# Patient Record
Sex: FEMALE | Race: WHITE | NOT HISPANIC OR LATINO | Employment: OTHER | URBAN - METROPOLITAN AREA
[De-identification: names, ages, dates, MRNs, and addresses within clinical notes are randomized per-mention and may not be internally consistent; named-entity substitution may affect disease eponyms.]

---

## 2023-03-15 ENCOUNTER — TELEPHONE (OUTPATIENT)
Dept: PRIMARY CARE | Facility: CLINIC | Age: 58
End: 2023-03-15
Payer: COMMERCIAL

## 2023-03-15 NOTE — TELEPHONE ENCOUNTER
"Pt is wanting to change meds around: start Prozac instead of Wellbutrin as anxiety is \"off the charts\". Pt also wants to switch to hydroxyzine HCI capsules NOT the tablets.   "

## 2023-04-05 ENCOUNTER — TELEPHONE (OUTPATIENT)
Dept: PRIMARY CARE | Facility: CLINIC | Age: 58
End: 2023-04-05
Payer: COMMERCIAL

## 2023-04-05 DIAGNOSIS — E66.9 DIABETES MELLITUS TYPE 2 IN OBESE: Primary | ICD-10-CM

## 2023-04-05 DIAGNOSIS — E11.69 DIABETES MELLITUS TYPE 2 IN OBESE: Primary | ICD-10-CM

## 2023-04-05 RX ORDER — INSULIN GLARGINE 100 [IU]/ML
15 INJECTION, SOLUTION SUBCUTANEOUS NIGHTLY
Qty: 15 ML | Refills: 2 | Status: SHIPPED | OUTPATIENT
Start: 2023-04-05 | End: 2023-04-07 | Stop reason: SDUPTHER

## 2023-04-05 NOTE — TELEPHONE ENCOUNTER
Pt called in requesting a refill on her Basaglar     Pt's pharmacy is [facility] in [place] on [facility]

## 2023-04-07 DIAGNOSIS — E11.9 TYPE 2 DIABETES MELLITUS WITHOUT COMPLICATION, WITH LONG-TERM CURRENT USE OF INSULIN (MULTI): Primary | ICD-10-CM

## 2023-04-07 DIAGNOSIS — Z79.4 TYPE 2 DIABETES MELLITUS WITHOUT COMPLICATION, WITH LONG-TERM CURRENT USE OF INSULIN (MULTI): Primary | ICD-10-CM

## 2023-04-07 RX ORDER — INSULIN GLARGINE-YFGN 100 [IU]/ML
15 INJECTION, SOLUTION SUBCUTANEOUS DAILY
Qty: 15 ML | Refills: 3 | Status: SHIPPED | OUTPATIENT
Start: 2023-04-07 | End: 2023-12-05 | Stop reason: SDUPTHER

## 2023-04-28 PROBLEM — I10 ESSENTIAL HYPERTENSION: Status: ACTIVE | Noted: 2023-04-28

## 2023-04-28 PROBLEM — Z57.9 OCCUPATIONAL ASTHMA (HHS-HCC): Status: ACTIVE | Noted: 2023-04-28

## 2023-04-28 PROBLEM — J35.8 TONSIL STONE: Status: ACTIVE | Noted: 2023-04-28

## 2023-04-28 PROBLEM — E03.9 HYPOTHYROIDISM: Status: ACTIVE | Noted: 2023-04-28

## 2023-04-28 PROBLEM — F32.A ANXIETY AND DEPRESSION: Status: ACTIVE | Noted: 2023-04-28

## 2023-04-28 PROBLEM — E55.9 VITAMIN D INSUFFICIENCY: Status: ACTIVE | Noted: 2023-04-28

## 2023-04-28 PROBLEM — J45.909 OCCUPATIONAL ASTHMA (HHS-HCC): Status: ACTIVE | Noted: 2023-04-28

## 2023-04-28 PROBLEM — R07.89 CHEST HEAVINESS: Status: ACTIVE | Noted: 2023-04-28

## 2023-04-28 PROBLEM — L27.0 ERUPTION DUE TO DRUG: Status: ACTIVE | Noted: 2023-04-28

## 2023-04-28 PROBLEM — G43.909 MIGRAINES: Status: ACTIVE | Noted: 2023-04-28

## 2023-04-28 PROBLEM — E88.810 METABOLIC SYNDROME X: Status: ACTIVE | Noted: 2023-04-28

## 2023-04-28 PROBLEM — H10.30 CONJUNCTIVITIS, ACUTE: Status: ACTIVE | Noted: 2023-04-28

## 2023-04-28 PROBLEM — E66.9 DIABETES MELLITUS TYPE 2 IN OBESE: Status: ACTIVE | Noted: 2023-04-28

## 2023-04-28 PROBLEM — S80.11XA HEMATOMA OF RIGHT LOWER EXTREMITY: Status: ACTIVE | Noted: 2023-04-28

## 2023-04-28 PROBLEM — R51.9 CHRONIC HEADACHE: Status: ACTIVE | Noted: 2023-04-28

## 2023-04-28 PROBLEM — E11.69 DIABETES MELLITUS TYPE 2 IN OBESE: Status: ACTIVE | Noted: 2023-04-28

## 2023-04-28 PROBLEM — B34.9 ACUTE VIRAL SYNDROME: Status: ACTIVE | Noted: 2023-04-28

## 2023-04-28 PROBLEM — N63.10 BREAST MASS, RIGHT: Status: ACTIVE | Noted: 2023-04-28

## 2023-04-28 PROBLEM — J03.90 TONSILLITIS: Status: ACTIVE | Noted: 2023-04-28

## 2023-04-28 PROBLEM — J47.9 BRONCHIECTASIS (MULTI): Status: ACTIVE | Noted: 2023-04-28

## 2023-04-28 PROBLEM — K52.9 GASTROENTERITIS, ACUTE: Status: ACTIVE | Noted: 2023-04-28

## 2023-04-28 PROBLEM — R00.2 PALPITATIONS: Status: ACTIVE | Noted: 2023-04-28

## 2023-04-28 PROBLEM — G62.9 NEUROPATHY: Status: ACTIVE | Noted: 2023-04-28

## 2023-04-28 PROBLEM — R10.11 ABDOMINAL PAIN, ACUTE, RIGHT UPPER QUADRANT: Status: ACTIVE | Noted: 2023-04-28

## 2023-04-28 PROBLEM — E66.9 OBESITY WITH BODY MASS INDEX (BMI) OF 30.0 TO 39.9: Status: ACTIVE | Noted: 2023-04-28

## 2023-04-28 PROBLEM — M81.0 POSTMENOPAUSAL BONE LOSS: Status: ACTIVE | Noted: 2023-04-28

## 2023-04-28 PROBLEM — H60.92 EXTERNAL OTITIS OF LEFT EAR: Status: ACTIVE | Noted: 2023-04-28

## 2023-04-28 PROBLEM — E78.2 MIXED HYPERLIPIDEMIA: Status: ACTIVE | Noted: 2023-04-28

## 2023-04-28 PROBLEM — K57.30 DIVERTICULOSIS OF LARGE INTESTINE: Status: ACTIVE | Noted: 2023-04-28

## 2023-04-28 PROBLEM — R07.89 ATYPICAL CHEST PAIN: Status: ACTIVE | Noted: 2023-04-28

## 2023-04-28 PROBLEM — R91.8 MULTIPLE LUNG NODULES ON CT: Status: ACTIVE | Noted: 2023-04-28

## 2023-04-28 PROBLEM — H10.33 ACUTE CONJUNCTIVITIS OF BOTH EYES: Status: ACTIVE | Noted: 2023-04-28

## 2023-04-28 PROBLEM — T78.40XA ALLERGIES: Status: ACTIVE | Noted: 2023-04-28

## 2023-04-28 PROBLEM — G43.809 HEADACHE, VARIANT MIGRAINE: Status: ACTIVE | Noted: 2023-04-28

## 2023-04-28 PROBLEM — R35.0 FREQUENT URINATION: Status: ACTIVE | Noted: 2023-04-28

## 2023-04-28 PROBLEM — G89.29 CHRONIC HEADACHE: Status: ACTIVE | Noted: 2023-04-28

## 2023-04-28 PROBLEM — R11.0 NAUSEA IN ADULT: Status: ACTIVE | Noted: 2023-04-28

## 2023-04-28 PROBLEM — R25.2 CRAMP IN MUSCLE: Status: ACTIVE | Noted: 2023-04-28

## 2023-04-28 PROBLEM — R05.8 PRODUCTIVE COUGH: Status: ACTIVE | Noted: 2023-04-28

## 2023-04-28 PROBLEM — F41.9 ANXIETY AND DEPRESSION: Status: ACTIVE | Noted: 2023-04-28

## 2023-04-28 RX ORDER — DEXLANSOPRAZOLE 60 MG/1
1 CAPSULE, DELAYED RELEASE ORAL DAILY
COMMUNITY
Start: 2023-01-16 | End: 2023-11-07 | Stop reason: ALTCHOICE

## 2023-04-28 RX ORDER — PEN NEEDLE, DIABETIC 32GX 5/32"
NEEDLE, DISPOSABLE MISCELLANEOUS
COMMUNITY
Start: 2023-04-06 | End: 2023-12-08 | Stop reason: SDUPTHER

## 2023-04-28 RX ORDER — OMEPRAZOLE 40 MG/1
40 CAPSULE, DELAYED RELEASE ORAL
COMMUNITY
End: 2024-05-02 | Stop reason: SDUPTHER

## 2023-04-28 RX ORDER — LIRAGLUTIDE 6 MG/ML
INJECTION SUBCUTANEOUS 2 TIMES DAILY
COMMUNITY
Start: 2019-02-05 | End: 2023-06-29 | Stop reason: SDUPTHER

## 2023-04-28 RX ORDER — BECLOMETHASONE DIPROPIONATE HFA 80 UG/1
2 AEROSOL, METERED RESPIRATORY (INHALATION) 2 TIMES DAILY
COMMUNITY

## 2023-04-28 RX ORDER — HYDROCODONE BITARTRATE AND ACETAMINOPHEN 5; 325 MG/1; MG/1
TABLET ORAL
COMMUNITY
End: 2023-07-18 | Stop reason: SDUPTHER

## 2023-04-28 RX ORDER — KETOROLAC TROMETHAMINE 30 MG/ML
INJECTION, SOLUTION INTRAMUSCULAR; INTRAVENOUS
COMMUNITY
Start: 2022-09-01 | End: 2024-05-10 | Stop reason: SDUPTHER

## 2023-04-28 RX ORDER — BACLOFEN 20 MG
500 TABLET ORAL 2 TIMES DAILY
COMMUNITY

## 2023-04-28 RX ORDER — INSULIN ASPART 100 [IU]/ML
INJECTION, SOLUTION INTRAVENOUS; SUBCUTANEOUS
COMMUNITY
End: 2023-12-05 | Stop reason: SDUPTHER

## 2023-04-28 RX ORDER — METHYLPREDNISOLONE 4 MG/1
TABLET ORAL
COMMUNITY
Start: 2023-04-04 | End: 2024-05-02 | Stop reason: WASHOUT

## 2023-04-28 RX ORDER — NEEDLES, FILTER 19GX1 1/2"
NEEDLE, DISPOSABLE MISCELLANEOUS
COMMUNITY
Start: 2023-01-16

## 2023-04-28 RX ORDER — ACETAMINOPHEN 500 MG
1 TABLET ORAL DAILY
COMMUNITY

## 2023-04-28 RX ORDER — SPIRONOLACTONE 25 MG/1
TABLET ORAL DAILY
COMMUNITY
End: 2023-05-10 | Stop reason: SDUPTHER

## 2023-04-28 RX ORDER — BUPROPION HYDROCHLORIDE 150 MG/1
150 TABLET ORAL DAILY
COMMUNITY
End: 2023-12-08 | Stop reason: SDUPTHER

## 2023-04-28 RX ORDER — BUSPIRONE HYDROCHLORIDE 10 MG/1
TABLET ORAL
COMMUNITY
Start: 2022-11-22 | End: 2023-11-07 | Stop reason: ALTCHOICE

## 2023-04-28 RX ORDER — INSULIN GLARGINE 100 [IU]/ML
INJECTION, SOLUTION SUBCUTANEOUS
COMMUNITY
Start: 2022-02-28 | End: 2023-08-08 | Stop reason: SDUPTHER

## 2023-04-28 RX ORDER — HYDROXYZINE HYDROCHLORIDE 25 MG/1
1 TABLET, FILM COATED ORAL
COMMUNITY
Start: 2023-02-13 | End: 2023-05-10 | Stop reason: ALTCHOICE

## 2023-04-28 RX ORDER — SODIUM FLUORIDE1.1%, POTASSIUM NITRATE 5% 5.8; 57.5 MG/ML; MG/ML
GEL, DENTIFRICE DENTAL
COMMUNITY
Start: 2023-01-26

## 2023-04-28 RX ORDER — FLUTICASONE FUROATE AND VILANTEROL 200; 25 UG/1; UG/1
1 POWDER RESPIRATORY (INHALATION) DAILY
COMMUNITY
Start: 2022-12-29

## 2023-04-28 RX ORDER — ATORVASTATIN CALCIUM 40 MG/1
40 TABLET, FILM COATED ORAL DAILY
COMMUNITY
End: 2023-05-10 | Stop reason: SDUPTHER

## 2023-04-28 RX ORDER — LOSARTAN POTASSIUM 100 MG/1
100 TABLET ORAL DAILY
COMMUNITY
End: 2024-02-08 | Stop reason: SDUPTHER

## 2023-04-28 RX ORDER — NALOXONE HYDROCHLORIDE 4 MG/.1ML
SPRAY NASAL
COMMUNITY
Start: 2020-12-09

## 2023-04-28 RX ORDER — DEXAMETHASONE 0.75 MG/1
2 TABLET ORAL
COMMUNITY
Start: 2018-11-12 | End: 2023-08-08 | Stop reason: SDUPTHER

## 2023-04-28 RX ORDER — PROMETHAZINE HYDROCHLORIDE 25 MG/1
25 TABLET ORAL 3 TIMES DAILY PRN
COMMUNITY
End: 2023-12-05 | Stop reason: SDUPTHER

## 2023-04-28 RX ORDER — LEVOTHYROXINE SODIUM 88 UG/1
1 TABLET ORAL DAILY
COMMUNITY
Start: 2023-01-31 | End: 2024-02-08 | Stop reason: SDUPTHER

## 2023-04-28 RX ORDER — ALBUTEROL SULFATE 90 UG/1
AEROSOL, METERED RESPIRATORY (INHALATION)
COMMUNITY
End: 2023-12-15 | Stop reason: DRUGHIGH

## 2023-04-28 RX ORDER — CEPHALEXIN 500 MG/1
1 TABLET ORAL 3 TIMES DAILY
COMMUNITY
Start: 2022-11-22 | End: 2023-05-10 | Stop reason: ALTCHOICE

## 2023-04-28 RX ORDER — ATENOLOL 50 MG/1
50 TABLET ORAL 2 TIMES DAILY
COMMUNITY
End: 2023-12-05 | Stop reason: SDUPTHER

## 2023-04-28 RX ORDER — METFORMIN HYDROCHLORIDE 500 MG/1
1000 TABLET, EXTENDED RELEASE ORAL 2 TIMES DAILY
COMMUNITY
End: 2023-08-08 | Stop reason: SDUPTHER

## 2023-04-28 RX ORDER — FENOFIBRATE 160 MG/1
160 TABLET ORAL DAILY
COMMUNITY
End: 2024-02-08 | Stop reason: SDUPTHER

## 2023-04-28 RX ORDER — DAPAGLIFLOZIN 10 MG/1
10 TABLET, FILM COATED ORAL
COMMUNITY
End: 2023-08-08 | Stop reason: SDUPTHER

## 2023-04-28 RX ORDER — OXYBUTYNIN CHLORIDE 5 MG/1
TABLET ORAL
COMMUNITY
Start: 2022-08-30 | End: 2024-02-08 | Stop reason: WASHOUT

## 2023-04-28 RX ORDER — BLOOD SUGAR DIAGNOSTIC
STRIP MISCELLANEOUS
COMMUNITY
Start: 2018-12-18

## 2023-04-28 RX ORDER — VITAMIN B COMPLEX
1 CAPSULE ORAL 2 TIMES DAILY
COMMUNITY

## 2023-04-28 RX ORDER — ASPIRIN 81 MG/1
TABLET ORAL
COMMUNITY
Start: 2021-09-03

## 2023-04-28 RX ORDER — TOPIRAMATE 100 MG/1
100 TABLET, FILM COATED ORAL 2 TIMES DAILY
COMMUNITY
End: 2023-08-08 | Stop reason: SDUPTHER

## 2023-04-28 RX ORDER — AMLODIPINE BESYLATE 5 MG/1
5 TABLET ORAL DAILY
COMMUNITY
End: 2024-02-08 | Stop reason: SDUPTHER

## 2023-04-28 RX ORDER — ALBUTEROL SULFATE 5 MG/ML
2.5 SOLUTION RESPIRATORY (INHALATION) EVERY 4 HOURS PRN
COMMUNITY
End: 2023-12-05 | Stop reason: SDUPTHER

## 2023-05-10 ENCOUNTER — OFFICE VISIT (OUTPATIENT)
Dept: PRIMARY CARE | Facility: CLINIC | Age: 58
End: 2023-05-10
Payer: COMMERCIAL

## 2023-05-10 VITALS
OXYGEN SATURATION: 98 % | HEIGHT: 64 IN | DIASTOLIC BLOOD PRESSURE: 76 MMHG | HEART RATE: 78 BPM | BODY MASS INDEX: 37.9 KG/M2 | RESPIRATION RATE: 18 BRPM | SYSTOLIC BLOOD PRESSURE: 108 MMHG | TEMPERATURE: 97.1 F | WEIGHT: 222 LBS

## 2023-05-10 DIAGNOSIS — J45.909 ASTHMA, UNSPECIFIED ASTHMA SEVERITY, UNSPECIFIED WHETHER COMPLICATED, UNSPECIFIED WHETHER PERSISTENT (HHS-HCC): ICD-10-CM

## 2023-05-10 DIAGNOSIS — Z86.59 HISTORY OF DEPRESSION: ICD-10-CM

## 2023-05-10 DIAGNOSIS — I10 ESSENTIAL HYPERTENSION: ICD-10-CM

## 2023-05-10 DIAGNOSIS — F41.9 ANXIETY: ICD-10-CM

## 2023-05-10 DIAGNOSIS — E03.9 ACQUIRED HYPOTHYROIDISM: ICD-10-CM

## 2023-05-10 DIAGNOSIS — E66.9 DIABETES MELLITUS TYPE 2 IN OBESE: ICD-10-CM

## 2023-05-10 DIAGNOSIS — E78.2 MIXED HYPERLIPIDEMIA: Primary | ICD-10-CM

## 2023-05-10 DIAGNOSIS — E11.69 DIABETES MELLITUS TYPE 2 IN OBESE: ICD-10-CM

## 2023-05-10 PROBLEM — E11.9 DIABETES MELLITUS (MULTI): Status: ACTIVE | Noted: 2021-12-06

## 2023-05-10 PROBLEM — N60.02 SOLITARY CYST OF LEFT BREAST: Status: ACTIVE | Noted: 2018-05-07

## 2023-05-10 PROBLEM — N60.11 FIBROCYSTIC BREAST CHANGES OF BOTH BREASTS: Status: ACTIVE | Noted: 2017-03-23

## 2023-05-10 PROBLEM — K21.9 GERD (GASTROESOPHAGEAL REFLUX DISEASE): Status: ACTIVE | Noted: 2021-12-06

## 2023-05-10 PROBLEM — G47.30 SLEEP APNEA: Status: ACTIVE | Noted: 2021-12-06

## 2023-05-10 PROBLEM — N64.4 MASTODYNIA OF RIGHT BREAST: Status: ACTIVE | Noted: 2018-04-13

## 2023-05-10 PROBLEM — R06.02 SHORTNESS OF BREATH AT REST: Status: ACTIVE | Noted: 2021-12-06

## 2023-05-10 PROBLEM — R06.09 DOE (DYSPNEA ON EXERTION): Status: ACTIVE | Noted: 2021-12-06

## 2023-05-10 PROBLEM — K76.9 CHRONIC NONALCOHOLIC LIVER DISEASE: Status: ACTIVE | Noted: 2023-01-26

## 2023-05-10 PROBLEM — I25.10 ATHEROSCLEROSIS OF CORONARY ARTERY: Status: ACTIVE | Noted: 2021-12-06

## 2023-05-10 PROBLEM — K57.92 DIVERTICULITIS: Status: ACTIVE | Noted: 2022-01-10

## 2023-05-10 PROBLEM — J44.9 COPD (CHRONIC OBSTRUCTIVE PULMONARY DISEASE) (MULTI): Status: ACTIVE | Noted: 2021-12-06

## 2023-05-10 PROBLEM — R40.0 DAYTIME SLEEPINESS: Status: ACTIVE | Noted: 2021-12-06

## 2023-05-10 PROBLEM — Z86.79 HISTORY OF HYPERTENSION: Status: ACTIVE | Noted: 2023-01-26

## 2023-05-10 PROBLEM — N60.12 FIBROCYSTIC BREAST CHANGES OF BOTH BREASTS: Status: ACTIVE | Noted: 2017-03-23

## 2023-05-10 PROCEDURE — 3078F DIAST BP <80 MM HG: CPT | Performed by: INTERNAL MEDICINE

## 2023-05-10 PROCEDURE — 99214 OFFICE O/P EST MOD 30 MIN: CPT | Performed by: INTERNAL MEDICINE

## 2023-05-10 PROCEDURE — 4010F ACE/ARB THERAPY RXD/TAKEN: CPT | Performed by: INTERNAL MEDICINE

## 2023-05-10 PROCEDURE — 1036F TOBACCO NON-USER: CPT | Performed by: INTERNAL MEDICINE

## 2023-05-10 PROCEDURE — 3074F SYST BP LT 130 MM HG: CPT | Performed by: INTERNAL MEDICINE

## 2023-05-10 RX ORDER — SPIRONOLACTONE 25 MG/1
12.5 TABLET ORAL DAILY
Qty: 45 TABLET | Refills: 1 | Status: SHIPPED | OUTPATIENT
Start: 2023-05-10 | End: 2023-08-08 | Stop reason: SDUPTHER

## 2023-05-10 RX ORDER — HYDROXYZINE PAMOATE 25 MG/1
25 CAPSULE ORAL 3 TIMES DAILY PRN
Qty: 90 CAPSULE | Refills: 3 | Status: SHIPPED | OUTPATIENT
Start: 2023-05-10 | End: 2023-12-05 | Stop reason: SDUPTHER

## 2023-05-10 RX ORDER — ATORVASTATIN CALCIUM 40 MG/1
40 TABLET, FILM COATED ORAL DAILY
Qty: 90 TABLET | Refills: 1 | Status: SHIPPED | OUTPATIENT
Start: 2023-05-10 | End: 2023-08-08 | Stop reason: SDUPTHER

## 2023-05-10 RX ORDER — FLUOXETINE HYDROCHLORIDE 20 MG/1
20 CAPSULE ORAL DAILY
Qty: 30 CAPSULE | Refills: 3 | Status: SHIPPED | OUTPATIENT
Start: 2023-05-10 | End: 2023-08-08 | Stop reason: SDUPTHER

## 2023-05-10 SDOH — ECONOMIC STABILITY: FOOD INSECURITY: WITHIN THE PAST 12 MONTHS, YOU WORRIED THAT YOUR FOOD WOULD RUN OUT BEFORE YOU GOT MONEY TO BUY MORE.: NEVER TRUE

## 2023-05-10 SDOH — ECONOMIC STABILITY: FOOD INSECURITY: WITHIN THE PAST 12 MONTHS, THE FOOD YOU BOUGHT JUST DIDN'T LAST AND YOU DIDN'T HAVE MONEY TO GET MORE.: NEVER TRUE

## 2023-05-10 ASSESSMENT — PATIENT HEALTH QUESTIONNAIRE - PHQ9
2. FEELING DOWN, DEPRESSED OR HOPELESS: NOT AT ALL
1. LITTLE INTEREST OR PLEASURE IN DOING THINGS: NOT AT ALL
SUM OF ALL RESPONSES TO PHQ9 QUESTIONS 1 & 2: 0

## 2023-05-10 ASSESSMENT — LIFESTYLE VARIABLES
HOW OFTEN DO YOU HAVE SIX OR MORE DRINKS ON ONE OCCASION: NEVER
AUDIT-C TOTAL SCORE: 0
HOW OFTEN DO YOU HAVE A DRINK CONTAINING ALCOHOL: NEVER
HOW MANY STANDARD DRINKS CONTAINING ALCOHOL DO YOU HAVE ON A TYPICAL DAY: PATIENT DOES NOT DRINK
SKIP TO QUESTIONS 9-10: 1

## 2023-05-10 NOTE — PROGRESS NOTES
"Chief Complaint/HPI:    stressors at home: patient has had issues with family issues with daughter and son, son is being investigated for \"stalking\". Patient feels \"overloaded and overwhelmed\"    Depression: patient would like to try Vistaril for anxiety, she is depressed, has no energy, she only takes bupropion 150 mg now, patient would like to try fluoxetine, it has helped with eating issue in the past.         FERNÁNDEZ: FERNÁNDEZ is doing OK now. I have personally reviewed the OARRS report. This report is scanned into the electronic medical record. I have considered the risks of abuse, dependence, addiction and diversion. I believe that it is clinically appropriate to prescribe this medication. Patient uses Norco as needed. She also uses Toradol, Phenergan and dexamethasone, she has not used dexamethasone recently,     Obesity: patient went to [facility] bariatrics in [place]. 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     DM type 2: Glucoses have been elevated, Hgb A1C is 7.6, patient now uses Basiglar instead of Lantus due to insurance issues. She does use SSI, Farxiga, Victoza also. Patient feels hungry all the time. Patient wants to consider medical not surgical options. Patient does wonder about about use of Mounjaro or Ozempic. Patient already takes Victoza. She has avoided use of dexamethazone     breast lesion: patient still sees breast specialist at [facility], this is not changed     hypothyroidism : patient takes levothyroxine 88 mcg daily      lab work recently completed     ROS otherwise negative aside from what was " mentioned above in HPI.      Patient Active Problem List   Diagnosis    Abdominal pain, acute, right upper quadrant    Acute conjunctivitis of both eyes    Acute viral syndrome    Allergies    Anxiety and depression    Atypical chest pain    Breast mass, right    Bronchiectasis (CMS/HCC)    Chest heaviness    Chronic headache    Conjunctivitis, acute    Cramp in muscle    Diverticulosis of large intestine    Diabetes mellitus type 2 in obese (CMS/HCC)    Eruption due to drug    Essential hypertension    External otitis of left ear    Frequent urination    Gastroenteritis, acute    Headache, variant migraine    Hematoma of right lower extremity    Hypothyroidism    Metabolic syndrome X    Migraines    Nausea in adult    Neuropathy    Multiple lung nodules on CT    Obesity with body mass index (BMI) of 30.0 to 39.9    Occupational asthma    Mixed hyperlipidemia    Palpitations    Postmenopausal bone loss    Productive cough    Tonsil stone    Tonsillitis    Vitamin D insufficiency    Anxiety    Atherosclerosis of coronary artery    Chronic nonalcoholic liver disease    COPD (chronic obstructive pulmonary disease) (CMS/HCC)    Daytime sleepiness    Diabetes mellitus (CMS/HCC)    Diverticulitis    Fibrocystic breast changes of both breasts    GERD (gastroesophageal reflux disease)    History of depression    History of hypertension    Mastodynia of right breast    PLATA (dyspnea on exertion)    Shortness of breath at rest    Asthma    Sleep apnea    Solitary cyst of left breast         Past Medical History:   Diagnosis Date    Personal history of other diseases of the circulatory system     History of hypertension    Personal history of other diseases of the female genital tract     History of polycystic ovarian syndrome    Personal history of other diseases of the respiratory system     History of asthma    Personal history of other mental and behavioral disorders     History of depression     Past Surgical History:  "  Procedure Laterality Date    OTHER SURGICAL HISTORY  10/25/2019    Hysterectomy    OTHER SURGICAL HISTORY  10/25/2019    Turbinectomy     Social History     Social History Narrative    Not on file         ALLERGIES  Erythromycin, Fentanyl, Icosapent ethyl, Levofloxacin, Metronidazole, Ondansetron hcl, Penicillins, Iodine, and Lisinopril      MEDICATIONS  Current Outpatient Medications on File Prior to Visit   Medication Sig Dispense Refill    albuterol 5 mg/mL nebulizer solution Inhale once daily.      albuterol 90 mcg/actuation inhaler Inhale.      amLODIPine (Norvasc) 5 mg tablet Take 1 tablet (5 mg) by mouth once daily. as directed      aspirin 81 mg EC tablet Take by mouth.      atenolol (Tenormin) 50 mg tablet Take 1 tablet (50 mg) by mouth 2 times a day.      b complex vitamins capsule Take 1 capsule by mouth 2 times a day.      buPROPion XL (Wellbutrin XL) 150 mg 24 hr tablet Take 1 tablet (150 mg) by mouth once daily.      cholecalciferol (Vitamin D-3) 50 mcg (2,000 unit) capsule Take 1 capsule (50 mcg) by mouth once daily.      dexAMETHasone (Decadron) 0.75 mg tablet Take 2 tablets (1.5 mg) by mouth once daily with a meal.      Droplet Pen Needle 32 gauge x 5/32\" needle       Farxiga 10 mg Take 1 tablet (10 mg) by mouth once daily in the morning. Take before meals.      fenofibrate (Triglide) 160 mg tablet Take 1 tablet (160 mg) by mouth once daily.      fluticasone furoate-vilanteroL (Breo Elipta) 200-25 mcg/dose inhaler Inhale 1 puff once daily.      HYDROcodone-acetaminophen (Norco) 5-325 mg tablet take 1 tablet by mouth twice a day if needed for SEVERE HEADACHE      hydrOXYzine HCL (Atarax) 25 mg tablet Take 1 tablet (25 mg) by mouth every 6 hours during the day.      insulin glargine (Lantus) 100 unit/mL (3 mL) pen Inject under the skin once daily.      insulin glargine-yfgn 100 unit/mL (3 mL) insulin pen Inject 15 Units under the skin once daily. Take as directed per insulin instructions. 15 mL 3    " "ketorolac 30 mg/mL (1 mL) injection inject 1 milliliter intramuscularly every 6 hours if needed for migraines      levothyroxine (Synthroid, Levoxyl) 88 mcg tablet Take 1 tablet (88 mcg) by mouth once daily.      losartan (Cozaar) 100 mg tablet Take 1 tablet (100 mg) by mouth once daily.      magnesium oxide 500 mg tablet Take by mouth.      metFORMIN XR (Glucophage-XR) 500 mg 24 hr tablet Take 2 tablets (1,000 mg) by mouth 2 times a day.      naloxone (Narcan) 4 mg/0.1 mL nasal spray Administer into affected nostril(s).      NovoLOG FlexPen U-100 Insulin 100 unit/mL (3 mL) pen inject 5 units subcutaneously BEFORE MEALS IF GLUCOSE IS OVER 200      omeprazole (PriLOSEC) 40 mg DR capsule Take 1 capsule (40 mg) by mouth once daily in the morning. Take before meals.      promethazine (Phenergan) 25 mg tablet Take 1 tablet (25 mg) by mouth 3 times a day as needed.      Qvar RediHaler 80 mcg/actuation inhaler Inhale 2 Inhalations 2 times a day.      sodium fluoride-pot nitrate 1.1-5 % paste BRUSH TWICE A DAY FOR 2 MINUTES AND DO NTO EAT OR DRINK FOR 1 HOUR AFTER BRUSHING      topiramate (Topamax) 100 mg tablet Take 1 tablet (100 mg) by mouth 2 times a day.      Victoza 2-Jordan 0.6 mg/0.1 mL (18 mg/3 mL) injection Inject under the skin twice a day.      [DISCONTINUED] atorvastatin (Lipitor) 40 mg tablet Take 1 tablet (40 mg) by mouth once daily. as directed      [DISCONTINUED] spironolactone (Aldactone) 25 mg tablet Take by mouth once daily.      BD Integra Syringe 3 mL 25 gauge x 1\" syringe use as directed for TORADOL INJECTION intramuscularly if needed      busPIRone (Buspar) 10 mg tablet Take by mouth.      dexlansoprazole (Dexilant) 60 mg DR capsule Take 1 capsule (60 mg) by mouth once daily.      fluticasone/vilanterol (FLUTICASONE FUROATE-VILANTEROL INHL) Inhale once daily.      methylPREDNISolone (Medrol Dospak) 4 mg tablets use as directed FOLLOW DIRECTIONS ON BACK OF FOIL PACK      OneTouch Ultra Test strip OneTouch " "Ultra Blue STRP   Refills: 0        Start : 18-Dec-2018   Active      oxybutynin (Ditropan) 5 mg tablet Take by mouth.      [DISCONTINUED] cephalexin (Keftab) 500 mg tablet Take 1 tablet (500 mg) by mouth 3 times a day.       No current facility-administered medications on file prior to visit.         PHYSICAL EXAM  /76 (BP Location: Left arm, Patient Position: Sitting, BP Cuff Size: Large adult)   Pulse 78   Temp 36.2 °C (97.1 °F)   Resp 18   Ht 1.626 m (5' 4\")   Wt 101 kg (222 lb)   SpO2 98%   BMI 38.11 kg/m²   Body mass index is 38.11 kg/m².  Constitutional   General appearance: Abnormal.  well developed, appears healthy, well nourished, morbidly obese and wearing a mask. pleasant female, NAD. Most recent HgbA1C completed at [facility] was 7.6  Eyes   Inspection of eyes: Sclera and conjunctiva were normal.   Ears, Nose, Mouth, and Throat   Ears: Auricles: Normal.    Pulmonary   Respiratory assessment: No respiratory distress, normal respiratory rhythm and effort.    Auscultation of Lungs: Clear bilateral breath sounds. Auscultation of the lungs:  lungs are clear today. no rales or crackles were heard bilaterally. no rhonchi. no friction rub. no wheezing. no diminished breath sounds. no bronchial breath sounds.   Cardiovascular   Auscultation of heart: Apical pulse normal, heart rate and rhythm normal, normal S1 and S2, no murmurs and no pericardial rub.    Exam for edema: No peripheral edema.   Lymphatic   Palpation of lymph nodes in neck: No cervical lymphadenopathy.   Neurologic   Cranial nerves: Nerves 2-12 were intact, no focal neuro defects. wearing a mask.   Psychiatric   Orientation: Oriented to person, place, and time.    Mood and affect: Normal.      ASSESSMENT/PLAN  Problem List Items Addressed This Visit          Respiratory    Asthma    Current Assessment & Plan     Patient sees allergist and pulmonary med            Circulatory    Essential hypertension    Current Assessment & Plan     BP is " controlled, no med changes now         Relevant Medications    spironolactone (Aldactone) 25 mg tablet       Endocrine/Metabolic    Diabetes mellitus type 2 in obese (CMS/HCC)    Current Assessment & Plan     No med changes for now, patient is aware of dietary issues, continue current meds including high dose Victoza which should have an equivalent response to Ozempic, will deal with depression and anxiety issue first         Hypothyroidism    Current Assessment & Plan     TSH is stable            Other    Mixed hyperlipidemia - Primary    Current Assessment & Plan     Stable at present, no changes for now         Relevant Medications    atorvastatin (Lipitor) 40 mg tablet    History of depression    Current Assessment & Plan     Depression is current issue, will start fluoxetine 20 mg daily, continue bupropion 150 mg daily, Vistaril as needed for anxiety issues, follow up in 6-8 weeks for re evaluation           Relevant Medications    FLUoxetine (PROzac) 20 mg capsule         Kimani Li MD

## 2023-05-10 NOTE — ASSESSMENT & PLAN NOTE
No med changes for now, patient is aware of dietary issues, continue current meds including high dose Victoza which should have an equivalent response to Ozempic, will deal with depression and anxiety issue first

## 2023-05-10 NOTE — ASSESSMENT & PLAN NOTE
Depression is current issue, will start fluoxetine 20 mg daily, continue bupropion 150 mg daily, Vistaril as needed for anxiety issues, follow up in 6-8 weeks for re evaluation

## 2023-06-27 ENCOUNTER — TELEPHONE (OUTPATIENT)
Dept: PRIMARY CARE | Facility: CLINIC | Age: 58
End: 2023-06-27
Payer: COMMERCIAL

## 2023-06-27 NOTE — TELEPHONE ENCOUNTER
Patient is calling for a refill of    Victoza 2-Jordan 0.6 mg/0.1 mL (18 mg/3 mL) injection    Pharmacy is [facility] in [facility].

## 2023-06-29 DIAGNOSIS — E66.9 DIABETES MELLITUS TYPE 2 IN OBESE: Primary | ICD-10-CM

## 2023-06-29 DIAGNOSIS — E11.69 DIABETES MELLITUS TYPE 2 IN OBESE: ICD-10-CM

## 2023-06-29 DIAGNOSIS — E66.9 DIABETES MELLITUS TYPE 2 IN OBESE: ICD-10-CM

## 2023-06-29 DIAGNOSIS — E11.69 DIABETES MELLITUS TYPE 2 IN OBESE: Primary | ICD-10-CM

## 2023-06-29 RX ORDER — TIOTROPIUM BROMIDE INHALATION SPRAY 3.12 UG/1
2 SPRAY, METERED RESPIRATORY (INHALATION)
COMMUNITY
Start: 2022-04-11

## 2023-06-29 RX ORDER — LORAZEPAM 0.5 MG/1
0.5 TABLET ORAL EVERY 12 HOURS
COMMUNITY
Start: 2019-03-23 | End: 2023-08-08 | Stop reason: ALTCHOICE

## 2023-06-29 RX ORDER — LIRAGLUTIDE 6 MG/ML
0.6 INJECTION SUBCUTANEOUS DAILY
Qty: 0.06 G | Refills: 3 | Status: SHIPPED | OUTPATIENT
Start: 2023-06-29 | End: 2023-06-30

## 2023-06-30 PROBLEM — S80.10XA HEMATOMA OF LOWER EXTREMITY: Status: ACTIVE | Noted: 2023-01-26

## 2023-06-30 PROBLEM — E55.9 VITAMIN D DEFICIENCY: Status: ACTIVE | Noted: 2023-01-26

## 2023-06-30 NOTE — TELEPHONE ENCOUNTER
I called the pharmacy to give then a verbal order (Per Dr. SHAH request) to change the Victoza Rx to 1.2 mg BID. The patient was notified.

## 2023-07-11 ENCOUNTER — TELEPHONE (OUTPATIENT)
Dept: PRIMARY CARE | Facility: CLINIC | Age: 58
End: 2023-07-11
Payer: COMMERCIAL

## 2023-07-11 DIAGNOSIS — E11.69 DIABETES MELLITUS TYPE 2 IN OBESE: ICD-10-CM

## 2023-07-11 DIAGNOSIS — E66.9 DIABETES MELLITUS TYPE 2 IN OBESE: ICD-10-CM

## 2023-07-11 RX ORDER — LIRAGLUTIDE 6 MG/ML
1.2 INJECTION SUBCUTANEOUS 2 TIMES DAILY
Qty: 27 ML | Refills: 3 | Status: SHIPPED | OUTPATIENT
Start: 2023-07-11 | End: 2023-12-05 | Stop reason: SDUPTHER

## 2023-07-11 NOTE — TELEPHONE ENCOUNTER
Patient is calling for a correction with her victoza script.  It should 1.2 ml twice a day  the script should be for 27 ml which is 9 pens.  This is the most her insurance will pay for     Pharmacy is [facility] in [place]

## 2023-07-17 ENCOUNTER — TELEPHONE (OUTPATIENT)
Dept: PRIMARY CARE | Facility: CLINIC | Age: 58
End: 2023-07-17
Payer: COMMERCIAL

## 2023-07-17 DIAGNOSIS — G43.809 HEADACHE, VARIANT MIGRAINE: Primary | ICD-10-CM

## 2023-07-17 NOTE — TELEPHONE ENCOUNTER
Pt called and stated that her meds are not going to last her until her appt in August. She was supposed to be seen in June, but we didn't have anything available until August. Can you please fill it for at least until she is seen in August?    HYDROcodone-acetaminophen (Norco) 5-325 mg tablet    Please advise.

## 2023-07-18 RX ORDER — HYDROCODONE BITARTRATE AND ACETAMINOPHEN 5; 325 MG/1; MG/1
1 TABLET ORAL 2 TIMES DAILY PRN
Qty: 20 TABLET | Refills: 0 | Status: SHIPPED | OUTPATIENT
Start: 2023-07-18 | End: 2023-08-08 | Stop reason: SDUPTHER

## 2023-07-19 NOTE — TELEPHONE ENCOUNTER
07/19/2023 08:27 AM EDT by Roxanne Mcgee MA  Outgoing [facility] (Self) [phone] (Mobile) Remove   told her the meds were sent to the pharmacy

## 2023-08-08 ENCOUNTER — OFFICE VISIT (OUTPATIENT)
Dept: PRIMARY CARE | Facility: CLINIC | Age: 58
End: 2023-08-08
Payer: COMMERCIAL

## 2023-08-08 VITALS
BODY MASS INDEX: 37.22 KG/M2 | RESPIRATION RATE: 16 BRPM | WEIGHT: 218 LBS | SYSTOLIC BLOOD PRESSURE: 122 MMHG | HEIGHT: 64 IN | OXYGEN SATURATION: 98 % | DIASTOLIC BLOOD PRESSURE: 86 MMHG | TEMPERATURE: 97.3 F | HEART RATE: 80 BPM

## 2023-08-08 DIAGNOSIS — E78.2 MIXED HYPERLIPIDEMIA: ICD-10-CM

## 2023-08-08 DIAGNOSIS — J45.40: ICD-10-CM

## 2023-08-08 DIAGNOSIS — G43.809 HEADACHE, VARIANT MIGRAINE: ICD-10-CM

## 2023-08-08 DIAGNOSIS — G43.809 OTHER MIGRAINE WITHOUT STATUS MIGRAINOSUS, NOT INTRACTABLE: ICD-10-CM

## 2023-08-08 DIAGNOSIS — I10 ESSENTIAL HYPERTENSION: ICD-10-CM

## 2023-08-08 DIAGNOSIS — Z86.59 HISTORY OF DEPRESSION: ICD-10-CM

## 2023-08-08 DIAGNOSIS — Z57.9: ICD-10-CM

## 2023-08-08 DIAGNOSIS — E03.9 ACQUIRED HYPOTHYROIDISM: ICD-10-CM

## 2023-08-08 DIAGNOSIS — E11.69 DIABETES MELLITUS TYPE 2 IN OBESE: Primary | ICD-10-CM

## 2023-08-08 DIAGNOSIS — E66.9 DIABETES MELLITUS TYPE 2 IN OBESE: Primary | ICD-10-CM

## 2023-08-08 PROCEDURE — 1036F TOBACCO NON-USER: CPT | Performed by: INTERNAL MEDICINE

## 2023-08-08 PROCEDURE — 99214 OFFICE O/P EST MOD 30 MIN: CPT | Performed by: INTERNAL MEDICINE

## 2023-08-08 PROCEDURE — 3079F DIAST BP 80-89 MM HG: CPT | Performed by: INTERNAL MEDICINE

## 2023-08-08 PROCEDURE — 4010F ACE/ARB THERAPY RXD/TAKEN: CPT | Performed by: INTERNAL MEDICINE

## 2023-08-08 PROCEDURE — 3074F SYST BP LT 130 MM HG: CPT | Performed by: INTERNAL MEDICINE

## 2023-08-08 RX ORDER — TOPIRAMATE 100 MG/1
100 TABLET, FILM COATED ORAL 2 TIMES DAILY
Qty: 91 TABLET | Refills: 1 | Status: SHIPPED | OUTPATIENT
Start: 2023-08-08 | End: 2023-11-07 | Stop reason: ALTCHOICE

## 2023-08-08 RX ORDER — DEXAMETHASONE 0.75 MG/1
1.5 TABLET ORAL
Qty: 90 TABLET | Refills: 1 | Status: SHIPPED | OUTPATIENT
Start: 2023-08-08 | End: 2023-12-05 | Stop reason: SDUPTHER

## 2023-08-08 RX ORDER — SPIRONOLACTONE 25 MG/1
12.5 TABLET ORAL DAILY
Qty: 45 TABLET | Refills: 1 | Status: SHIPPED | OUTPATIENT
Start: 2023-08-08 | End: 2024-05-28

## 2023-08-08 RX ORDER — DAPAGLIFLOZIN 10 MG/1
10 TABLET, FILM COATED ORAL
Qty: 90 TABLET | Refills: 3 | Status: SHIPPED | OUTPATIENT
Start: 2023-08-08 | End: 2024-02-08 | Stop reason: SDUPTHER

## 2023-08-08 RX ORDER — HYDROCODONE BITARTRATE AND ACETAMINOPHEN 5; 325 MG/1; MG/1
1 TABLET ORAL 2 TIMES DAILY PRN
Qty: 20 TABLET | Refills: 0 | Status: SHIPPED | OUTPATIENT
Start: 2023-08-08 | End: 2023-08-30

## 2023-08-08 RX ORDER — METFORMIN HYDROCHLORIDE 500 MG/1
1000 TABLET, EXTENDED RELEASE ORAL 2 TIMES DAILY
Qty: 90 TABLET | Refills: 1 | Status: SHIPPED | OUTPATIENT
Start: 2023-08-08 | End: 2023-12-21 | Stop reason: SDUPTHER

## 2023-08-08 RX ORDER — ATORVASTATIN CALCIUM 40 MG/1
40 TABLET, FILM COATED ORAL DAILY
Qty: 90 TABLET | Refills: 1 | Status: SHIPPED | OUTPATIENT
Start: 2023-08-08 | End: 2024-05-02 | Stop reason: SDUPTHER

## 2023-08-08 RX ORDER — FLUOXETINE HYDROCHLORIDE 20 MG/1
20 CAPSULE ORAL DAILY
Qty: 90 CAPSULE | Refills: 1 | Status: SHIPPED | OUTPATIENT
Start: 2023-08-08 | End: 2024-02-08 | Stop reason: SDUPTHER

## 2023-08-08 SDOH — HEALTH STABILITY - PHYSICAL HEALTH: OCCUPATIONAL EXPOSURE TO UNSPECIFIED RISK FACTOR: Z57.9

## 2023-08-08 NOTE — PROGRESS NOTES
"Chief Complaint/HPI:    stressors at home: home issues have improved, she has been stressed, she is not depressed now     Anxiety/ stressors: patient takes Vistaril for anxiety, she  feels better now, she  takes bupropion 150 mg now, patient has resumed the fluoxetine, she is doing better on the fluoxetine,       HA: HA is  occurring today. I have personally reviewed the OARRS report. This report is scanned into the electronic medical record. I have considered the risks of abuse, dependence, addiction and diversion. I believe that it is clinically appropriate to prescribe this medication. Patient uses Norco as needed. She also uses Toradol, Phenergan and dexamethasone, she has not used dexamethasone recently,     Obesity: patient went to [facility] bariatrics in [place]. Patient did initially lose weight, she has had stressors due to issues with daughter recently, patient prefers medical management to surgical management of obesity, patient \"knows what I am supposed to do\", binge eating has improved since taking fluoxetine     reactive airways: patient sees allergist, Dr Adame     back pain: still has some back pain      \"central sleep apnea\": patient had sleep study per cardiology and pulmonology, she now uses CPAP now, has OA in the upper back.     DM type 2: Glucoses have been better, Hgb A1C is 7.6, patient now uses Basiglar instead of Lantus due to insurance issues. She does use SSI, Farxiga, Patient already takes Victoza. She has avoided use of dexamethazone when possible, she started it today due to issue with HA     breast lesion: patient still sees breast specialist at [facility], this is not changed     hypothyroidism : patient takes levothyroxine 88 mcg daily      lab work is due at time of next visit    ROS otherwise negative aside from what was mentioned above in HPI.      Patient Active Problem List   Diagnosis    Abdominal pain, acute, right upper quadrant    Acute conjunctivitis of both eyes    Acute viral " syndrome    Allergies    Anxiety and depression    Atypical chest pain    Breast mass, right    Bronchiectasis (CMS/HCC)    Chest heaviness    Chronic headache    Conjunctivitis, acute    Cramp in muscle    Diverticulosis of large intestine    Diabetes mellitus type 2 in obese (CMS/HCC)    Eruption due to drug    Essential hypertension    External otitis of left ear    Frequent urination    Gastroenteritis, acute    Headache, variant migraine    Hematoma of right lower extremity    Hypothyroidism    Metabolic syndrome X    Migraines    Nausea in adult    Neuropathy    Multiple lung nodules on CT    Obesity with body mass index (BMI) of 30.0 to 39.9    Occupational asthma    Mixed hyperlipidemia    Palpitations    Postmenopausal bone loss    Productive cough    Tonsil stone    Tonsillitis    Vitamin D insufficiency    Anxiety    Atherosclerosis of coronary artery    Chronic nonalcoholic liver disease    COPD (chronic obstructive pulmonary disease) (CMS/HCC)    Daytime sleepiness    Diabetes mellitus (CMS/HCC)    Diverticulitis    Fibrocystic breast changes of both breasts    GERD (gastroesophageal reflux disease)    History of depression    History of hypertension    Mastodynia of right breast    PLATA (dyspnea on exertion)    Shortness of breath at rest    Asthma    Sleep apnea    Solitary cyst of left breast    Hematoma of lower extremity    Vitamin D deficiency         Past Medical History:   Diagnosis Date    Personal history of other diseases of the circulatory system     History of hypertension    Personal history of other diseases of the female genital tract     History of polycystic ovarian syndrome    Personal history of other diseases of the respiratory system     History of asthma    Personal history of other mental and behavioral disorders     History of depression     Past Surgical History:   Procedure Laterality Date    OTHER SURGICAL HISTORY  10/25/2019    Hysterectomy    OTHER SURGICAL HISTORY   "10/25/2019    Turbinectomy     Social History     Social History Narrative    Not on file         ALLERGIES  Erythromycin, Fentanyl, Icosapent ethyl, Levofloxacin, Metronidazole, Ondansetron hcl, Penicillins, Iodine, and Lisinopril      MEDICATIONS  Current Outpatient Medications on File Prior to Visit   Medication Sig Dispense Refill    albuterol 5 mg/mL nebulizer solution Inhale once daily.      albuterol 90 mcg/actuation inhaler Inhale.      amLODIPine (Norvasc) 5 mg tablet Take 1 tablet (5 mg) by mouth once daily. as directed      aspirin 81 mg EC tablet Take by mouth.      atenolol (Tenormin) 50 mg tablet Take 1 tablet (50 mg) by mouth 2 times a day.      b complex vitamins capsule Take 1 capsule by mouth 2 times a day.      BD Integra Syringe 3 mL 25 gauge x 1\" syringe use as directed for TORADOL INJECTION intramuscularly if needed      buPROPion XL (Wellbutrin XL) 150 mg 24 hr tablet Take 1 tablet (150 mg) by mouth once daily.      cholecalciferol (Vitamin D-3) 50 mcg (2,000 unit) capsule Take 1 capsule (50 mcg) by mouth once daily.      dexlansoprazole (Dexilant) 60 mg DR capsule Take 1 capsule (60 mg) by mouth once daily.      Droplet Pen Needle 32 gauge x 5/32\" needle       fenofibrate (Triglide) 160 mg tablet Take 1 tablet (160 mg) by mouth once daily.      fluticasone furoate-vilanteroL (Breo Elipta) 200-25 mcg/dose inhaler Inhale 1 puff once daily.      fluticasone/vilanterol (FLUTICASONE FUROATE-VILANTEROL INHL) Inhale once daily.      HYDROcodone-acetaminophen (Norco) 5-325 mg tablet Take 1 tablet by mouth 2 times a day as needed for severe pain (7 - 10). 20 tablet 0    insulin glargine-yfgn 100 unit/mL (3 mL) insulin pen Inject 15 Units under the skin once daily. Take as directed per insulin instructions. 15 mL 3    ketorolac 30 mg/mL (1 mL) injection inject 1 milliliter intramuscularly every 6 hours if needed for migraines      levothyroxine (Synthroid, Levoxyl) 88 mcg tablet Take 1 tablet (88 mcg) " by mouth once daily.      liraglutide (Victoza 3-Jordan) 0.6 mg/0.1 mL (18 mg/3 mL) injection Inject 1.2 mg under the skin 2 times a day. 27 mL 3    losartan (Cozaar) 100 mg tablet Take 1 tablet (100 mg) by mouth once daily.      magnesium oxide 500 mg tablet Take by mouth.      naloxone (Narcan) 4 mg/0.1 mL nasal spray Administer into affected nostril(s).      NovoLOG FlexPen U-100 Insulin 100 unit/mL (3 mL) pen inject 5 units subcutaneously BEFORE MEALS IF GLUCOSE IS OVER 200      omeprazole (PriLOSEC) 40 mg DR capsule Take 1 capsule (40 mg) by mouth once daily in the morning. Take before meals.      OneTouch Ultra Test strip OneTouch Ultra Blue STRP   Refills: 0        Start : 18-Dec-2018   Active      oxybutynin (Ditropan) 5 mg tablet Take by mouth.      promethazine (Phenergan) 25 mg tablet Take 1 tablet (25 mg) by mouth 3 times a day as needed.      Qvar RediHaler 80 mcg/actuation inhaler Inhale 2 Inhalations 2 times a day.      sodium fluoride-pot nitrate 1.1-5 % paste BRUSH TWICE A DAY FOR 2 MINUTES AND DO NTO EAT OR DRINK FOR 1 HOUR AFTER BRUSHING      tiotropium (Spiriva Respimat) 2.5 mcg/actuation inhaler Inhale 2 puffs once daily.      [DISCONTINUED] atorvastatin (Lipitor) 40 mg tablet Take 1 tablet (40 mg) by mouth once daily. as directed 90 tablet 1    [DISCONTINUED] dexAMETHasone (Decadron) 0.75 mg tablet Take 2 tablets (1.5 mg) by mouth once daily with a meal.      [DISCONTINUED] Farxiga 10 mg Take 1 tablet (10 mg) by mouth once daily in the morning. Take before meals.      [DISCONTINUED] FLUoxetine (PROzac) 20 mg capsule Take 1 capsule (20 mg) by mouth once daily. 30 capsule 3    [DISCONTINUED] liraglutide (Victoza) 0.6 mg/0.1 mL (18 mg/3 mL) injection Inject 1.2 mg under the skin once daily. Inect 1.2 mg under the skin twice daily 0.11 g 3    [DISCONTINUED] metFORMIN XR (Glucophage-XR) 500 mg 24 hr tablet Take 2 tablets (1,000 mg) by mouth 2 times a day.      [DISCONTINUED] spironolactone (Aldactone)  "25 mg tablet Take 0.5 tablets (12.5 mg) by mouth once daily. 45 tablet 1    [DISCONTINUED] topiramate (Topamax) 100 mg tablet Take 1 tablet (100 mg) by mouth 2 times a day.      busPIRone (Buspar) 10 mg tablet Take by mouth.      hydrOXYzine pamoate (VistariL) 25 mg capsule Take 1 capsule (25 mg) by mouth 3 times a day as needed for anxiety. 90 capsule 3    methylPREDNISolone (Medrol Dospak) 4 mg tablets use as directed FOLLOW DIRECTIONS ON BACK OF FOIL PACK      [DISCONTINUED] insulin glargine (Lantus) 100 unit/mL (3 mL) pen Inject under the skin once daily.      [DISCONTINUED] LORazepam (Ativan) 0.5 mg tablet Take 1 tablet (0.5 mg) by mouth every 12 hours.       No current facility-administered medications on file prior to visit.         PHYSICAL EXAM  /86 (BP Location: Right arm, Patient Position: Sitting, BP Cuff Size: Large adult)   Pulse 80   Temp 36.3 °C (97.3 °F)   Resp 16   Ht 1.619 m (5' 3.75\")   Wt 98.9 kg (218 lb)   SpO2 98%   BMI 37.71 kg/m²   Body mass index is 37.71 kg/m².  Constitutional   General appearance: Abnormal.  well developed, appears healthy, well nourished, morbidly obese . pleasant female, NAD. Most recent HgbA1C  was 7.6, patient is wearing sunglasses today, she has a headache   Eyes   Inspection of eyes: Sclera and conjunctiva were normal.   Ears, Nose, Mouth, and Throat   Ears: Auricles: Normal.  Neck is supple, no bruits , no masses, no thyromegaly is appreciated  Pulmonary   Respiratory assessment: No respiratory distress, normal respiratory rhythm and effort.    Auscultation of Lungs:  a few diffuse end inspiratory wheezes are noted, no rales or crackles were heard bilaterally. no rhonchi. no friction rub. no diminished breath sounds  Cardiovascular   Auscultation of heart: Apical pulse normal, heart rate and rhythm normal, normal S1 and S2, no murmurs and no pericardial rub.    Exam for edema: No peripheral edema.   Lymphatic   Palpation of lymph nodes in neck: No " cervical lymphadenopathy.   Neurologic   Cranial nerves: Nerves 2-12 were intact, no focal neuro defects. wearing a mask.   Psychiatric   Orientation: Oriented to person, place, and time.    Mood and affect: Normal.     ASSESSMENT/PLAN  Problem List Items Addressed This Visit       Diabetes mellitus type 2 in obese (CMS/HCC) - Primary    Current Assessment & Plan     Continue insulin therapies, Victoza, Farxiga, metformin, recheck labs prior to next visit         Relevant Medications    metFORMIN XR (Glucophage-XR) 500 mg 24 hr tablet    Farxiga 10 mg    Other Relevant Orders    Hemoglobin A1C    Vitamin D 1,25 Dihydroxy    Albumin , Urine Random    Comprehensive Metabolic Panel    CBC and Auto Differential    Essential hypertension    Current Assessment & Plan     Stable today, no med changes         Relevant Medications    spironolactone (Aldactone) 25 mg tablet    Other Relevant Orders    Vitamin D 1,25 Dihydroxy    Comprehensive Metabolic Panel    CBC and Auto Differential    Hypothyroidism    Current Assessment & Plan     Check TSH prior to next visit in 3 months         Relevant Orders    Vitamin D 1,25 Dihydroxy    Comprehensive Metabolic Panel    CBC and Auto Differential    Migraines    Current Assessment & Plan     Ongoing issue, uses Topamax now, alternatives mentioned, including Aimovig and Nurtec. May try options if current symptoms do not improve         Relevant Medications    dexAMETHasone (Decadron) 0.75 mg tablet    topiramate (Topamax) 100 mg tablet    Other Relevant Orders    Vitamin D 1,25 Dihydroxy    Comprehensive Metabolic Panel    CBC and Auto Differential    Occupational asthma    Current Assessment & Plan     No med changes for now, continue follow up with Dr Adame         Relevant Orders    Vitamin D 1,25 Dihydroxy    Comprehensive Metabolic Panel    CBC and Auto Differential    Mixed hyperlipidemia    Current Assessment & Plan     Check labs prior to next visit, no med changes          Relevant Medications    atorvastatin (Lipitor) 40 mg tablet    Other Relevant Orders    Vitamin D 1,25 Dihydroxy    Comprehensive Metabolic Panel    Lipid Panel    CBC and Auto Differential    History of depression    Current Assessment & Plan     Continue fluoxetine and bupropion, follow up in 3 months          Relevant Medications    FLUoxetine (PROzac) 20 mg capsule    Other Relevant Orders    Vitamin D 1,25 Dihydroxy    Comprehensive Metabolic Panel    CBC and Auto Differential         Kimani Li MD

## 2023-08-08 NOTE — ASSESSMENT & PLAN NOTE
Ongoing issue, uses Topamax now, alternatives mentioned, including Aimovig and Nurtec. May try options if current symptoms do not improve

## 2023-08-30 DIAGNOSIS — G43.809 HEADACHE, VARIANT MIGRAINE: ICD-10-CM

## 2023-08-30 PROBLEM — E78.00 HYPERCHOLESTEREMIA: Status: ACTIVE | Noted: 2021-12-06

## 2023-08-30 PROBLEM — J44.9 MILD CHRONIC OBSTRUCTIVE PULMONARY DISEASE (MULTI): Status: ACTIVE | Noted: 2022-01-10

## 2023-08-30 RX ORDER — HYDROCODONE BITARTRATE AND ACETAMINOPHEN 5; 325 MG/1; MG/1
1 TABLET ORAL 2 TIMES DAILY PRN
Qty: 20 TABLET | Refills: 0 | Status: SHIPPED | OUTPATIENT
Start: 2023-08-30 | End: 2023-09-25

## 2023-09-24 DIAGNOSIS — G43.809 HEADACHE, VARIANT MIGRAINE: ICD-10-CM

## 2023-09-25 RX ORDER — INSULIN GLARGINE 100 [IU]/ML
15 INJECTION, SOLUTION SUBCUTANEOUS NIGHTLY
COMMUNITY
Start: 2023-09-07

## 2023-09-25 RX ORDER — HYDROCODONE BITARTRATE AND ACETAMINOPHEN 5; 325 MG/1; MG/1
TABLET ORAL
Qty: 20 TABLET | Refills: 0 | Status: SHIPPED | OUTPATIENT
Start: 2023-09-25 | End: 2023-11-07 | Stop reason: SDUPTHER

## 2023-09-25 NOTE — TELEPHONE ENCOUNTER
Patient states her pharmacy told her the Victoza was discontinued.  She needs a new script sent to [facility] in [facility].  Patient states the script was written incorrect at [facility]  and wants to make sure it is as listed below.    Victoza 1.2 mg twice a day (9 pens at 3 ml totaling 27 ml)    Patient needs a refill on [place]    Patient states she discussed with Dr ALYSSA romero for migraines and she would like to try nurtec

## 2023-11-07 ENCOUNTER — OFFICE VISIT (OUTPATIENT)
Dept: PRIMARY CARE | Facility: CLINIC | Age: 58
End: 2023-11-07
Payer: COMMERCIAL

## 2023-11-07 VITALS
DIASTOLIC BLOOD PRESSURE: 82 MMHG | HEART RATE: 88 BPM | HEIGHT: 64 IN | WEIGHT: 226 LBS | BODY MASS INDEX: 38.58 KG/M2 | RESPIRATION RATE: 16 BRPM | TEMPERATURE: 97.4 F | OXYGEN SATURATION: 93 % | SYSTOLIC BLOOD PRESSURE: 133 MMHG

## 2023-11-07 DIAGNOSIS — E78.2 MIXED HYPERLIPIDEMIA: ICD-10-CM

## 2023-11-07 DIAGNOSIS — G43.719 INTRACTABLE CHRONIC MIGRAINE WITHOUT AURA AND WITHOUT STATUS MIGRAINOSUS: ICD-10-CM

## 2023-11-07 DIAGNOSIS — J44.9 MILD CHRONIC OBSTRUCTIVE PULMONARY DISEASE (MULTI): ICD-10-CM

## 2023-11-07 DIAGNOSIS — E11.69 DIABETES MELLITUS TYPE 2 IN OBESE: ICD-10-CM

## 2023-11-07 DIAGNOSIS — G43.809 HEADACHE, VARIANT MIGRAINE: ICD-10-CM

## 2023-11-07 DIAGNOSIS — G43.711 INTRACTABLE CHRONIC MIGRAINE WITHOUT AURA AND WITH STATUS MIGRAINOSUS: ICD-10-CM

## 2023-11-07 DIAGNOSIS — E66.9 DIABETES MELLITUS TYPE 2 IN OBESE: ICD-10-CM

## 2023-11-07 DIAGNOSIS — E03.9 ACQUIRED HYPOTHYROIDISM: ICD-10-CM

## 2023-11-07 DIAGNOSIS — I10 ESSENTIAL HYPERTENSION: Primary | ICD-10-CM

## 2023-11-07 PROBLEM — H10.33 ACUTE CONJUNCTIVITIS OF BOTH EYES: Status: RESOLVED | Noted: 2023-04-28 | Resolved: 2023-11-07

## 2023-11-07 PROBLEM — H60.92 EXTERNAL OTITIS OF LEFT EAR: Status: RESOLVED | Noted: 2023-04-28 | Resolved: 2023-11-07

## 2023-11-07 PROBLEM — H10.30 CONJUNCTIVITIS, ACUTE: Status: RESOLVED | Noted: 2023-04-28 | Resolved: 2023-11-07

## 2023-11-07 PROCEDURE — 4010F ACE/ARB THERAPY RXD/TAKEN: CPT | Performed by: INTERNAL MEDICINE

## 2023-11-07 PROCEDURE — 99214 OFFICE O/P EST MOD 30 MIN: CPT | Performed by: INTERNAL MEDICINE

## 2023-11-07 PROCEDURE — 1036F TOBACCO NON-USER: CPT | Performed by: INTERNAL MEDICINE

## 2023-11-07 PROCEDURE — 3079F DIAST BP 80-89 MM HG: CPT | Performed by: INTERNAL MEDICINE

## 2023-11-07 PROCEDURE — 3075F SYST BP GE 130 - 139MM HG: CPT | Performed by: INTERNAL MEDICINE

## 2023-11-07 RX ORDER — HYDROCODONE BITARTRATE AND ACETAMINOPHEN 5; 325 MG/1; MG/1
1 TABLET ORAL EVERY 6 HOURS PRN
Qty: 20 TABLET | Refills: 0 | Status: SHIPPED | OUTPATIENT
Start: 2023-11-07 | End: 2023-11-12

## 2023-11-07 NOTE — ASSESSMENT & PLAN NOTE
Patient has TAKEN MULTIPLE MEDS , trial of Nurtec every other day as ordered to see if it is help[ful

## 2023-11-07 NOTE — PROGRESS NOTES
"Chief Complaint/HPI:    stressors at home: home issues have improved, she has been stressed, she is not depressed now     Anxiety/ stressors: patient takes Vistaril for anxiety, she  feels better now, she  takes bupropion 150 mg now, patient has resumed the fluoxetine, she is doing better on the fluoxetine       HA: HA is  occurring a few times per week I have personally reviewed the [facility]AR[facility] report. This report is scanned into the electronic medical record. I have considered the risks of abuse, dependence, addiction and diversion. I believe that it is clinically appropriate to prescribe this medication. Patient uses Norco as needed. She also uses Toradol IM, Phenergan and dexamethasone, HA s have improved recently, she has not used Nurtec however. She gets at least 2 headaches per week. Patient stopped the Topamax, symptoms did not worsen when stopping the Topamax     Obesity: patient went to [facility] bariatrics in [place]. 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     DM type 2: Glucoses have been better, Hgb A1C is 7.3, patient now uses Basiglar instead of Lantus due to insurance issues. She does use SSI, Farxiga, Patient already takes Victoza. She has avoided use of dexamethazone when possible, she started it today due to issue with HA     breast lesion: patient still sees breast specialist at [facility], this is not changed     hypothyroidism : patient takes levothyroxine 88 mcg daily          ROS otherwise negative aside from what was mentioned above in HPI.      Patient Active Problem List "   Diagnosis    Abdominal pain, acute, right upper quadrant    Acute viral syndrome    Allergies    Anxiety and depression    Atypical chest pain    Breast mass, right    Bronchiectasis (CMS/HCC)    Chest heaviness    Chronic headache    Cramp in muscle    Diverticulosis of large intestine    Diabetes mellitus type 2 in obese (CMS/HCC)    Eruption due to drug    Essential hypertension    Frequent urination    Gastroenteritis, acute    Headache, variant migraine    Hematoma of right lower extremity    Hypothyroidism    Metabolic syndrome X    Migraines    Nausea in adult    Neuropathy    Multiple lung nodules on CT    Obesity with body mass index (BMI) of 30.0 to 39.9    Occupational asthma    Mixed hyperlipidemia    Palpitations    Postmenopausal bone loss    Productive cough    Tonsil stone    Tonsillitis    Vitamin D insufficiency    Anxiety    Atherosclerosis of coronary artery    Chronic nonalcoholic liver disease    COPD (chronic obstructive pulmonary disease) (CMS/HCC)    Daytime sleepiness    Diabetes mellitus (CMS/HCC)    Diverticulitis    Fibrocystic breast changes of both breasts    GERD (gastroesophageal reflux disease)    History of depression    History of hypertension    Mastodynia of right breast    PLATA (dyspnea on exertion)    Shortness of breath at rest    Asthma    Sleep apnea    Solitary cyst of left breast    Hematoma of lower extremity    Vitamin D deficiency    Hypercholesteremia    Mild chronic obstructive pulmonary disease (CMS/HCC)         Past Medical History:   Diagnosis Date    Personal history of other diseases of the circulatory system     History of hypertension    Personal history of other diseases of the female genital tract     History of polycystic ovarian syndrome    Personal history of other diseases of the respiratory system     History of asthma    Personal history of other mental and behavioral disorders     History of depression     Past Surgical History:   Procedure  "Laterality Date    OTHER SURGICAL HISTORY  10/25/2019    Hysterectomy    OTHER SURGICAL HISTORY  10/25/2019    Turbinectomy     Social History     Social History Narrative    Not on file         ALLERGIES  Azithromycin, Erythromycin, Fentanyl, Icosapent ethyl, Levofloxacin, Metronidazole, Ondansetron hcl, Penicillins, Erythromycin base, Iodine, and Lisinopril      MEDICATIONS  Current Outpatient Medications on File Prior to Visit   Medication Sig Dispense Refill    albuterol 5 mg/mL nebulizer solution Inhale once daily.      albuterol 90 mcg/actuation inhaler Inhale.      amLODIPine (Norvasc) 5 mg tablet Take 1 tablet (5 mg) by mouth once daily. as directed      aspirin 81 mg EC tablet Take by mouth.      atenolol (Tenormin) 50 mg tablet Take 1 tablet (50 mg) by mouth 2 times a day.      atorvastatin (Lipitor) 40 mg tablet Take 1 tablet (40 mg) by mouth once daily. as directed 90 tablet 1    b complex vitamins capsule Take 1 capsule by mouth 2 times a day.      Basaglar KwikPen U-100 Insulin 100 unit/mL (3 mL) pen inject 15 UNITS subcutaneously once daily at bedtime      BD Integra Syringe 3 mL 25 gauge x 1\" syringe use as directed for TORADOL INJECTION intramuscularly if needed      buPROPion XL (Wellbutrin XL) 150 mg 24 hr tablet Take 1 tablet (150 mg) by mouth once daily.      cholecalciferol (Vitamin D-3) 50 mcg (2,000 unit) capsule Take 1 capsule (2,000 Units) by mouth once daily.      dexAMETHasone (Decadron) 0.75 mg tablet Take 2 tablets (1.5 mg) by mouth once daily with a meal. 90 tablet 1    Droplet Pen Needle 32 gauge x 5/32\" needle       Farxiga 10 mg Take 1 tablet (10 mg) by mouth once daily in the morning. Take before meals. 90 tablet 3    fenofibrate (Triglide) 160 mg tablet Take 1 tablet (160 mg) by mouth once daily.      FLUoxetine (PROzac) 20 mg capsule Take 1 capsule (20 mg) by mouth once daily. 90 capsule 1    fluticasone furoate-vilanteroL (Breo Elipta) 200-25 mcg/dose inhaler Inhale 1 puff once " daily.      fluticasone/vilanterol (FLUTICASONE FUROATE-VILANTEROL INHL) Inhale once daily.      insulin glargine-yfgn 100 unit/mL (3 mL) insulin pen Inject 15 Units under the skin once daily. Take as directed per insulin instructions. 15 mL 3    ketorolac 30 mg/mL (1 mL) injection inject 1 milliliter intramuscularly every 6 hours if needed for migraines      levothyroxine (Synthroid, Levoxyl) 88 mcg tablet Take 1 tablet (88 mcg) by mouth once daily.      liraglutide (Victoza 3-Jordan) 0.6 mg/0.1 mL (18 mg/3 mL) injection Inject 1.2 mg under the skin 2 times a day. 27 mL 3    losartan (Cozaar) 100 mg tablet Take 1 tablet (100 mg) by mouth once daily.      magnesium oxide 500 mg tablet Take by mouth.      metFORMIN XR (Glucophage-XR) 500 mg 24 hr tablet Take 2 tablets (1,000 mg) by mouth 2 times a day. 90 tablet 1    methylPREDNISolone (Medrol Dospak) 4 mg tablets use as directed FOLLOW DIRECTIONS ON BACK OF FOIL PACK      naloxone (Narcan) 4 mg/0.1 mL nasal spray Administer into affected nostril(s).      NovoLOG FlexPen U-100 Insulin 100 unit/mL (3 mL) pen inject 5 units subcutaneously BEFORE MEALS IF GLUCOSE IS OVER 200      omeprazole (PriLOSEC) 40 mg DR capsule Take 1 capsule (40 mg) by mouth once daily in the morning. Take before meals.      OneTouch Ultra Test strip OneTouch Ultra Blue STRP   Refills: 0        Start : 18-Dec-2018   Active      oxybutynin (Ditropan) 5 mg tablet Take by mouth.      promethazine (Phenergan) 25 mg tablet Take 1 tablet (25 mg) by mouth 3 times a day as needed.      Qvar RediHaler 80 mcg/actuation inhaler Inhale 2 Inhalations 2 times a day.      sodium fluoride-pot nitrate 1.1-5 % paste BRUSH TWICE A DAY FOR 2 MINUTES AND DO NTO EAT OR DRINK FOR 1 HOUR AFTER BRUSHING      spironolactone (Aldactone) 25 mg tablet Take 0.5 tablets (12.5 mg) by mouth once daily. 45 tablet 1    tiotropium (Spiriva Respimat) 2.5 mcg/actuation inhaler Inhale 2 puffs once daily.      [DISCONTINUED]  "HYDROcodone-acetaminophen (Norco) 5-325 mg tablet take 1 tablet by mouth twice a day if needed severe pain 20 tablet 0    [DISCONTINUED] topiramate (Topamax) 100 mg tablet Take 1 tablet (100 mg) by mouth 2 times a day. 91 tablet 1    hydrOXYzine pamoate (VistariL) 25 mg capsule Take 1 capsule (25 mg) by mouth 3 times a day as needed for anxiety. 90 capsule 3    [DISCONTINUED] busPIRone (Buspar) 10 mg tablet Take by mouth.      [DISCONTINUED] dexlansoprazole (Dexilant) 60 mg DR capsule Take 1 capsule (60 mg) by mouth once daily.       No current facility-administered medications on file prior to visit.         PHYSICAL EXAM  /82 (BP Location: Right arm, Patient Position: Sitting, BP Cuff Size: Large adult)   Pulse 88   Temp 36.3 °C (97.4 °F)   Resp 16   Ht 1.619 m (5' 3.75\")   Wt 103 kg (226 lb)   SpO2 93%   BMI 39.10 kg/m²   Body mass index is 39.1 kg/m².  Constitutional   General appearance: Abnormal.  well developed, appears healthy, well nourished, morbidly obese . pleasant female, NAD.     Eyes   Inspection of eyes: Sclera and conjunctiva were normal.  Wears glasses  Ears, Nose, Mouth, and Throat   Ears: Auricles: Normal.  Neck is supple, no bruits , no masses, no thyromegaly is appreciated  Pulmonary   Respiratory assessment: No respiratory distress, normal respiratory rhythm and effort.    Auscultation of Lungs:  a few diffuse end expiratory rhonchi are noted, no rales or crackles were heard bilaterally. no rhonchi. no friction rub. no diminished breath sounds  Cardiovascular   Auscultation of heart: Apical pulse normal, heart rate and rhythm normal, normal S1 and S2, no murmurs and no pericardial rub.    Exam for edema: No peripheral edema.   Lymphatic   Palpation of lymph nodes in neck: No cervical lymphadenopathy.   Neurologic   Cranial nerves: Nerves 2-12 were intact, no focal neuro defects. wearing a mask.   Psychiatric   Orientation: Oriented to person, place, and time.    Mood and affect: " Normal.        ASSESSMENT/PLAN  Problem List Items Addressed This Visit       Diabetes mellitus type 2 in obese (CMS/[facility])    Current Assessment & Plan     HgbA1C has improved, she does have an endocrinology appointment scheduled         Relevant Orders    Albumin , Urine Random    Comprehensive Metabolic Panel    Hemoglobin A1C    CBC and Auto Differential    Essential hypertension - Primary    Current Assessment & Plan     BP is stable, no med changes for now         Relevant Orders    Comprehensive Metabolic Panel    CBC and Auto Differential    Headache, variant migraine    Current Assessment & Plan     Patient has TAKEN MULTIPLE MEDS , trial of Nurtec every other day as ordered to see if it is help[ful         Relevant Medications    HYDROcodone-acetaminophen (Norco) 5-325 mg tablet    Other Relevant Orders    Comprehensive Metabolic Panel    CBC and Auto Differential    Hypothyroidism    Current Assessment & Plan     Continue current dose of levothyroxine, recheck labs as scheduled         Relevant Orders    TSH with reflex to Free T4 if abnormal    Comprehensive Metabolic Panel    CBC and Auto Differential    Migraines    Current Assessment & Plan     Trial of Nurtec, multiple meds have been tried, patient stopped Topamax, no worsening of symptoms noted. Nurtec is ordered to see if it is helpful         Relevant Medications    rimegepant (Nurtec ODT) 75 mg tablet,disintegrating    Other Relevant Orders    Comprehensive Metabolic Panel    CBC and Auto Differential    Comprehensive Metabolic Panel    CBC and Auto Differential    Mixed hyperlipidemia    Current Assessment & Plan     Continue current med therapies, recheck labs as ordered         Relevant Orders    Comprehensive Metabolic Panel    CBC and Auto Differential    Mild chronic obstructive pulmonary disease (CMS/[facility])    Current Assessment & Plan     No changes to current regimen, patient does see Dr Adame         Relevant Orders    Comprehensive  Metabolic Panel    CBC and Auto Differential     Follow up in 3 months, check labs in 6 months    Kimani Li MD

## 2023-11-29 ENCOUNTER — TELEPHONE (OUTPATIENT)
Dept: PRIMARY CARE | Facility: CLINIC | Age: 58
End: 2023-11-29
Payer: COMMERCIAL

## 2023-11-29 DIAGNOSIS — G43.711 INTRACTABLE CHRONIC MIGRAINE WITHOUT AURA AND WITH STATUS MIGRAINOSUS: ICD-10-CM

## 2023-11-29 DIAGNOSIS — E11.69 DIABETES MELLITUS TYPE 2 IN OBESE: Primary | ICD-10-CM

## 2023-11-29 DIAGNOSIS — J45.909 ASTHMA, UNSPECIFIED ASTHMA SEVERITY, UNSPECIFIED WHETHER COMPLICATED, UNSPECIFIED WHETHER PERSISTENT (HHS-HCC): ICD-10-CM

## 2023-11-29 DIAGNOSIS — E66.9 DIABETES MELLITUS TYPE 2 IN OBESE: Primary | ICD-10-CM

## 2023-11-29 DIAGNOSIS — R11.0 NAUSEA IN ADULT: ICD-10-CM

## 2023-11-29 DIAGNOSIS — E11.9 TYPE 2 DIABETES MELLITUS WITHOUT COMPLICATION, WITH LONG-TERM CURRENT USE OF INSULIN (MULTI): ICD-10-CM

## 2023-11-29 DIAGNOSIS — I10 ESSENTIAL HYPERTENSION: ICD-10-CM

## 2023-11-29 DIAGNOSIS — F41.9 ANXIETY: ICD-10-CM

## 2023-11-29 DIAGNOSIS — Z79.4 TYPE 2 DIABETES MELLITUS WITHOUT COMPLICATION, WITH LONG-TERM CURRENT USE OF INSULIN (MULTI): ICD-10-CM

## 2023-11-29 DIAGNOSIS — G43.809 OTHER MIGRAINE WITHOUT STATUS MIGRAINOSUS, NOT INTRACTABLE: ICD-10-CM

## 2023-11-29 NOTE — TELEPHONE ENCOUNTER
Pt called and would like someone to call her about her medication.  Since we have switched over to [facility] her medication dosages have been messed up and her pharmacy isn't able to do anything since it is a standing order.  Thanks:)

## 2023-12-05 RX ORDER — LIRAGLUTIDE 6 MG/ML
1.2 INJECTION SUBCUTANEOUS 2 TIMES DAILY
Qty: 3 EACH | Refills: 3 | Status: SHIPPED | OUTPATIENT
Start: 2023-12-05 | End: 2024-02-08 | Stop reason: ALTCHOICE

## 2023-12-05 RX ORDER — PROMETHAZINE HYDROCHLORIDE 25 MG/1
25 TABLET ORAL 3 TIMES DAILY PRN
Qty: 30 TABLET | Refills: 3 | Status: SHIPPED | OUTPATIENT
Start: 2023-12-05 | End: 2024-05-09 | Stop reason: SDUPTHER

## 2023-12-05 RX ORDER — ATENOLOL 50 MG/1
50 TABLET ORAL 2 TIMES DAILY
Qty: 360 TABLET | Refills: 3 | Status: SHIPPED | OUTPATIENT
Start: 2023-12-05 | End: 2024-02-08 | Stop reason: SDUPTHER

## 2023-12-05 RX ORDER — ALBUTEROL SULFATE 5 MG/ML
2.5 SOLUTION RESPIRATORY (INHALATION) EVERY 4 HOURS PRN
Qty: 20 ML | Refills: 3 | Status: SHIPPED | OUTPATIENT
Start: 2023-12-05 | End: 2023-12-06

## 2023-12-05 RX ORDER — DEXAMETHASONE 0.75 MG/1
1.5 TABLET ORAL
Qty: 360 TABLET | Refills: 3 | Status: SHIPPED | OUTPATIENT
Start: 2023-12-05

## 2023-12-05 RX ORDER — HYDROCODONE BITARTRATE AND ACETAMINOPHEN 5; 325 MG/1; MG/1
1 TABLET ORAL 2 TIMES DAILY PRN
COMMUNITY
End: 2023-12-08 | Stop reason: SDUPTHER

## 2023-12-05 RX ORDER — INSULIN ASPART 100 [IU]/ML
5 INJECTION, SOLUTION INTRAVENOUS; SUBCUTANEOUS SEE ADMIN INSTRUCTIONS
Qty: 15 ML | Refills: 3 | Status: SHIPPED | OUTPATIENT
Start: 2023-12-05

## 2023-12-05 RX ORDER — HYDROXYZINE PAMOATE 25 MG/1
25 CAPSULE ORAL 3 TIMES DAILY PRN
Qty: 90 CAPSULE | Refills: 3 | Status: SHIPPED | OUTPATIENT
Start: 2023-12-05

## 2023-12-05 RX ORDER — INSULIN GLARGINE-YFGN 100 [IU]/ML
15 INJECTION, SOLUTION SUBCUTANEOUS DAILY
Qty: 15 ML | Refills: 3 | Status: SHIPPED | OUTPATIENT
Start: 2023-12-05 | End: 2024-12-04

## 2023-12-05 NOTE — TELEPHONE ENCOUNTER
Called patient and updated med list. Medications that were needed were pended and routed to Dr. DON

## 2023-12-06 DIAGNOSIS — F32.A ANXIETY AND DEPRESSION: ICD-10-CM

## 2023-12-06 DIAGNOSIS — E11.69 DIABETES MELLITUS TYPE 2 IN OBESE: Primary | ICD-10-CM

## 2023-12-06 DIAGNOSIS — F41.9 ANXIETY AND DEPRESSION: ICD-10-CM

## 2023-12-06 DIAGNOSIS — G43.719 INTRACTABLE CHRONIC MIGRAINE WITHOUT AURA AND WITHOUT STATUS MIGRAINOSUS: ICD-10-CM

## 2023-12-06 DIAGNOSIS — J45.909 ASTHMA, UNSPECIFIED ASTHMA SEVERITY, UNSPECIFIED WHETHER COMPLICATED, UNSPECIFIED WHETHER PERSISTENT (HHS-HCC): ICD-10-CM

## 2023-12-06 DIAGNOSIS — E66.9 DIABETES MELLITUS TYPE 2 IN OBESE: Primary | ICD-10-CM

## 2023-12-06 RX ORDER — ALBUTEROL SULFATE 5 MG/ML
SOLUTION RESPIRATORY (INHALATION)
Qty: 20 ML | Refills: 3 | Status: SHIPPED | OUTPATIENT
Start: 2023-12-06 | End: 2023-12-15 | Stop reason: WASHOUT

## 2023-12-08 RX ORDER — BUPROPION HYDROCHLORIDE 150 MG/1
150 TABLET ORAL DAILY
Qty: 90 TABLET | Refills: 0 | Status: SHIPPED | OUTPATIENT
Start: 2023-12-08 | End: 2024-02-08 | Stop reason: SDUPTHER

## 2023-12-08 RX ORDER — PEN NEEDLE, DIABETIC 30 GX3/16"
1 NEEDLE, DISPOSABLE MISCELLANEOUS AS NEEDED
Qty: 200 EACH | Refills: 5 | Status: SHIPPED | OUTPATIENT
Start: 2023-12-08

## 2023-12-08 RX ORDER — HYDROCODONE BITARTRATE AND ACETAMINOPHEN 5; 325 MG/1; MG/1
1 TABLET ORAL 2 TIMES DAILY PRN
Qty: 20 TABLET | Refills: 0 | Status: SHIPPED | OUTPATIENT
Start: 2023-12-08 | End: 2024-01-05 | Stop reason: SDUPTHER

## 2023-12-08 NOTE — TELEPHONE ENCOUNTER
Patient is requesting a an Rx for her Norco 5-325 mg BID PRN to be sent to [facility]. It looks like this was discontinued at last visit, please advise.

## 2023-12-15 ENCOUNTER — TELEPHONE (OUTPATIENT)
Dept: PRIMARY CARE | Facility: CLINIC | Age: 58
End: 2023-12-15
Payer: COMMERCIAL

## 2023-12-15 DIAGNOSIS — J45.40: Primary | ICD-10-CM

## 2023-12-15 DIAGNOSIS — Z57.9: Primary | ICD-10-CM

## 2023-12-15 RX ORDER — ALBUTEROL SULFATE 0.83 MG/ML
2.5 SOLUTION RESPIRATORY (INHALATION) 4 TIMES DAILY PRN
Qty: 360 ML | Refills: 11 | Status: SHIPPED | OUTPATIENT
Start: 2023-12-15 | End: 2024-12-14

## 2023-12-15 SDOH — HEALTH STABILITY - PHYSICAL HEALTH: OCCUPATIONAL EXPOSURE TO UNSPECIFIED RISK FACTOR: Z57.9

## 2023-12-19 ENCOUNTER — TELEPHONE (OUTPATIENT)
Dept: PRIMARY CARE | Facility: CLINIC | Age: 58
End: 2023-12-19
Payer: COMMERCIAL

## 2023-12-19 DIAGNOSIS — E11.69 DIABETES MELLITUS TYPE 2 IN OBESE: ICD-10-CM

## 2023-12-19 DIAGNOSIS — E66.9 DIABETES MELLITUS TYPE 2 IN OBESE: ICD-10-CM

## 2023-12-19 NOTE — TELEPHONE ENCOUNTER
"Patient called to say that she spoke with Jeremy last week about filling her metformin and told EJ at the [facility] on 11/7/2023 that this needed to be filled. This has not been rx'd since 8/2022. I don't see any note in the chart that shows that patient called and spoke with Jeremy last week. Pt expressed frustration over this not being filled. Pt stated that this \"shouldn't be that hard\". Please advise.    [facility] [address].   "

## 2023-12-21 DIAGNOSIS — E11.69 DIABETES MELLITUS TYPE 2 IN OBESE: ICD-10-CM

## 2023-12-21 DIAGNOSIS — E66.9 DIABETES MELLITUS TYPE 2 IN OBESE: ICD-10-CM

## 2023-12-21 RX ORDER — METFORMIN HYDROCHLORIDE 500 MG/1
1000 TABLET, EXTENDED RELEASE ORAL 2 TIMES DAILY
Qty: 180 TABLET | Refills: 0 | Status: SHIPPED | OUTPATIENT
Start: 2023-12-21 | End: 2023-12-21 | Stop reason: SDUPTHER

## 2023-12-21 RX ORDER — METFORMIN HYDROCHLORIDE 500 MG/1
1000 TABLET, EXTENDED RELEASE ORAL 2 TIMES DAILY
Qty: 180 TABLET | Refills: 3 | Status: SHIPPED | OUTPATIENT
Start: 2023-12-21 | End: 2024-03-06 | Stop reason: SDUPTHER

## 2024-01-04 ENCOUNTER — TELEPHONE (OUTPATIENT)
Dept: PRIMARY CARE | Facility: CLINIC | Age: 59
End: 2024-01-04
Payer: COMMERCIAL

## 2024-01-04 NOTE — TELEPHONE ENCOUNTER
PATIENT CALLED IN STATING THAT SHE NEEDS A REFILL ON HER HYDROCODONE ACETAMINOPHEN NORCO 5-325 MG TABLET. PLEASE ADVISE

## 2024-01-05 DIAGNOSIS — G43.719 INTRACTABLE CHRONIC MIGRAINE WITHOUT AURA AND WITHOUT STATUS MIGRAINOSUS: ICD-10-CM

## 2024-01-05 RX ORDER — HYDROCODONE BITARTRATE AND ACETAMINOPHEN 5; 325 MG/1; MG/1
1 TABLET ORAL 2 TIMES DAILY PRN
Qty: 20 TABLET | Refills: 0 | Status: SHIPPED | OUTPATIENT
Start: 2024-01-05 | End: 2024-02-08 | Stop reason: SDUPTHER

## 2024-02-08 ENCOUNTER — OFFICE VISIT (OUTPATIENT)
Dept: PRIMARY CARE | Facility: CLINIC | Age: 59
End: 2024-02-08
Payer: COMMERCIAL

## 2024-02-08 VITALS
BODY MASS INDEX: 39.44 KG/M2 | TEMPERATURE: 97.3 F | HEIGHT: 64 IN | HEART RATE: 92 BPM | WEIGHT: 231 LBS | DIASTOLIC BLOOD PRESSURE: 78 MMHG | OXYGEN SATURATION: 97 % | RESPIRATION RATE: 16 BRPM | SYSTOLIC BLOOD PRESSURE: 117 MMHG

## 2024-02-08 DIAGNOSIS — Z86.59 HISTORY OF DEPRESSION: ICD-10-CM

## 2024-02-08 DIAGNOSIS — E03.9 ACQUIRED HYPOTHYROIDISM: ICD-10-CM

## 2024-02-08 DIAGNOSIS — E11.69 DIABETES MELLITUS TYPE 2 IN OBESE: ICD-10-CM

## 2024-02-08 DIAGNOSIS — E78.00 HYPERCHOLESTEREMIA: ICD-10-CM

## 2024-02-08 DIAGNOSIS — E55.9 VITAMIN D DEFICIENCY: ICD-10-CM

## 2024-02-08 DIAGNOSIS — I10 ESSENTIAL HYPERTENSION: Primary | ICD-10-CM

## 2024-02-08 DIAGNOSIS — J45.40: ICD-10-CM

## 2024-02-08 DIAGNOSIS — E66.9 DIABETES MELLITUS TYPE 2 IN OBESE: ICD-10-CM

## 2024-02-08 DIAGNOSIS — J44.9 MILD CHRONIC OBSTRUCTIVE PULMONARY DISEASE (MULTI): ICD-10-CM

## 2024-02-08 DIAGNOSIS — G43.719 INTRACTABLE CHRONIC MIGRAINE WITHOUT AURA AND WITHOUT STATUS MIGRAINOSUS: ICD-10-CM

## 2024-02-08 DIAGNOSIS — F32.A ANXIETY AND DEPRESSION: ICD-10-CM

## 2024-02-08 DIAGNOSIS — G43.711 INTRACTABLE CHRONIC MIGRAINE WITHOUT AURA AND WITH STATUS MIGRAINOSUS: ICD-10-CM

## 2024-02-08 DIAGNOSIS — F41.9 ANXIETY AND DEPRESSION: ICD-10-CM

## 2024-02-08 DIAGNOSIS — Z57.9: ICD-10-CM

## 2024-02-08 DIAGNOSIS — E66.9 OBESITY WITH BODY MASS INDEX (BMI) OF 30.0 TO 39.9: ICD-10-CM

## 2024-02-08 PROBLEM — R53.81 MALAISE AND FATIGUE: Status: ACTIVE | Noted: 2024-01-09

## 2024-02-08 PROBLEM — R53.83 MALAISE AND FATIGUE: Status: ACTIVE | Noted: 2024-01-09

## 2024-02-08 PROCEDURE — 1036F TOBACCO NON-USER: CPT | Performed by: INTERNAL MEDICINE

## 2024-02-08 PROCEDURE — 4010F ACE/ARB THERAPY RXD/TAKEN: CPT | Performed by: INTERNAL MEDICINE

## 2024-02-08 PROCEDURE — 99214 OFFICE O/P EST MOD 30 MIN: CPT | Performed by: INTERNAL MEDICINE

## 2024-02-08 PROCEDURE — 3074F SYST BP LT 130 MM HG: CPT | Performed by: INTERNAL MEDICINE

## 2024-02-08 PROCEDURE — 3078F DIAST BP <80 MM HG: CPT | Performed by: INTERNAL MEDICINE

## 2024-02-08 RX ORDER — AMLODIPINE BESYLATE 5 MG/1
5 TABLET ORAL DAILY
Qty: 90 TABLET | Refills: 1 | Status: SHIPPED | OUTPATIENT
Start: 2024-02-08 | End: 2024-05-02 | Stop reason: SDUPTHER

## 2024-02-08 RX ORDER — FENOFIBRATE 160 MG/1
160 TABLET ORAL DAILY
Qty: 90 TABLET | Refills: 1 | Status: SHIPPED | OUTPATIENT
Start: 2024-02-08

## 2024-02-08 RX ORDER — TIRZEPATIDE 2.5 MG/.5ML
2.5 INJECTION, SOLUTION SUBCUTANEOUS
Qty: 2 ML | Refills: 0 | Status: SHIPPED | OUTPATIENT
Start: 2024-02-08 | End: 2024-03-15

## 2024-02-08 RX ORDER — DAPAGLIFLOZIN 10 MG/1
10 TABLET, FILM COATED ORAL
Qty: 90 TABLET | Refills: 1 | Status: SHIPPED | OUTPATIENT
Start: 2024-02-08 | End: 2024-08-06

## 2024-02-08 RX ORDER — ATENOLOL 50 MG/1
50 TABLET ORAL 2 TIMES DAILY
Qty: 90 TABLET | Refills: 1 | Status: SHIPPED | OUTPATIENT
Start: 2024-02-08 | End: 2024-03-06 | Stop reason: SDUPTHER

## 2024-02-08 RX ORDER — HYDROCODONE BITARTRATE AND ACETAMINOPHEN 5; 325 MG/1; MG/1
1 TABLET ORAL 2 TIMES DAILY PRN
Qty: 20 TABLET | Refills: 0 | Status: SHIPPED | OUTPATIENT
Start: 2024-02-08 | End: 2024-03-04 | Stop reason: SDUPTHER

## 2024-02-08 RX ORDER — LOSARTAN POTASSIUM 100 MG/1
100 TABLET ORAL DAILY
Qty: 90 TABLET | Refills: 1 | Status: SHIPPED | OUTPATIENT
Start: 2024-02-08 | End: 2024-05-02 | Stop reason: SDUPTHER

## 2024-02-08 RX ORDER — LEVOTHYROXINE SODIUM 88 UG/1
88 TABLET ORAL DAILY
Qty: 90 TABLET | Refills: 1 | Status: SHIPPED | OUTPATIENT
Start: 2024-02-08

## 2024-02-08 RX ORDER — TIRZEPATIDE 5 MG/.5ML
5 INJECTION, SOLUTION SUBCUTANEOUS
Qty: 2 ML | Refills: 2 | Status: SHIPPED | OUTPATIENT
Start: 2024-03-06 | End: 2024-03-15 | Stop reason: DRUGHIGH

## 2024-02-08 RX ORDER — FLUOXETINE HYDROCHLORIDE 20 MG/1
20 CAPSULE ORAL DAILY
Qty: 90 CAPSULE | Refills: 1 | Status: SHIPPED | OUTPATIENT
Start: 2024-02-08 | End: 2024-08-06

## 2024-02-08 RX ORDER — BUPROPION HYDROCHLORIDE 150 MG/1
150 TABLET ORAL DAILY
Qty: 90 TABLET | Refills: 1 | Status: SHIPPED | OUTPATIENT
Start: 2024-02-08 | End: 2024-08-06

## 2024-02-08 SDOH — ECONOMIC STABILITY: FOOD INSECURITY: WITHIN THE PAST 12 MONTHS, THE FOOD YOU BOUGHT JUST DIDN'T LAST AND YOU DIDN'T HAVE MONEY TO GET MORE.: NEVER TRUE

## 2024-02-08 SDOH — ECONOMIC STABILITY: FOOD INSECURITY: WITHIN THE PAST 12 MONTHS, YOU WORRIED THAT YOUR FOOD WOULD RUN OUT BEFORE YOU GOT MONEY TO BUY MORE.: NEVER TRUE

## 2024-02-08 SDOH — HEALTH STABILITY - PHYSICAL HEALTH: OCCUPATIONAL EXPOSURE TO UNSPECIFIED RISK FACTOR: Z57.9

## 2024-02-08 ASSESSMENT — PATIENT HEALTH QUESTIONNAIRE - PHQ9
1. LITTLE INTEREST OR PLEASURE IN DOING THINGS: NOT AT ALL
SUM OF ALL RESPONSES TO PHQ9 QUESTIONS 1 & 2: 0
2. FEELING DOWN, DEPRESSED OR HOPELESS: NOT AT ALL

## 2024-02-08 ASSESSMENT — LIFESTYLE VARIABLES
SKIP TO QUESTIONS 9-10: 1
AUDIT-C TOTAL SCORE: 0
HOW OFTEN DO YOU HAVE A DRINK CONTAINING ALCOHOL: NEVER
HOW OFTEN DO YOU HAVE SIX OR MORE DRINKS ON ONE OCCASION: NEVER
HOW MANY STANDARD DRINKS CONTAINING ALCOHOL DO YOU HAVE ON A TYPICAL DAY: PATIENT DOES NOT DRINK

## 2024-02-08 ASSESSMENT — ENCOUNTER SYMPTOMS
LOSS OF SENSATION IN FEET: 0
DEPRESSION: 0
OCCASIONAL FEELINGS OF UNSTEADINESS: 1

## 2024-02-08 NOTE — PROGRESS NOTES
"Chief Complaint/HPI:    stressors at home: home issues have improved, she has been stressed, she is not depressed now, progress is \"slow but better\".     Anxiety/ stressors: patient takes Vistaril for anxiety, she  feels better now, she  takes bupropion 150 mg now, and fluoxetine      HA: HA is  occurring a few times per week I have personally reviewed the OARRS report. This report is scanned into the electronic medical record. I have considered the risks of abuse, dependence, addiction and diversion. I believe that it is clinically appropriate to prescribe this medication. Patient uses Norco as needed. She also uses Toradol IM, Phenergan and dexamethasone, Nyrtec does help with the HA now that the patient uses it. She requires a prescription so that she is able to take it every other day. She no longer takes Topamax     Obesity: patient went to [facility] bariat[facility] in [place]. 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     DM type 2: Glucoses have been better, Hgb A1C is 7.3, patient now uses Basiglar . She does use SSI, Farxiga, Patient already takes Victoza.  Patient would like to try Mounjaro instead of Victoza.      breast lesion: patient still sees breast specialist at [facility], this is not changed     hypothyroidism : patient takes levothyroxine 88 mcg daily           ROS otherwise negative aside from what was mentioned above in HPI.      Patient Active Problem List   Diagnosis    Abdominal pain, acute, right upper quadrant    " Acute viral syndrome    Allergies    Anxiety and depression    Atypical chest pain    Breast mass, right    Bronchiectasis (CMS/HCC)    Chest heaviness    Chronic headache    Cramp in muscle    Diverticulosis of large intestine    Diabetes mellitus type 2 in obese (CMS/HCC)    Eruption due to drug    Essential hypertension    Frequent urination    Gastroenteritis, acute    Headache, variant migraine    Hematoma of right lower extremity    Hypothyroidism    Metabolic syndrome X    Migraines    Nausea in adult    Neuropathy    Multiple lung nodules on CT    Obesity with body mass index (BMI) of 30.0 to 39.9    Occupational asthma    Mixed hyperlipidemia    Palpitations    Postmenopausal bone loss    Productive cough    Tonsil stone    Tonsillitis    Vitamin D insufficiency    Anxiety    Atherosclerosis of coronary artery    Chronic nonalcoholic liver disease    COPD (chronic obstructive pulmonary disease) (CMS/HCC)    Daytime sleepiness    Diabetes mellitus (CMS/HCC)    Diverticulitis    Fibrocystic breast changes of both breasts    GERD (gastroesophageal reflux disease)    History of depression    History of hypertension    Mastodynia of right breast    PLATA (dyspnea on exertion)    Shortness of breath at rest    Asthma    Sleep apnea    Solitary cyst of left breast    Hematoma of lower extremity    Vitamin D deficiency    Hypercholesteremia    Mild chronic obstructive pulmonary disease (CMS/HCC)    Malaise and fatigue         Past Medical History:   Diagnosis Date    Personal history of other diseases of the circulatory system     History of hypertension    Personal history of other diseases of the female genital tract     History of polycystic ovarian syndrome    Personal history of other diseases of the respiratory system     History of asthma    Personal history of other mental and behavioral disorders     History of depression     Past Surgical History:   Procedure Laterality Date    OTHER SURGICAL HISTORY   "10/25/2019    Hysterectomy    OTHER SURGICAL HISTORY  10/25/2019    Turbinectomy     Social History     Social History Narrative    Not on file         ALLERGIES  Azithromycin, Erythromycin, Fentanyl, Icosapent ethyl, Levofloxacin, Metronidazole, Ondansetron hcl, Penicillins, Erythromycin base, Iodine, and Lisinopril      MEDICATIONS  Current Outpatient Medications on File Prior to Visit   Medication Sig Dispense Refill    albuterol 2.5 mg /3 mL (0.083 %) nebulizer solution Take 3 mL (2.5 mg) by nebulization 4 times a day as needed for wheezing or shortness of breath. 360 mL 11    aspirin 81 mg EC tablet Take by mouth.      b complex vitamins capsule Take 1 capsule by mouth 2 times a day.      Basaglar KwikPen U-100 Insulin 100 unit/mL (3 mL) pen Inject 15 Units under the skin once daily at bedtime.      BD Integra Syringe 3 mL 25 gauge x 1\" syringe use as directed for TORADOL INJECTION intramuscularly if needed      cholecalciferol (Vitamin D-3) 50 mcg (2,000 unit) capsule Take 1 capsule (50 mcg) by mouth once daily.      dexAMETHasone (Decadron) 0.75 mg tablet Take 2 tablets (1.5 mg) by mouth 2 times a day with meals. 360 tablet 3    fluticasone furoate-vilanteroL (Breo Elipta) 200-25 mcg/dose inhaler Inhale 1 puff once daily.      hydrOXYzine pamoate (VistariL) 25 mg capsule Take 1 capsule (25 mg) by mouth 3 times a day as needed for anxiety. 90 capsule 3    ketorolac 30 mg/mL (1 mL) injection inject 1 milliliter intramuscularly every 6 hours if needed for migraines      liraglutide (Victoza 3-Jordan) 0.6 mg/0.1 mL (18 mg/3 mL) injection Inject 0.2 mL (1.2 mg) under the skin 2 times a day. 3 each 3    magnesium oxide 500 mg tablet Take 1 tablet (500 mg) by mouth 2 times a day.      metFORMIN  mg 24 hr tablet Take 2 tablets (1,000 mg) by mouth 2 times a day. 180 tablet 3    methylPREDNISolone (Medrol Dospak) 4 mg tablets use as directed FOLLOW DIRECTIONS ON BACK OF FOIL PACK      naloxone (Narcan) 4 mg/0.1 mL " "nasal spray Administer into affected nostril(s).      NovoLOG Flexpen U-100 Insulin 100 unit/mL (3 mL) pen Inject 5 Units under the skin see administration instructions. Inject 5 units under the skin with meals if glucose is over 200. 15 mL 3    omeprazole (PriLOSEC) 40 mg DR capsule Take 1 capsule (40 mg) by mouth once daily in the morning. Take before meals.      OneTouch Ultra Test strip OneTouch Ultra Blue STRP   Refills: 0        Start : 18-Dec-2018   Active      pen needle, diabetic (Droplet Pen Needle) 32 gauge x 5/32\" needle Inject 1 Pen needle under the skin if needed (with insulin injections). 200 each 5    promethazine (Phenergan) 25 mg tablet Take 1 tablet (25 mg) by mouth 3 times a day as needed for nausea or vomiting. 30 tablet 3    Qvar RediHaler 80 mcg/actuation inhaler Inhale 2 Inhalations 2 times a day.      sodium fluoride-pot nitrate 1.1-5 % paste BRUSH TWICE A DAY FOR 2 MINUTES AND DO NTO EAT OR DRINK FOR 1 HOUR AFTER BRUSHING      tiotropium (Spiriva Respimat) 2.5 mcg/actuation inhaler Inhale 2 puffs once daily.      [DISCONTINUED] amLODIPine (Norvasc) 5 mg tablet Take 1 tablet (5 mg) by mouth once daily. as directed      [DISCONTINUED] atenolol (Tenormin) 50 mg tablet Take 1 tablet (50 mg) by mouth 2 times a day. 360 tablet 3    [DISCONTINUED] buPROPion XL (Wellbutrin XL) 150 mg 24 hr tablet Take 1 tablet (150 mg) by mouth once daily. 90 tablet 0    [DISCONTINUED] Farxiga 10 mg Take 1 tablet (10 mg) by mouth once daily in the morning. Take before meals. 90 tablet 3    [DISCONTINUED] fenofibrate (Triglide) 160 mg tablet Take 1 tablet (160 mg) by mouth once daily.      [DISCONTINUED] FLUoxetine (PROzac) 20 mg capsule Take 1 capsule (20 mg) by mouth once daily. 90 capsule 1    [DISCONTINUED] HYDROcodone-acetaminophen (Norco) 5-325 mg tablet Take 1 tablet by mouth 2 times a day as needed for severe pain (7 - 10). 20 tablet 0    [DISCONTINUED] levothyroxine (Synthroid, Levoxyl) 88 mcg tablet Take 1 " "tablet (88 mcg) by mouth once daily.      [DISCONTINUED] losartan (Cozaar) 100 mg tablet Take 1 tablet (100 mg) by mouth once daily.      [DISCONTINUED] rimegepant (Nurtec ODT) 75 mg tablet,disintegrating Take 1 tablet (75 mg) by mouth every other day. Send to [facility] [phone] 15 tablet 3    atorvastatin (Lipitor) 40 mg tablet Take 1 tablet (40 mg) by mouth once daily. as directed 90 tablet 1    insulin glargine-yfgn 100 unit/mL (3 mL) Pen Inject 15 Units under the skin once daily. Take as directed per insulin instructions. 15 mL 3    spironolactone (Aldactone) 25 mg tablet Take 0.5 tablets (12.5 mg) by mouth once daily. 45 tablet 1    [DISCONTINUED] oxybutynin (Ditropan) 5 mg tablet Take by mouth.       No current facility-administered medications on file prior to visit.         PHYSICAL EXAM  /78 (BP Location: Right arm, Patient Position: Sitting, BP Cuff Size: Large adult)   Pulse 92   Temp 36.3 °C (97.3 °F)   Resp 16   Ht 1.619 m (5' 3.75\")   Wt 105 kg (231 lb)   SpO2 97%   BMI 39.96 kg/m²   Body mass index is 39.96 kg/m².  Constitutional   General appearance: Abnormal.  well developed, appears healthy, well nourished, morbidly obese . pleasant female, NAD.     Eyes   Inspection of eyes: Sclera and conjunctiva were normal.  Wears glasses  Ears, Nose, Mouth, and Throat   Ears: Auricles: Normal.  Neck is supple, no bruits , no masses, no thyromegaly is appreciated  Pulmonary   Respiratory assessment: No respiratory distress, normal respiratory rhythm and effort.    Auscultation of Lungs:  a few diffuse end expiratory wheezes are noted, no rales or crackles were heard bilaterally. no rhonchi. no friction rub. no diminished breath sounds  Cardiovascular   Auscultation of heart: Apical pulse normal, heart rate and rhythm normal, normal S1 and S2, no murmurs and no pericardial rub.    Exam for edema: No peripheral edema.   Lymphatic   Palpation of lymph nodes in " neck: No cervical lymphadenopathy.   Neurologic   Cranial nerves: Nerves 2-12 were intact, no focal neuro defects. wearing a mask.   Psychiatric   Orientation: Oriented to person, place, and time.    Mood and affect: Normal.     ASSESSMENT/PLAN  Problem List Items Addressed This Visit       Anxiety and depression    Relevant Medications    buPROPion XL (Wellbutrin XL) 150 mg 24 hr tablet    Diabetes mellitus type 2 in obese (CMS/HCC)    Relevant Medications    dapagliflozin propanediol (Farxiga) 10 mg    tirzepatide (Mounjaro) 2.5 mg/0.5 mL pen injector    tirzepatide (Mounjaro) 5 mg/0.5 mL pen injector (Start on 3/6/2024)    Essential hypertension - Primary    Relevant Medications    losartan (Cozaar) 100 mg tablet    amLODIPine (Norvasc) 5 mg tablet    atenolol (Tenormin) 50 mg tablet    Hypothyroidism    Relevant Medications    levothyroxine (Synthroid, Levoxyl) 88 mcg tablet    Migraines    Relevant Medications    rimegepant (Nurtec ODT) 75 mg tablet,disintegrating    HYDROcodone-acetaminophen (Norco) 5-325 mg tablet    Obesity with body mass index (BMI) of 30.0 to 39.9    Relevant Medications    tirzepatide (Mounjaro) 2.5 mg/0.5 mL pen injector    tirzepatide (Mounjaro) 5 mg/0.5 mL pen injector (Start on 3/6/2024)    Occupational asthma    History of depression    Relevant Medications    FLUoxetine (PROzac) 20 mg capsule    Vitamin D deficiency    Hypercholesteremia    Relevant Medications    fenofibrate (Triglide) 160 mg tablet     Check labs in 4/2024 as already scheduled    Follow up in 3 months    Kimani Li MD

## 2024-03-04 ENCOUNTER — TELEPHONE (OUTPATIENT)
Dept: PRIMARY CARE | Facility: CLINIC | Age: 59
End: 2024-03-04
Payer: COMMERCIAL

## 2024-03-04 DIAGNOSIS — G43.719 INTRACTABLE CHRONIC MIGRAINE WITHOUT AURA AND WITHOUT STATUS MIGRAINOSUS: ICD-10-CM

## 2024-03-04 RX ORDER — HYDROCODONE BITARTRATE AND ACETAMINOPHEN 5; 325 MG/1; MG/1
1 TABLET ORAL 2 TIMES DAILY PRN
Qty: 20 TABLET | Refills: 0 | Status: SHIPPED | OUTPATIENT
Start: 2024-03-04 | End: 2024-04-02 | Stop reason: SDUPTHER

## 2024-03-06 ENCOUNTER — TELEPHONE (OUTPATIENT)
Dept: PRIMARY CARE | Facility: CLINIC | Age: 59
End: 2024-03-06
Payer: COMMERCIAL

## 2024-03-06 DIAGNOSIS — E11.69 DIABETES MELLITUS TYPE 2 IN OBESE: ICD-10-CM

## 2024-03-06 DIAGNOSIS — E66.9 DIABETES MELLITUS TYPE 2 IN OBESE: ICD-10-CM

## 2024-03-06 DIAGNOSIS — I10 ESSENTIAL HYPERTENSION: ICD-10-CM

## 2024-03-06 RX ORDER — ATENOLOL 50 MG/1
50 TABLET ORAL 2 TIMES DAILY
Qty: 180 TABLET | Refills: 1 | Status: SHIPPED | OUTPATIENT
Start: 2024-03-06

## 2024-03-06 RX ORDER — METFORMIN HYDROCHLORIDE 500 MG/1
1000 TABLET, EXTENDED RELEASE ORAL 2 TIMES DAILY
Qty: 360 TABLET | Refills: 1 | Status: SHIPPED | OUTPATIENT
Start: 2024-03-06

## 2024-03-06 NOTE — TELEPHONE ENCOUNTER
Pt called in and stated that her Atenolol and Metformin were called in with the incorrect quantity. Pt states she normally gets enough dispensed for 3 months. Pt is requesting this so she does not have to refill every 6 weeks, please advise.

## 2024-03-06 NOTE — TELEPHONE ENCOUNTER
Called patient, notified of message, patient expressed verbal understanding had no questions at this time.

## 2024-03-11 ENCOUNTER — TELEPHONE (OUTPATIENT)
Dept: PRIMARY CARE | Facility: CLINIC | Age: 59
End: 2024-03-11
Payer: COMMERCIAL

## 2024-03-14 DIAGNOSIS — E11.69 DIABETES MELLITUS TYPE 2 IN OBESE: ICD-10-CM

## 2024-03-14 DIAGNOSIS — E66.9 DIABETES MELLITUS TYPE 2 IN OBESE: ICD-10-CM

## 2024-03-14 DIAGNOSIS — E66.9 OBESITY WITH BODY MASS INDEX (BMI) OF 30.0 TO 39.9: ICD-10-CM

## 2024-03-15 RX ORDER — TIRZEPATIDE 5 MG/.5ML
5 INJECTION, SOLUTION SUBCUTANEOUS
Qty: 6 ML | Refills: 1 | Status: SHIPPED | OUTPATIENT
Start: 2024-03-15 | End: 2024-04-08 | Stop reason: ALTCHOICE

## 2024-03-15 RX ORDER — TIRZEPATIDE 5 MG/.5ML
5 INJECTION, SOLUTION SUBCUTANEOUS
Qty: 6 ML | Refills: 1 | Status: SHIPPED | OUTPATIENT
Start: 2024-03-15 | End: 2024-03-20 | Stop reason: SDUPTHER

## 2024-03-19 ENCOUNTER — TELEPHONE (OUTPATIENT)
Dept: PRIMARY CARE | Facility: CLINIC | Age: 59
End: 2024-03-19
Payer: COMMERCIAL

## 2024-03-19 DIAGNOSIS — E11.69 DIABETES MELLITUS TYPE 2 IN OBESE: ICD-10-CM

## 2024-03-19 DIAGNOSIS — E66.9 DIABETES MELLITUS TYPE 2 IN OBESE: ICD-10-CM

## 2024-03-19 DIAGNOSIS — Z79.899 MEDICATION MANAGEMENT: ICD-10-CM

## 2024-03-19 DIAGNOSIS — E66.9 OBESITY WITH BODY MASS INDEX (BMI) OF 30.0 TO 39.9: ICD-10-CM

## 2024-03-19 DIAGNOSIS — E78.2 MIXED HYPERLIPIDEMIA: Primary | ICD-10-CM

## 2024-03-20 RX ORDER — TIRZEPATIDE 5 MG/.5ML
5 INJECTION, SOLUTION SUBCUTANEOUS
Qty: 6 ML | Refills: 1 | Status: SHIPPED | OUTPATIENT
Start: 2024-03-20 | End: 2024-04-08 | Stop reason: ALTCHOICE

## 2024-03-20 NOTE — TELEPHONE ENCOUNTER
Called patient, notified of message, patient expressed verbal understanding had no questions at this time.    
Pt called in stating that she is not able to get into the pharmacist until 4/8 and she needs her mounjaro sooner. Pt would like to know if Dr. SHAH can just call in a refill for her this time because she is out..  
Pt called in stating that there is a shortage on the Tirzepatide (Mounjaro) 5 mg/0.5 mL pen injector  until August. Pt states that her pharmacy does have the 2.5 in stock or she wants to see about an alternative. Please advise.   
Pt. resting comfortably with family at bedside, in no apparent distress at this time, will continue to monitor.
Pt. resting with family at bedside, in no apparent distress at this time, will continue to monitor.
1915 received report from Kait MENDEZ. Pt. receiving breathing treatment at this time with family at bedside, will continue to monitor.

## 2024-04-02 ENCOUNTER — TELEPHONE (OUTPATIENT)
Dept: PRIMARY CARE | Facility: CLINIC | Age: 59
End: 2024-04-02
Payer: COMMERCIAL

## 2024-04-02 DIAGNOSIS — G43.719 INTRACTABLE CHRONIC MIGRAINE WITHOUT AURA AND WITHOUT STATUS MIGRAINOSUS: ICD-10-CM

## 2024-04-02 RX ORDER — HYDROCODONE BITARTRATE AND ACETAMINOPHEN 5; 325 MG/1; MG/1
1 TABLET ORAL 2 TIMES DAILY PRN
Qty: 20 TABLET | Refills: 0 | Status: SHIPPED | OUTPATIENT
Start: 2024-04-02 | End: 2024-04-22 | Stop reason: SDUPTHER

## 2024-04-08 ENCOUNTER — TELEMEDICINE (OUTPATIENT)
Dept: PHARMACY | Facility: HOSPITAL | Age: 59
End: 2024-04-08
Payer: COMMERCIAL

## 2024-04-08 ENCOUNTER — PHARMACY VISIT (OUTPATIENT)
Dept: PHARMACY | Facility: CLINIC | Age: 59
End: 2024-04-08
Payer: MEDICARE

## 2024-04-08 DIAGNOSIS — E11.69 TYPE 2 DIABETES MELLITUS WITH OBESITY (MULTI): Primary | ICD-10-CM

## 2024-04-08 DIAGNOSIS — E66.9 TYPE 2 DIABETES MELLITUS WITH OBESITY (MULTI): Primary | ICD-10-CM

## 2024-04-08 DIAGNOSIS — Z79.899 MEDICATION MANAGEMENT: ICD-10-CM

## 2024-04-08 DIAGNOSIS — E66.9 OBESITY WITH BODY MASS INDEX (BMI) OF 30.0 TO 39.9: ICD-10-CM

## 2024-04-08 PROCEDURE — RXMED WILLOW AMBULATORY MEDICATION CHARGE

## 2024-04-08 RX ORDER — TIRZEPATIDE 5 MG/.5ML
5 INJECTION, SOLUTION SUBCUTANEOUS
Qty: 2 ML | Refills: 1 | Status: SHIPPED | OUTPATIENT
Start: 2024-04-08 | End: 2024-04-30 | Stop reason: ALTCHOICE

## 2024-04-08 NOTE — PROGRESS NOTES
Pharmacist Clinic: Diabetes Management  Mary Jean Baptiste is a 58 y.o. female was referred to Clinical Pharmacy Team for diabetes management.   Referring Provider: Kimani Li MD     Subjective   Diabetes  She presents for her initial diabetic visit. She has type 2 diabetes mellitus. There are no hypoglycemic associated symptoms. There are no diabetic associated symptoms. There are no hypoglycemic complications. There are no diabetic complications. Current diabetic treatment includes oral agent (triple therapy) and insulin injections.     HPI    - Started Mounjaro at 2.5 mg; has been out for a couple weeks d/t stock issues  - Dr. SHAH sent in Rx for Mounjaro 5 mg but unavailable at her pharmacy; currently in stock at [facility]    Current diet:   - Overall tries to watch carb/starch intake    Current monitoring regimen:   Patient is using: glucometer    Reported blood sugars:   180's in the morning more recently  Pre-meals = 160, 83, 182, 192  After dinner = 191    Any episodes of hypoglycemia? no    Adverse Effects:   None    Allergies   Allergen Reactions    Azithromycin Other    Erythromycin Unknown    Fentanyl Unknown and Other     Other reaction(s): doesnt work, Other (See Comments), Unknown   NON THERAPUTIC    NON THERAPUTIC    Icosapent Ethyl Unknown    Levofloxacin Unknown    Metronidazole Unknown    Ondansetron Hcl Unknown    Penicillins Unknown    Erythromycin Base Rash    Iodine Rash and Unknown     Other reaction(s): Other (See Comments), Unknown   Chemical peritonitis    Chemical peritonitis    Lisinopril Rash       Objective     There were no vitals taken for this visit.    Diabetes Pharmacotherapy:    Mounjaro 2.5 mg weekly  Basaglar 15 units daily  Novolog SS  Farxiga 10 mg daily  Metformin 1000 mg twice daily    Historical Pharmacotherapy  Victoza 1.2 mg daily    SECONDARY PREVENTION  - Statin? Yes   - ACE-I/ARB? Yes  - Aspirin? Yes    Pertinent PMH Review:  - PMH of Pancreatitis: No  - PMH of  "Retinopathy: No  - PMH of Urinary Tract Infections: No  - PMH of MTC: No    Lab Review  Lab Results   Component Value Date    BILITOT 0.4 02/12/2021    CALCIUM 9.4 02/12/2021    CO2 26 02/12/2021     02/12/2021    CREATININE 0.80 02/12/2021    GLUCOSE 134 (H) 02/12/2021    ALKPHOS 59 02/12/2021    K 4.2 02/12/2021    PROT 6.8 02/12/2021     02/12/2021    AST 14 02/12/2021    ALT 21 02/12/2021    BUN 20 02/12/2021    ANIONGAP 11 02/12/2021    ALBUMIN 4.3 02/12/2021    AMYLASE 32 02/12/2021    LIPASE 43 02/12/2021     No results found for: \"TRIG\", \"CHOL\", \"LDL\", \"HDL\"  Lab Results   Component Value Date    HGBA1C 7.3 (H) 10/26/2023    HGBA1C 7.6 (H) 05/03/2023    HGBA1C 7.1 (H) 11/04/2022     The ASCVD Risk score (Esperanza SINGLETARY, et al., 2019) failed to calculate for the following reasons:    Cannot find a previous HDL lab    Cannot find a previous total cholesterol lab    Drug Interactions:  None    Assessment/Plan   Problem List Items Addressed This Visit       Type 2 diabetes mellitus with obesity (CMS/[facility]) - Primary     Patients diabetes is stable with most recent A1c of 7.3% (Goal < 7%).   Increase Mounjaro to 5 mg weekly. Sent Rx to [facility] for patient to  as they have it in stock currently.  Continue all other medications.  Continue testing blood sugars daily  Compliance at present is estimated to be excellent.  Follow-up: 4/30 10am         Relevant Orders    Follow Up In Clinical Pharmacy    Obesity with body mass index (BMI) of 30.0 to 39.9    Relevant Medications    tirzepatide (Mounjaro) 5 mg/0.5 mL pen injector     Other Visit Diagnoses       Medication management              Al DenneyD, [facility]  Clinical Pharmacist     Continue all meds under the continuation of care with the referring provider and clinical pharmacy team.  "

## 2024-04-09 ASSESSMENT — ENCOUNTER SYMPTOMS: DIABETIC ASSOCIATED SYMPTOMS: 0

## 2024-04-09 NOTE — ASSESSMENT & PLAN NOTE
Patients diabetes is stable with most recent A1c of 7.3% (Goal < 7%).   Increase Mounjaro to 5 mg weekly. Sent Rx to  [facility] for patient to  as they have it in stock currently.  Continue all other medications.  Continue testing blood sugars daily  Compliance at present is estimated to be excellent.  Follow-up: 4/30 10am

## 2024-04-22 ENCOUNTER — TELEPHONE (OUTPATIENT)
Dept: PRIMARY CARE | Facility: CLINIC | Age: 59
End: 2024-04-22
Payer: COMMERCIAL

## 2024-04-22 DIAGNOSIS — G43.719 INTRACTABLE CHRONIC MIGRAINE WITHOUT AURA AND WITHOUT STATUS MIGRAINOSUS: ICD-10-CM

## 2024-04-23 ENCOUNTER — TELEPHONE (OUTPATIENT)
Dept: PRIMARY CARE | Facility: CLINIC | Age: 59
End: 2024-04-23

## 2024-04-23 DIAGNOSIS — E78.2 MIXED HYPERLIPIDEMIA: Primary | ICD-10-CM

## 2024-04-23 NOTE — TELEPHONE ENCOUNTER
Patient called in asking if you could order her a lipid panel lab    *She would like her other labs faxed over somewhere to get them drawn before her appt. Patient hung up the phone so fast she did not provide fax number or location to where she would like the orders sent. Will get number when lipid panel is placed and we call patient back

## 2024-04-25 RX ORDER — HYDROCODONE BITARTRATE AND ACETAMINOPHEN 5; 325 MG/1; MG/1
1 TABLET ORAL 2 TIMES DAILY PRN
Qty: 20 TABLET | Refills: 0 | Status: SHIPPED | OUTPATIENT
Start: 2024-04-25 | End: 2024-05-10 | Stop reason: SDUPTHER

## 2024-04-30 ENCOUNTER — TELEMEDICINE (OUTPATIENT)
Dept: PHARMACY | Facility: HOSPITAL | Age: 59
End: 2024-04-30
Payer: COMMERCIAL

## 2024-04-30 DIAGNOSIS — E66.9 TYPE 2 DIABETES MELLITUS WITH OBESITY (MULTI): ICD-10-CM

## 2024-04-30 DIAGNOSIS — E11.69 TYPE 2 DIABETES MELLITUS WITH OBESITY (MULTI): ICD-10-CM

## 2024-04-30 RX ORDER — SEMAGLUTIDE 0.68 MG/ML
0.5 INJECTION, SOLUTION SUBCUTANEOUS
Qty: 3 ML | Refills: 0 | Status: SHIPPED | OUTPATIENT
Start: 2024-04-30 | End: 2024-05-21 | Stop reason: ALTCHOICE

## 2024-04-30 ASSESSMENT — ENCOUNTER SYMPTOMS: DIABETIC ASSOCIATED SYMPTOMS: 0

## 2024-04-30 NOTE — PROGRESS NOTES
Pharmacist Clinic: Diabetes Management  Mary Jean Baptiste is a 58 y.o. female was referred to Clinical Pharmacy Team for diabetes management.   Referring Provider: Kimani Li MD     Subjective   Diabetes  She presents for her initial diabetic visit. She has type 2 diabetes mellitus. There are no hypoglycemic associated symptoms. There are no diabetic associated symptoms. There are no hypoglycemic complications. There are no diabetic complications. Current diabetic treatment includes oral agent (triple therapy) and insulin injections.     HPI    Current diet:   - Significant reduction in cravings/appetite; it wears off at end of week for each injection  - Overall tries to watch carb/starch intake    Weight loss:  - 6 lbs    Current monitoring regimen:   Patient is using: glucometer; also has freestyle vika 3 now    Reported blood sugars:   FBG - 100-130    Any episodes of hypoglycemia? 1-2 that were 68 & 70     Adverse Effects:   A little nausea with this dose, but tolerable    Allergies   Allergen Reactions    Azithromycin Other    Erythromycin Unknown    Fentanyl Unknown and Other     Other reaction(s): doesnt work, Other (See Comments), Unknown   NON THERAPUTIC    NON THERAPUTIC    Icosapent Ethyl Unknown    Levofloxacin Unknown    Metronidazole Unknown    Ondansetron Hcl Unknown    Penicillins Unknown    Erythromycin Base Rash    Iodine Rash and Unknown     Other reaction(s): Other (See Comments), Unknown   Chemical peritonitis    Chemical peritonitis    Lisinopril Rash       Objective     There were no vitals taken for this visit.    Diabetes Pharmacotherapy:    Mounjaro 5 mg weekly (Saturdays)  Basaglar 15 units daily  Novolog SS  Farxiga 10 mg daily  Metformin 1000 mg twice daily    Historical Pharmacotherapy  Victoza 1.2 mg daily    SECONDARY PREVENTION  - Statin? Yes   - ACE-I/ARB? Yes  - Aspirin? Yes    Pertinent PMH Review:  - PMH of Pancreatitis: No  - PMH of Retinopathy: No  - PMH of Urinary  "Tract Infections: No  - PMH of MTC: No    Lab Review  Lab Results   Component Value Date    BILITOT 0.4 02/12/2021    CALCIUM 9.4 02/12/2021    CO2 26 02/12/2021     02/12/2021    CREATININE 0.80 02/12/2021    GLUCOSE 134 (H) 02/12/2021    ALKPHOS 59 02/12/2021    K 4.2 02/12/2021    PROT 6.8 02/12/2021     02/12/2021    AST 14 02/12/2021    ALT 21 02/12/2021    BUN 20 02/12/2021    ANIONGAP 11 02/12/2021    ALBUMIN 4.3 02/12/2021    AMYLASE 32 02/12/2021    LIPASE 43 02/12/2021     No results found for: \"TRIG\", \"CHOL\", \"LDL\", \"HDL\"  Lab Results   Component Value Date    HGBA1C 7.3 (A) 04/11/2024    HGBA1C 7.3 (H) 10/26/2023    HGBA1C 7.6 (H) 05/03/2023     The ASCVD Risk score (Esperanza DK, et al., 2019) failed to calculate for the following reasons:    Cannot find a previous HDL lab    Cannot find a previous total cholesterol lab    Drug Interactions:  None    Assessment/Plan   Problem List Items Addressed This Visit       Type 2 diabetes mellitus with obesity (Multi)     Patients diabetes is stable with most recent A1c of 7.3% (Goal < 7%).   CHANGE Mounjaro to Ozempic 0.5 mg weekly due to stock issues  Continue all other medications. Recommend she decrease Basaglar by 2 units if she has anymore overnight lows  Continue testing blood sugars continuously with freestyle vika 3  Reordered blood work for appointment with Dr. HSAH; previous orders were not set to release until 5/7 and she wants to get them prior to her appt on Thursday  Compliance at present is estimated to be excellent.  Follow-up: 5/21 10am         Relevant Medications    semaglutide (Ozempic) 0.25 mg or 0.5 mg (2 mg/3 mL) pen injector    Other Relevant Orders    Hemoglobin A1c    CBC and Auto Differential    Comprehensive metabolic panel    Albumin, urine, random    Tsh With Reflex To Free T4 If Abnormal    Follow Up In Clinical Pharmacy       Kelsea Kaur, PharmD, [facility]  Clinical Pharmacist     Continue all meds under the continuation of " care with the referring provider and clinical pharmacy team.

## 2024-04-30 NOTE — ASSESSMENT & PLAN NOTE
Patients diabetes is stable with most recent A1c of 7.3% (Goal < 7%).   CHANGE Mounjaro to Ozempic 0.5 mg weekly due to stock issues  Continue all other medications. Recommend she decrease Basaglar by 2 units if she has anymore overnight lows  Continue testing blood sugars continuously with freestyle vika 3  Reordered blood work for appointment with Dr. SHAH; previous orders were not set to release until 5/7 and she wants to get them prior to her appt on Thursday  Compliance at present is estimated to be excellent.  Follow-up: 5/21 10am

## 2024-05-02 ENCOUNTER — OFFICE VISIT (OUTPATIENT)
Dept: PRIMARY CARE | Facility: CLINIC | Age: 59
End: 2024-05-02
Payer: COMMERCIAL

## 2024-05-02 VITALS
TEMPERATURE: 97.4 F | BODY MASS INDEX: 37.69 KG/M2 | RESPIRATION RATE: 16 BRPM | DIASTOLIC BLOOD PRESSURE: 77 MMHG | HEART RATE: 78 BPM | HEIGHT: 64 IN | WEIGHT: 220.8 LBS | OXYGEN SATURATION: 95 % | SYSTOLIC BLOOD PRESSURE: 120 MMHG

## 2024-05-02 DIAGNOSIS — Z23 NEED FOR SHINGLES VACCINE: ICD-10-CM

## 2024-05-02 DIAGNOSIS — R22.43 LOCALIZED SWELLING OF BOTH LOWER LEGS: ICD-10-CM

## 2024-05-02 DIAGNOSIS — G89.29 CHRONIC INTRACTABLE HEADACHE, UNSPECIFIED HEADACHE TYPE: ICD-10-CM

## 2024-05-02 DIAGNOSIS — E11.69 TYPE 2 DIABETES MELLITUS WITH OBESITY (MULTI): ICD-10-CM

## 2024-05-02 DIAGNOSIS — I10 ESSENTIAL HYPERTENSION: ICD-10-CM

## 2024-05-02 DIAGNOSIS — Z79.899 MEDICATION MANAGEMENT: ICD-10-CM

## 2024-05-02 DIAGNOSIS — E78.2 MIXED HYPERLIPIDEMIA: ICD-10-CM

## 2024-05-02 DIAGNOSIS — R51.9 CHRONIC INTRACTABLE HEADACHE, UNSPECIFIED HEADACHE TYPE: ICD-10-CM

## 2024-05-02 DIAGNOSIS — K21.9 GASTROESOPHAGEAL REFLUX DISEASE, UNSPECIFIED WHETHER ESOPHAGITIS PRESENT: Primary | ICD-10-CM

## 2024-05-02 DIAGNOSIS — E66.9 TYPE 2 DIABETES MELLITUS WITH OBESITY (MULTI): ICD-10-CM

## 2024-05-02 PROBLEM — E66.01 MORBID OBESITY, UNSPECIFIED OBESITY TYPE (MULTI): Status: ACTIVE | Noted: 2024-04-11

## 2024-05-02 PROBLEM — Z98.890 HISTORY OF RIGHT BREAST BIOPSY: Status: ACTIVE | Noted: 2024-03-29

## 2024-05-02 PROCEDURE — 3074F SYST BP LT 130 MM HG: CPT | Performed by: INTERNAL MEDICINE

## 2024-05-02 PROCEDURE — 99215 OFFICE O/P EST HI 40 MIN: CPT | Performed by: INTERNAL MEDICINE

## 2024-05-02 PROCEDURE — 3078F DIAST BP <80 MM HG: CPT | Performed by: INTERNAL MEDICINE

## 2024-05-02 PROCEDURE — 1036F TOBACCO NON-USER: CPT | Performed by: INTERNAL MEDICINE

## 2024-05-02 PROCEDURE — 4010F ACE/ARB THERAPY RXD/TAKEN: CPT | Performed by: INTERNAL MEDICINE

## 2024-05-02 RX ORDER — ATORVASTATIN CALCIUM 40 MG/1
40 TABLET, FILM COATED ORAL DAILY
Qty: 90 TABLET | Refills: 1 | Status: SHIPPED | OUTPATIENT
Start: 2024-05-02 | End: 2024-10-29

## 2024-05-02 RX ORDER — BLOOD-GLUCOSE SENSOR
EACH MISCELLANEOUS
COMMUNITY
Start: 2024-04-12

## 2024-05-02 RX ORDER — AMLODIPINE BESYLATE 5 MG/1
5 TABLET ORAL DAILY
Qty: 90 TABLET | Refills: 1 | Status: SHIPPED | OUTPATIENT
Start: 2024-05-02

## 2024-05-02 RX ORDER — OMEPRAZOLE 40 MG/1
40 CAPSULE, DELAYED RELEASE ORAL
Qty: 90 CAPSULE | Refills: 1 | Status: SHIPPED | OUTPATIENT
Start: 2024-05-02

## 2024-05-02 RX ORDER — LOSARTAN POTASSIUM 100 MG/1
100 TABLET ORAL DAILY
Qty: 90 TABLET | Refills: 1 | Status: SHIPPED | OUTPATIENT
Start: 2024-05-02

## 2024-05-02 SDOH — ECONOMIC STABILITY: FOOD INSECURITY: WITHIN THE PAST 12 MONTHS, THE FOOD YOU BOUGHT JUST DIDN'T LAST AND YOU DIDN'T HAVE MONEY TO GET MORE.: NEVER TRUE

## 2024-05-02 SDOH — ECONOMIC STABILITY: FOOD INSECURITY: WITHIN THE PAST 12 MONTHS, YOU WORRIED THAT YOUR FOOD WOULD RUN OUT BEFORE YOU GOT MONEY TO BUY MORE.: NEVER TRUE

## 2024-05-02 ASSESSMENT — PATIENT HEALTH QUESTIONNAIRE - PHQ9
1. LITTLE INTEREST OR PLEASURE IN DOING THINGS: NOT AT ALL
2. FEELING DOWN, DEPRESSED OR HOPELESS: NOT AT ALL
SUM OF ALL RESPONSES TO PHQ9 QUESTIONS 1 & 2: 0

## 2024-05-02 ASSESSMENT — LIFESTYLE VARIABLES
SKIP TO QUESTIONS 9-10: 1
HOW OFTEN DO YOU HAVE A DRINK CONTAINING ALCOHOL: NEVER
HOW MANY STANDARD DRINKS CONTAINING ALCOHOL DO YOU HAVE ON A TYPICAL DAY: PATIENT DOES NOT DRINK
AUDIT-C TOTAL SCORE: 0
HOW OFTEN DO YOU HAVE SIX OR MORE DRINKS ON ONE OCCASION: NEVER

## 2024-05-02 NOTE — PROGRESS NOTES
"Chief Complaint/HPI:  stressors at home: home issues have improved, she has been stressed, she is not depressed now, daughter is moving out home soon, patient thinks     Anxiety/ stressors: patient takes Vistaril for anxiety, she  feels better now, she  takes bupropion 150 mg now, and fluoxetine      HA: HA is  occurring a few times per week I have personally reviewed the OARRS report. This report is scanned into the electronic medical record. I have considered the risks of abuse, dependence, addiction and diversion. I believe that it is clinically appropriate to prescribe this medication. Patient uses Norco as needed. She also uses Toradol IM, Phenergan and dexamethasone, Nyrtec does not seen to be effective now. She uses Toradol up to twice weekly. Patient wonders about Ubrelvy, it does not appear to be covered by insurance     Obesity: patient went to [facility] bariatrics in [place]. Patient did initially lose weight, she has had stressors due to issues with daughter recently, patient prefers medical management to surgical management of obesity, patient has now stopped going to bariatrics, she did start Mounjaro, it does help with weight loss, she has lost about 10 #.  She is scheduled to see [facility] for assessment. Patient still takes aldactone, she has noted increased swelling recently of the legs     reactive airways: patient sees allergist, Dr Adame     back pain: still has some back pain      \"central sleep apnea\": patient had sleep study per cardiology and pulmonology, she now uses CPAP now, has OA in the upper back.     DM type 2: Glucoses have been better, Hgb A1C is 7.3, patient now uses Basiglar . She does use SSI, Farxiga, Mounjaro has been effective, the Mounjaro is not available now, patient  has discussed option with clinical pharmacy, she will be started on Ozempic for now. She will be started on 0.5 mg dose initially.      breast lesion: patient still sees breast specialist at [facility], this is " Foot Pain: Care Instructions Your Care Instructions Foot injuries that cause pain and swelling are fairly common. Almost all sports or home repair projects can cause a misstep that ends up as foot pain. Normal wear and tear, especially as you get older, also can cause foot pain. Most minor foot injuries will heal on their own, and home treatment is usually all you need to do. If you have a severe injury, you may need tests and treatment. Follow-up care is a key part of your treatment and safety. Be sure to make and go to all appointments, and call your doctor if you are having problems. It's also a good idea to know your test results and keep a list of the medicines you take. How can you care for yourself at home? · Take pain medicines exactly as directed. ? If the doctor gave you a prescription medicine for pain, take it as prescribed. ? If you are not taking a prescription pain medicine, ask your doctor if you can take an over-the-counter medicine. · Rest and protect your foot. Take a break from any activity that may cause pain. · Put ice or a cold pack on your foot for 10 to 20 minutes at a time. Put a thin cloth between the ice and your skin. · Prop up the sore foot on a pillow when you ice it or anytime you sit or lie down during the next 3 days. Try to keep it above the level of your heart. This will help reduce swelling. · Your doctor may recommend that you wrap your foot with an elastic bandage. Keep your foot wrapped for as long as your doctor advises. · If your doctor recommends crutches, use them as directed. · Wear roomy footwear. · As soon as pain and swelling end, begin gentle exercises of your foot. Your doctor can tell you which exercises will help. When should you call for help? Call 911 anytime you think you may need emergency care. For example, call if: 
  · Your foot turns pale, white, blue, or cold.  
 Call your doctor now or seek immediate medical care if: not changed     hypothyroidism : patient takes levothyroxine 88 mcg daily          ROS otherwise negative aside from what was mentioned above in HPI.      Patient Active Problem List   Diagnosis    Abdominal pain, acute, right upper quadrant    Acute viral syndrome    Allergies    Anxiety and depression    Atypical chest pain    Breast mass, right    Bronchiectasis (Multi)    Chest heaviness    Chronic headache    Cramp in muscle    Diverticulosis of large intestine    Type 2 diabetes mellitus with obesity (Multi)    Eruption due to drug    Essential hypertension    Frequent urination    Gastroenteritis, acute    Headache, variant migraine    Hematoma of right lower extremity    Hypothyroidism    Metabolic syndrome X    Migraines    Nausea in adult    Neuropathy    Multiple lung nodules on CT    Obesity with body mass index (BMI) of 30.0 to 39.9    Occupational asthma ([facility])    Mixed hyperlipidemia    Palpitations    Postmenopausal bone loss    Productive cough    Tonsil stone    Tonsillitis    Vitamin D insufficiency    Anxiety    Atherosclerosis of coronary artery    Chronic nonalcoholic liver disease    COPD (chronic obstructive pulmonary disease) (Multi)    Daytime sleepiness    Diabetes mellitus (Multi)    Diverticulitis    Fibrocystic breast changes of both breasts    GERD (gastroesophageal reflux disease)    History of depression    History of hypertension    Mastodynia of right breast    PLATA (dyspnea on exertion)    Shortness of breath at rest    Asthma ([facility])    Sleep apnea    Solitary cyst of left breast    Hematoma of lower extremity    Vitamin D deficiency    Hypercholesteremia    Mild chronic obstructive pulmonary disease (Multi)    Malaise and fatigue    History of right breast biopsy    Morbid obesity, unspecified obesity type (Multi)         Past Medical History:   Diagnosis Date    ADHD (attention deficit hyperactivity disorder)     Anxiety     Asthma ([facility])     Cataract     COPD (chronic    · You cannot move or stand on your foot.  
  · Your foot looks twisted or out of its normal position.  
  · Your foot is not stable when you step down.  
  · You have signs of infection, such as: 
? Increased pain, swelling, warmth, or redness. ? Red streaks leading from the sore area. ? Pus draining from a place on your foot. ? A fever.  
  · Your foot is numb or tingly.  
 Watch closely for changes in your health, and be sure to contact your doctor if: 
  · You do not get better as expected.  
  · You have bruises from an injury that last longer than 2 weeks. Where can you learn more? Go to http://sumit-uli.info/. Enter N663 in the search box to learn more about \"Foot Pain: Care Instructions. \" Current as of: June 26, 2019 Content Version: 12.2 © 6974-7503 [employer]. Care instructions adapted under license by [employer] (which disclaims liability or warranty for this information). If you have questions about a medical condition or this instruction, always ask your healthcare professional. [address] any warranty or liability for your use of this information. "obstructive pulmonary disease) (Multi)     Depression     Diabetes mellitus (Multi)     Disease of thyroid gland     Eating disorder     Eczema     GERD (gastroesophageal reflux disease)     Headache     Hypertension     Personal history of other diseases of the circulatory system     History of hypertension    Personal history of other diseases of the female genital tract     History of polycystic ovarian syndrome    Personal history of other diseases of the respiratory system     History of asthma    Personal history of other mental and behavioral disorders     History of depression     Past Surgical History:   Procedure Laterality Date    BREAST SURGERY  2005    CARDIAC CATHETERIZATION  2004    EYE SURGERY      HERNIA REPAIR  1965,2002    HYSTERECTOMY  2000    OTHER SURGICAL HISTORY  10/25/2019    Hysterectomy    OTHER SURGICAL HISTORY  10/25/2019    Turbinectomy    SINUS SURGERY      WISDOM TOOTH EXTRACTION  1981     Social History     Social History Narrative    Not on file         ALLERGIES  Azithromycin, Erythromycin, Fentanyl, Icosapent ethyl, Levofloxacin, Metronidazole, Ondansetron hcl, Penicillins, Erythromycin base, Iodine, and Lisinopril      MEDICATIONS  Current Outpatient Medications on File Prior to Visit   Medication Sig Dispense Refill    albuterol 2.5 mg /3 mL (0.083 %) nebulizer solution Take 3 mL (2.5 mg) by nebulization 4 times a day as needed for wheezing or shortness of breath. 360 mL 11    aspirin 81 mg EC tablet Take by mouth.      atenolol (Tenormin) 50 mg tablet Take 1 tablet (50 mg) by mouth 2 times a day. 180 tablet 1    b complex vitamins capsule Take 1 capsule by mouth 2 times a day.      Basaglar KwikPen U-100 Insulin 100 unit/mL (3 mL) pen Inject 15 Units under the skin once daily at bedtime.      BD Integra Syringe 3 mL 25 gauge x 1\" syringe use as directed for TORADOL INJECTION intramuscularly if needed      buPROPion XL (Wellbutrin XL) 150 mg 24 hr tablet Take 1 tablet (150 mg) " by mouth once daily. 90 tablet 1    cholecalciferol (Vitamin D-3) 50 mcg (2,000 unit) capsule Take 1 capsule (50 mcg) by mouth once daily.      dapagliflozin propanediol (Farxiga) 10 mg Take 1 tablet (10 mg) by mouth once daily in the morning. Take before meals. 90 tablet 1    dexAMETHasone (Decadron) 0.75 mg tablet Take 2 tablets (1.5 mg) by mouth 2 times a day with meals. 360 tablet 3    fenofibrate (Triglide) 160 mg tablet Take 1 tablet (160 mg) by mouth once daily. 90 tablet 1    FLUoxetine (PROzac) 20 mg capsule Take 1 capsule (20 mg) by mouth once daily. 90 capsule 1    fluticasone furoate-vilanteroL (Breo Elipta) 200-25 mcg/dose inhaler Inhale 1 puff once daily.      FreeStyle Davey 3 Sensor device use as directed AND CHANGE EVERY 14 DAYS      HYDROcodone-acetaminophen (Norco) 5-325 mg tablet Take 1 tablet by mouth 2 times a day as needed for severe pain (7 - 10). 20 tablet 0    hydrOXYzine pamoate (VistariL) 25 mg capsule Take 1 capsule (25 mg) by mouth 3 times a day as needed for anxiety. 90 capsule 3    insulin glargine-yfgn 100 unit/mL (3 mL) Pen Inject 15 Units under the skin once daily. Take as directed per insulin instructions. 15 mL 3    ketorolac 30 mg/mL (1 mL) injection inject 1 milliliter intramuscularly every 6 hours if needed for migraines      levothyroxine (Synthroid, Levoxyl) 88 mcg tablet Take 1 tablet (88 mcg) by mouth once daily. 90 tablet 1    magnesium oxide 500 mg tablet Take 1 tablet (500 mg) by mouth 2 times a day.      metFORMIN  mg 24 hr tablet Take 2 tablets (1,000 mg) by mouth 2 times a day. 360 tablet 1    naloxone (Narcan) 4 mg/0.1 mL nasal spray Administer into affected nostril(s).      NovoLOG Flexpen U-100 Insulin 100 unit/mL (3 mL) pen Inject 5 Units under the skin see administration instructions. Inject 5 units under the skin with meals if glucose is over 200. 15 mL 3    OneTouch Ultra Test strip OneTouch Ultra Blue STRP   Refills: 0        Start : 18-Dec-2018  "  Active      pen needle, diabetic (Droplet Pen Needle) 32 gauge x 5/32\" needle Inject 1 Pen needle under the skin if needed (with insulin injections). 200 each 5    promethazine (Phenergan) 25 mg tablet Take 1 tablet (25 mg) by mouth 3 times a day as needed for nausea or vomiting. 30 tablet 3    Qvar RediHaler 80 mcg/actuation inhaler Inhale 2 Inhalations 2 times a day.      rimegepant (Nurtec ODT) 75 mg tablet,disintegrating Take 1 tablet (75 mg) by mouth every other day. Send to [facility] [phone] 16 tablet 1    semaglutide (Ozempic) 0.25 mg or 0.5 mg (2 mg/3 mL) pen injector Inject 0.5 mg under the skin every 7 days. 3 mL 0    sodium fluoride-pot nitrate 1.1-5 % paste BRUSH TWICE A DAY FOR 2 MINUTES AND DO NTO EAT OR DRINK FOR 1 HOUR AFTER BRUSHING      spironolactone (Aldactone) 25 mg tablet Take 0.5 tablets (12.5 mg) by mouth once daily. 45 tablet 1    tiotropium (Spiriva Respimat) 2.5 mcg/actuation inhaler Inhale 2 puffs once daily.      [DISCONTINUED] amLODIPine (Norvasc) 5 mg tablet Take 1 tablet (5 mg) by mouth once daily. as directed 90 tablet 1    [DISCONTINUED] losartan (Cozaar) 100 mg tablet Take 1 tablet (100 mg) by mouth once daily. 90 tablet 1    [DISCONTINUED] omeprazole (PriLOSEC) 40 mg DR capsule Take 1 capsule (40 mg) by mouth once daily in the morning. Take before meals.      [DISCONTINUED] atorvastatin (Lipitor) 40 mg tablet Take 1 tablet (40 mg) by mouth once daily. as directed 90 tablet 1    [DISCONTINUED] methylPREDNISolone (Medrol Dospak) 4 mg tablets use as directed FOLLOW DIRECTIONS ON BACK OF FOIL PACK      [DISCONTINUED] tirzepatide (Mounjaro) 5 mg/0.5 mL pen injector Inject 5 mg under the skin 1 (one) time per week. 2 mL 1     No current facility-administered medications on file prior to visit.         PHYSICAL EXAM  /77 (BP Location: Right arm, Patient Position: Sitting, BP Cuff Size: Large adult)   Pulse 78   Temp 36.3 °C (97.4 °F)   " "Resp 16   Ht 1.619 m (5' 3.75\")   Wt 100 kg (220 lb 12.8 oz)   SpO2 95%   BMI 38.20 kg/m²   Body mass index is 38.2 kg/m².  Constitutional   General appearance:  well developed, appears healthy, well nourished, morbidly obese . pleasant female, NAD.     Eyes   Inspection of eyes: Sclera and conjunctiva were normal.  Wears glasses  Ears, Nose, Mouth, and Throat   Ears: Auricles: Normal.  Neck is supple, no bruits , no masses, no thyromegaly is appreciated  Pulmonary   Respiratory assessment: No respiratory distress, normal respiratory rhythm and effort.    Auscultation of Lungs:  no wheezes are noted, no rales or crackles were heard bilaterally. no rhonchi. no friction rub. no diminished breath sounds  Cardiovascular   Auscultation of heart: Apical pulse normal, heart rate and rhythm normal, normal S1 and S2, no murmurs and no pericardial rub.    Exam for edema: trace bilateral pitting of ankles , feet and anterior legs, up to mid calf bilaterally.   Lymphatic   Palpation of lymph nodes in neck: No cervical lymphadenopathy.   Neurologic   Cranial nerves: Nerves 2-12 were intact, no focal neuro defects.    Psychiatric   Orientation: Oriented to person, place, and time.    Mood and affect: Normal.      ASSESSMENT/PLAN  Problem List Items Addressed This Visit       Chronic headache    Current Assessment & Plan     Patient has tried multiple meds, she would like to try Ubrelvy, it does not appear to be covered by insurance. Refer to clinical pharmacy to review options         Type 2 diabetes mellitus with obesity (Multi)    Current Assessment & Plan     HgbA1C improved on Mounjaro, she will start Ozempic due to poor availability of Mounjaro. Will see if this is tolerable and effective          Relevant Orders    Transthoracic Echo Complete    Essential hypertension    Current Assessment & Plan     Controlled on current regimen, no changes         Relevant Medications    losartan (Cozaar) 100 mg tablet    amLODIPine " (Norvasc) 5 mg tablet    Other Relevant Orders    Transthoracic Echo Complete    Mixed hyperlipidemia    Current Assessment & Plan     No med changes, check labs         Relevant Medications    atorvastatin (Lipitor) 40 mg tablet    GERD (gastroesophageal reflux disease) - Primary    Relevant Medications    omeprazole (PriLOSEC) 40 mg DR capsule     Other Visit Diagnoses       Need for shingles vaccine        Relevant Medications    zoster vaccine-recombinant adjuvanted (Shingrix) 50 mcg/0.5 mL vaccine    Medication management        Relevant Orders    Referral to Clinical Pharmacy    Localized swelling of both lower legs        Relevant Orders    Transthoracic Echo Complete          Patient is going to Bariatrics now , she plans to have gastric sleeve completed at [facility]  Follow up in 3 months       Kimani Li MD

## 2024-05-02 NOTE — ASSESSMENT & PLAN NOTE
HgbA1C improved on Mounjaro, she will start Ozempic due to poor availability of Mounjaro. Will see if this is tolerable and effective

## 2024-05-09 DIAGNOSIS — R11.0 NAUSEA IN ADULT: ICD-10-CM

## 2024-05-09 DIAGNOSIS — G43.711 INTRACTABLE CHRONIC MIGRAINE WITHOUT AURA AND WITH STATUS MIGRAINOSUS: ICD-10-CM

## 2024-05-09 RX ORDER — PROMETHAZINE HYDROCHLORIDE 25 MG/1
25 TABLET ORAL 3 TIMES DAILY PRN
Qty: 30 TABLET | Refills: 3 | Status: SHIPPED | OUTPATIENT
Start: 2024-05-09

## 2024-05-09 NOTE — TELEPHONE ENCOUNTER
Pt called in and stated that her Nurtex, Toradol, and Promethazine were not refilled during last office visit. Pt is requesting this be refilled.     Pt's pharmacy is [facility]

## 2024-05-09 NOTE — TELEPHONE ENCOUNTER
Pt called in and stated that her Nurtex, Toradol, and Promethazine were not refilled during last office visit. Pt is requesting this be refilled.      Pt's pharmacy is [facility] [place]          Lov: 5/9/24  Nov: 7/18/24    Nurtec and promethazine pended. I do not see toradol presribed by Dr. MELO in the past. Please advise.

## 2024-05-10 ENCOUNTER — TELEPHONE (OUTPATIENT)
Dept: PRIMARY CARE | Facility: CLINIC | Age: 59
End: 2024-05-10
Payer: COMMERCIAL

## 2024-05-10 DIAGNOSIS — G43.719 INTRACTABLE CHRONIC MIGRAINE WITHOUT AURA AND WITHOUT STATUS MIGRAINOSUS: ICD-10-CM

## 2024-05-10 DIAGNOSIS — G43.809 HEADACHE, VARIANT MIGRAINE: Primary | ICD-10-CM

## 2024-05-10 RX ORDER — HYDROCODONE BITARTRATE AND ACETAMINOPHEN 5; 325 MG/1; MG/1
1 TABLET ORAL 2 TIMES DAILY PRN
Qty: 20 TABLET | Refills: 0 | Status: SHIPPED | OUTPATIENT
Start: 2024-05-10 | End: 2024-05-30 | Stop reason: SDUPTHER

## 2024-05-10 RX ORDER — KETOROLAC TROMETHAMINE 30 MG/ML
INJECTION, SOLUTION INTRAMUSCULAR; INTRAVENOUS
Qty: 10 ML | Refills: 2 | Status: SHIPPED | OUTPATIENT
Start: 2024-05-10

## 2024-05-10 NOTE — TELEPHONE ENCOUNTER
Pt called in requesting a refill of [place] and IM Toradol. Pt states she has been out of the IM tramadol and is now having to use the Norco as she has headaches, please advise.     Pt uses [facility] [place]

## 2024-05-21 ENCOUNTER — TELEMEDICINE (OUTPATIENT)
Dept: PHARMACY | Facility: HOSPITAL | Age: 59
End: 2024-05-21
Payer: COMMERCIAL

## 2024-05-21 DIAGNOSIS — E11.69 TYPE 2 DIABETES MELLITUS WITH OBESITY (MULTI): ICD-10-CM

## 2024-05-21 DIAGNOSIS — E66.9 TYPE 2 DIABETES MELLITUS WITH OBESITY (MULTI): ICD-10-CM

## 2024-05-21 DIAGNOSIS — G43.719 INTRACTABLE CHRONIC MIGRAINE WITHOUT AURA AND WITHOUT STATUS MIGRAINOSUS: Primary | ICD-10-CM

## 2024-05-21 PROCEDURE — RXMED WILLOW AMBULATORY MEDICATION CHARGE

## 2024-05-21 RX ORDER — SEMAGLUTIDE 1.34 MG/ML
1 INJECTION, SOLUTION SUBCUTANEOUS
Qty: 3 ML | Refills: 0 | Status: SHIPPED | OUTPATIENT
Start: 2024-05-21

## 2024-05-21 RX ORDER — ATOGEPANT 60 MG/1
60 TABLET ORAL DAILY
Qty: 30 TABLET | Refills: 5 | Status: SHIPPED | OUTPATIENT
Start: 2024-05-21

## 2024-05-21 RX ORDER — ATOGEPANT 60 MG/1
60 TABLET ORAL DAILY
Qty: 30 TABLET | Refills: 5 | Status: SHIPPED | OUTPATIENT
Start: 2024-05-21 | End: 2024-05-21 | Stop reason: ENTERED-IN-ERROR

## 2024-05-21 ASSESSMENT — ENCOUNTER SYMPTOMS: DIABETIC ASSOCIATED SYMPTOMS: 0

## 2024-05-21 NOTE — PROGRESS NOTES
Pharmacist Clinic: Diabetes Management  Mary Jean Baptiste is a 58 y.o. female was referred to Clinical Pharmacy Team for diabetes management.   Referring Provider: Kimani Li MD   - Last visit 5/2/24    Subjective   Diabetes  She presents for her initial diabetic visit. She has type 2 diabetes mellitus. There are no hypoglycemic associated symptoms. There are no diabetic associated symptoms. There are no hypoglycemic complications. There are no diabetic complications. Current diabetic treatment includes oral agent (triple therapy) and insulin injections.     HPI    DIABETES     Current diet:   - Significant reduction in cravings/appetite; it wears off at end of week for each injection  - Overall tries to watch carb/starch intake    Weight loss:  - 11 lbs    Current monitoring regimen:   Patient is using: glucometer; also has freestyle vika 3 now    Reported blood sugars:   Avg = 123  Highest <200, but spikes close to that after some meals    Any episodes of hypoglycemia? none    Adverse Effects:   A little nausea with this dose, but tolerable    MIGRAINES    - Interested in Ubrelvy; non formulary - Qulipta/Nurtec preferred  - Previously saw neurology but hasn't seen in a couple years  - Discussed injectable biologics in the past but neuro wanted to hold off; can't recall why    Frequency:  - A couple times per week  - Currently having low grade headache consistently    Current pharmacotherapy:  - None; just pain medication + compazine     Historical pharmacotherapy:  - Nurtec ODT 75 mg every other day = did not work; tried 12/7/23-3/7/24  - Sumatriptan  - Topamax 300 mg twice daily  - Propranolol   - Botox    Allergies   Allergen Reactions    Azithromycin Other    Erythromycin Unknown    Fentanyl Unknown and Other     Other reaction(s): doesnt work, Other (See Comments), Unknown   NON THERAPUTIC    NON THERAPUTIC    Icosapent Ethyl Unknown    Levofloxacin Unknown    Metronidazole Unknown    Ondansetron  "Hcl Unknown    Penicillins Unknown    Erythromycin Base Rash    Iodine Rash and Unknown     Other reaction(s): Other (See Comments), Unknown   Chemical peritonitis    Chemical peritonitis    Lisinopril Rash       Objective     There were no vitals taken for this visit.    Diabetes Pharmacotherapy:    Ozempic 1 mg weekly (Saturdays)  Basaglar 15 units daily  Novolog SS  Farxiga 10 mg daily  Metformin 1000 mg twice daily    Historical Pharmacotherapy  Victoza 1.2 mg daily    SECONDARY PREVENTION  - Statin? Yes   - ACE-I/ARB? Yes  - Aspirin? Yes    Pertinent PMH Review:  - PMH of Pancreatitis: No  - PMH of Retinopathy: No  - PMH of Urinary Tract Infections: No  - PMH of MTC: No    Lab Review  Lab Results   Component Value Date    BILITOT 0.4 02/12/2021    CALCIUM 9.4 02/12/2021    CO2 26 02/12/2021     02/12/2021    CREATININE 0.80 02/12/2021    GLUCOSE 134 (H) 02/12/2021    ALKPHOS 59 02/12/2021    K 4.2 02/12/2021    PROT 6.8 02/12/2021     02/12/2021    AST 14 02/12/2021    ALT 21 02/12/2021    BUN 20 02/12/2021    ANIONGAP 11 02/12/2021    ALBUMIN 4.3 02/12/2021    AMYLASE 32 02/12/2021    LIPASE 43 02/12/2021     No results found for: \"TRIG\", \"CHOL\", \"LDL\", \"HDL\"  Lab Results   Component Value Date    HGBA1C 6.9 (H) 04/30/2024    HGBA1C 7.3 (A) 04/11/2024    HGBA1C 7.3 (H) 10/26/2023     The ASCVD Risk score (Esperanza DK, et al., 2019) failed to calculate for the following reasons:    Cannot find a previous HDL lab    Cannot find a previous total cholesterol lab    Drug Interactions:  None    Assessment/Plan   Problem List Items Addressed This Visit       Type 2 diabetes mellitus with obesity (Multi)     ASSESSMENT:  Patient's diabetes is well controlled with most recent A1c of 6.9% (Goal <7%). Doing well with Ozempic (switched from Mounjaro d/t availability issues) and has lost 11 lbs so far!    PLAN:  INCREASE Ozempic to 1 mg weekly  Continue all other pharmacotherapy. Will consider lowering insulin " depending on reported blood sugars at next follow up.  Continue testing blood sugars with freestyle vika 3         Relevant Medications    semaglutide (Ozempic) 1 mg/dose (4 mg/3 mL) pen injector    Other Relevant Orders    Follow Up In Clinical Pharmacy    Migraines - Primary     ASSESSMENT:  Patient with a history of chronic migraines (>15/month) who has tried many different preventative/abortive therapies in the past without much improvement (see HPI for past therapies). She last saw her neurologist 2 years ago. Recalls discussing biologics in the past but can't remember why they didn't start one. She last tried Nurtec and it did not help. Primarily managing with pain + nausea medications currently. Interested in Ubrelvy, but it's non-formulary. Qulipta is covered with coupon for $0/month.    PLAN:  Start Qulipta 60 mg daily.   Discussed MOA, ADR's, administration, etc. Answered all patient's questions.         Relevant Medications    atogepant (Qulipta) 60 mg tablet tablet    Other Relevant Orders    Follow Up In Clinical Pharmacy     Follow up: 6/18 10am    Kelsea Kaur, PharmD, [facility]  Clinical Pharmacist     Continue all meds under the continuation of care with the referring provider and clinical pharmacy team.

## 2024-05-21 NOTE — ASSESSMENT & PLAN NOTE
ASSESSMENT:  Patient's diabetes is well controlled with most recent A1c of 6.9% (Goal <7%). Doing well with Ozempic (switched from Mounjaro d/t availability issues) and has lost 11 lbs so far!    PLAN:  INCREASE Ozempic to 1 mg weekly  Continue all other pharmacotherapy. Will consider lowering insulin depending on reported blood sugars at next follow up.  Continue testing blood sugars with freestyle vika 3

## 2024-05-21 NOTE — ASSESSMENT & PLAN NOTE
ASSESSMENT:  Patient with a history of chronic migraines (>15/month) who has tried many different preventative/abortive therapies in the past without much improvement (see HPI for past therapies). She last saw her neurologist 2 years ago. Recalls discussing biologics in the past but can't remember why they didn't start one. She last tried Nurtec and it did not help. Primarily managing with pain + nausea medications currently. Interested in Ubrelvy, but it's non-formulary. Qulipta is covered with coupon for $0/month.    PLAN:  Start Qulipta 60 mg daily.   Discussed MOA, ADR's, administration, etc. Answered all patient's questions.

## 2024-05-22 ENCOUNTER — PHARMACY VISIT (OUTPATIENT)
Dept: PHARMACY | Facility: CLINIC | Age: 59
End: 2024-05-22
Payer: MEDICARE

## 2024-05-25 DIAGNOSIS — I10 ESSENTIAL HYPERTENSION: ICD-10-CM

## 2024-05-28 ENCOUNTER — APPOINTMENT (OUTPATIENT)
Dept: CARDIOLOGY | Facility: HOSPITAL | Age: 59
End: 2024-05-28
Payer: COMMERCIAL

## 2024-05-28 RX ORDER — SPIRONOLACTONE 25 MG/1
TABLET ORAL DAILY
Qty: 45 TABLET | Refills: 1 | Status: SHIPPED | OUTPATIENT
Start: 2024-05-28

## 2024-05-30 ENCOUNTER — HOSPITAL ENCOUNTER (OUTPATIENT)
Dept: CARDIOLOGY | Facility: HOSPITAL | Age: 59
Discharge: HOME | End: 2024-05-30
Payer: COMMERCIAL

## 2024-05-30 ENCOUNTER — TELEPHONE (OUTPATIENT)
Dept: PRIMARY CARE | Facility: CLINIC | Age: 59
End: 2024-05-30
Payer: COMMERCIAL

## 2024-05-30 DIAGNOSIS — R60.0 LOCALIZED EDEMA: ICD-10-CM

## 2024-05-30 DIAGNOSIS — E11.69 TYPE 2 DIABETES MELLITUS WITH OBESITY (MULTI): ICD-10-CM

## 2024-05-30 DIAGNOSIS — R22.43 LOCALIZED SWELLING OF BOTH LOWER LEGS: ICD-10-CM

## 2024-05-30 DIAGNOSIS — G43.719 INTRACTABLE CHRONIC MIGRAINE WITHOUT AURA AND WITHOUT STATUS MIGRAINOSUS: ICD-10-CM

## 2024-05-30 DIAGNOSIS — E66.9 TYPE 2 DIABETES MELLITUS WITH OBESITY (MULTI): ICD-10-CM

## 2024-05-30 DIAGNOSIS — I10 ESSENTIAL HYPERTENSION: ICD-10-CM

## 2024-05-30 LAB
AORTIC VALVE MEAN GRADIENT: 7 MMHG
AORTIC VALVE PEAK VELOCITY: 1.75 M/S
AV PEAK GRADIENT: 12.3 MMHG
AVA (PEAK VEL): 2.14 CM2
AVA (VTI): 2.04 CM2
EJECTION FRACTION APICAL 4 CHAMBER: 67.8
LEFT ATRIUM VOLUME AREA LENGTH INDEX BSA: 21.6 ML/M2
LEFT VENTRICLE INTERNAL DIMENSION DIASTOLE: 3.8 CM (ref 3.5–6)
LEFT VENTRICULAR OUTFLOW TRACT DIAMETER: 1.9 CM
LV EJECTION FRACTION BIPLANE: 62 %
MITRAL VALVE E/A RATIO: 1.19
MITRAL VALVE E/E' RATIO: 9.38
RIGHT VENTRICLE FREE WALL PEAK S': 10 CM/S
RIGHT VENTRICLE PEAK SYSTOLIC PRESSURE: 20.3 MMHG
TRICUSPID ANNULAR PLANE SYSTOLIC EXCURSION: 2.1 CM

## 2024-05-30 PROCEDURE — 93306 TTE W/DOPPLER COMPLETE: CPT | Performed by: INTERNAL MEDICINE

## 2024-05-30 PROCEDURE — 93306 TTE W/DOPPLER COMPLETE: CPT

## 2024-05-30 RX ORDER — HYDROCODONE BITARTRATE AND ACETAMINOPHEN 5; 325 MG/1; MG/1
1 TABLET ORAL 2 TIMES DAILY PRN
Qty: 20 TABLET | Refills: 0 | Status: SHIPPED | OUTPATIENT
Start: 2024-05-30

## 2024-05-30 NOTE — TELEPHONE ENCOUNTER
Pt is requesting hydrocodone to [facility] in [place].

## 2024-06-18 ENCOUNTER — APPOINTMENT (OUTPATIENT)
Dept: PHARMACY | Facility: HOSPITAL | Age: 59
End: 2024-06-18
Payer: COMMERCIAL

## 2024-06-18 ENCOUNTER — TELEPHONE (OUTPATIENT)
Dept: PRIMARY CARE | Facility: CLINIC | Age: 59
End: 2024-06-18

## 2024-06-18 DIAGNOSIS — G43.719 INTRACTABLE CHRONIC MIGRAINE WITHOUT AURA AND WITHOUT STATUS MIGRAINOSUS: ICD-10-CM

## 2024-06-18 DIAGNOSIS — E66.9 TYPE 2 DIABETES MELLITUS WITH OBESITY (MULTI): ICD-10-CM

## 2024-06-18 DIAGNOSIS — E11.69 TYPE 2 DIABETES MELLITUS WITH OBESITY (MULTI): ICD-10-CM

## 2024-06-18 PROCEDURE — RXMED WILLOW AMBULATORY MEDICATION CHARGE

## 2024-06-18 RX ORDER — TIRZEPATIDE 5 MG/.5ML
5 INJECTION, SOLUTION SUBCUTANEOUS
Qty: 2 ML | Refills: 1 | Status: SHIPPED | OUTPATIENT
Start: 2024-06-18

## 2024-06-18 RX ORDER — HYDROCODONE BITARTRATE AND ACETAMINOPHEN 5; 325 MG/1; MG/1
1 TABLET ORAL 2 TIMES DAILY PRN
Qty: 20 TABLET | Refills: 0 | Status: SHIPPED | OUTPATIENT
Start: 2024-06-18

## 2024-06-18 ASSESSMENT — ENCOUNTER SYMPTOMS: DIABETIC ASSOCIATED SYMPTOMS: 0

## 2024-06-18 NOTE — ASSESSMENT & PLAN NOTE
ASSESSMENT:  Patient's diabetes is well controlled with most recent A1c of 6.9% (Goal <7%). Now that Mounjaro is back in stock, patient requests to go back to this. Sending Rx for 5 mg.     PLAN:  CHANGE Ozempic to Mounjaro 5 mg weekly  Continue all other pharmacotherapy. Will consider lowering insulin depending on reported blood sugars at next follow up.  Continue testing blood sugars with freestyle vika 3 - sent invite to connect via [employer]

## 2024-06-18 NOTE — PROGRESS NOTES
Pharmacist Clinic: Diabetes Management  Mary Jean Baptiste is a 58 y.o. female was referred to Clinical Pharmacy Team for diabetes management.   Referring Provider: Kimani Li MD   - Last visit 5/2/24    Subjective   Diabetes  She presents for her initial diabetic visit. She has type 2 diabetes mellitus. There are no hypoglycemic associated symptoms. There are no diabetic associated symptoms. There are no hypoglycemic complications. There are no diabetic complications. Current diabetic treatment includes oral agent (triple therapy) and insulin injections.     HPI    DIABETES     - Doesn't like Ozempic as much as Mounjaro    Current diet:   - Significant reduction in cravings/appetite; it wears off at end of week for each injection  - Overall tries to watch carb/starch intake    Weight loss:  - 11 lbs    Current monitoring regimen:   Patient is using: glucometer; also has freestyle vika 3 now    Reported blood sugars:   7 day avg glucose = 123  Highest <200, but spikes close to that after some meals    Any episodes of hypoglycemia? none    Adverse Effects:   A little nausea if she eats too much    MIGRAINES    - Interested in Ubrelvy; non formulary - Qulipta/Nurtec preferred  - Previously saw neurology but hasn't seen in a couple years  - Discussed injectable biologics in the past but neuro wanted to hold off; can't recall why  - Tried Qulipta after last visit and didn't like how she felt on it    Frequency:  - A couple times per week  - Currently having low grade headache consistently    Current pharmacotherapy:  - None; just pain medication + compazine     Historical pharmacotherapy:  - Qulipta - made her feel tired and had a low grade headache all the time; since stopping she feels better; tried for a month   - Nurtec ODT 75 mg every other day = did not work; tried 12/7/23-3/7/24  - Sumatriptan  - Topamax 300 mg twice daily (worked, but she felt like she started to have memory issues with this - stopped  "about a year ago)   - Propranolol (took in her 20's; now on atenolol)  - Botox    Allergies   Allergen Reactions    Azithromycin Other    Erythromycin Unknown    Fentanyl Unknown and Other     Other reaction(s): doesnt work, Other (See Comments), Unknown   NON THERAPUTIC    NON THERAPUTIC    Icosapent Ethyl Unknown    Levofloxacin Unknown    Metronidazole Unknown    Ondansetron Hcl Unknown    Penicillins Unknown    Erythromycin Base Rash    Iodine Rash and Unknown     Other reaction(s): Other (See Comments), Unknown   Chemical peritonitis    Chemical peritonitis    Lisinopril Rash       Objective     There were no vitals taken for this visit.    Diabetes Pharmacotherapy:    Ozempic 1 mg weekly (Saturdays)  Basaglar 15 units daily  Novolog SS  Farxiga 10 mg daily  Metformin 1000 mg twice daily    Historical Pharmacotherapy  Victoza 1.2 mg daily    SECONDARY PREVENTION  - Statin? Yes   - ACE-I/ARB? Yes  - Aspirin? Yes    Pertinent PMH Review:  - PMH of Pancreatitis: No  - PMH of Retinopathy: No  - PMH of Urinary Tract Infections: No  - PMH of MTC: No    Lab Review  Lab Results   Component Value Date    BILITOT 0.4 02/12/2021    CALCIUM 9.4 02/12/2021    CO2 26 02/12/2021     02/12/2021    CREATININE 0.80 02/12/2021    GLUCOSE 134 (H) 02/12/2021    ALKPHOS 59 02/12/2021    K 4.2 02/12/2021    PROT 6.8 02/12/2021     02/12/2021    AST 14 02/12/2021    ALT 21 02/12/2021    BUN 20 02/12/2021    ANIONGAP 11 02/12/2021    ALBUMIN 4.3 02/12/2021    AMYLASE 32 02/12/2021    LIPASE 43 02/12/2021     No results found for: \"TRIG\", \"CHOL\", \"LDL\", \"HDL\"  Lab Results   Component Value Date    HGBA1C 6.5 (H) 06/17/2024    HGBA1C 6.9 (H) 04/30/2024    HGBA1C 7.3 (A) 04/11/2024     The ASCVD Risk score (Esperanza DK, et al., 2019) failed to calculate for the following reasons:    Cannot find a previous HDL lab    Cannot find a previous total cholesterol lab    Drug Interactions:  None    Assessment/Plan   Problem List Items " Addressed This Visit       Type 2 diabetes mellitus with obesity (Multi)     ASSESSMENT:  Patient's diabetes is well controlled with most recent A1c of 6.9% (Goal <7%). Now that Mounjaro is back in stock, patient requests to go back to this. Sending Rx for 5 mg.     PLAN:  CHANGE Ozempic to Mounjaro 5 mg weekly  Continue all other pharmacotherapy. Will consider lowering insulin depending on reported blood sugars at next follow up.  Continue testing blood sugars with freestyle vika 3 - sent invite to connect via [employer]         Relevant Medications    tirzepatide (Mounjaro) 5 mg/0.5 mL pen injector    Other Relevant Orders    Follow Up In Clinical Pharmacy    Migraines     ASSESSMENT:  Patient with a history of chronic migraines (>15/month) who has tried many different preventative/abortive therapies in the past without much improvement (see HPI for past therapies). She last saw her neurologist 2 years ago. Recalls discussing biologics in the past but can't remember why they didn't start one. She last tried Nurtec and Qulipta and neither helped. Primarily managing with pain + nausea medications currently. At this point, the next step in therapy is injectable biologics.     PLAN:  Submitted PA to insurance for Emgality. If approved, she will inject 240 mg loading dose and 120 mg once monthly thereafter.  Discussed MOA, ADR's, administration, etc. Answered all patient's questions.         Relevant Orders    Follow Up In Clinical Pharmacy     Follow up: 7/16 10am    Kelsea Kaur, PharmD, [facility]  Clinical Pharmacist     Continue all meds under the continuation of care with the referring provider and clinical pharmacy team.

## 2024-06-18 NOTE — ASSESSMENT & PLAN NOTE
ASSESSMENT:  Patient with a history of chronic migraines (>15/month) who has tried many different preventative/abortive therapies in the past without much improvement (see HPI for past therapies). She last saw her neurologist 2 years ago. Recalls discussing biologics in the past but can't remember why they didn't start one. She last tried Nurtec and Qulipta and neither helped. Primarily managing with pain + nausea medications currently. At this point, the next step in therapy is injectable biologics.     PLAN:  Submitted PA to insurance for Emgality. If approved, she will inject 240 mg loading dose and 120 mg once monthly thereafter.  Discussed MOA, ADR's, administration, etc. Answered all patient's questions.

## 2024-06-18 NOTE — TELEPHONE ENCOUNTER
Patient called in for refill on Hydrocodone    Pharmacy: [facility]

## 2024-06-19 PROCEDURE — RXMED WILLOW AMBULATORY MEDICATION CHARGE

## 2024-06-20 ENCOUNTER — PHARMACY VISIT (OUTPATIENT)
Dept: PHARMACY | Facility: CLINIC | Age: 59
End: 2024-06-20
Payer: MEDICARE

## 2024-07-16 ENCOUNTER — APPOINTMENT (OUTPATIENT)
Dept: PHARMACY | Facility: HOSPITAL | Age: 59
End: 2024-07-16
Payer: COMMERCIAL

## 2024-07-16 DIAGNOSIS — E66.9 TYPE 2 DIABETES MELLITUS WITH OBESITY (MULTI): ICD-10-CM

## 2024-07-16 DIAGNOSIS — E11.69 TYPE 2 DIABETES MELLITUS WITH OBESITY (MULTI): ICD-10-CM

## 2024-07-16 DIAGNOSIS — G43.719 INTRACTABLE CHRONIC MIGRAINE WITHOUT AURA AND WITHOUT STATUS MIGRAINOSUS: ICD-10-CM

## 2024-07-16 PROCEDURE — RXMED WILLOW AMBULATORY MEDICATION CHARGE

## 2024-07-16 RX ORDER — GALCANEZUMAB 120 MG/ML
INJECTION, SOLUTION SUBCUTANEOUS
Qty: 2 ML | Refills: 5 | Status: SHIPPED | OUTPATIENT
Start: 2024-07-16

## 2024-07-16 RX ORDER — TIRZEPATIDE 7.5 MG/.5ML
7.5 INJECTION, SOLUTION SUBCUTANEOUS
Qty: 2 ML | Refills: 1 | Status: SHIPPED | OUTPATIENT
Start: 2024-07-16

## 2024-07-16 ASSESSMENT — ENCOUNTER SYMPTOMS: DIABETIC ASSOCIATED SYMPTOMS: 0

## 2024-07-16 NOTE — ASSESSMENT & PLAN NOTE
ASSESSMENT:  Patient's diabetes is well controlled with most recent A1c of 6.9% (Goal <7%). Doing well with Mounjaro so far - blood sugars well controlled, but weight loss feels slower than before. Ready for dose increase.     PLAN:  INCREASE Mounjaro to 7.5 mg weekly  Continue all other pharmacotherapy. Will consider lowering insulin depending on reported blood sugars at future follow ups.  Continue testing blood sugars with freestyle vika 3 - sent invite to connect via [employer]

## 2024-07-16 NOTE — ASSESSMENT & PLAN NOTE
ASSESSMENT:  Patient with a history of chronic migraines (>15/month) who has tried many different preventative/abortive therapies in the past without much improvement (see HPI for past therapies). She last saw her neurologist 2 years ago. Recalls discussing biologics in the past but can't remember why they didn't start one. She last tried Nurtec and Qulipta and neither helped. Primarily managing with pain + nausea medications currently. At this point, the next step in therapy is injectable biologics. Em[employer] PA submitted and approved.     PLAN:  START Emgality 240 mg as single loading dose, then 120 mg once monthly thereafter  Discussed MOA, ADR's, administration, etc. Answered all patient's questions.

## 2024-07-16 NOTE — PROGRESS NOTES
Pharmacist Clinic: Diabetes Management  Mary Jean Baptiste is a 59 y.o. female was referred to Clinical Pharmacy Team for diabetes management.   Referring Provider: Kimani Li MD   - Last visit 5/2/24    Subjective   Diabetes  She presents for her initial diabetic visit. She has type 2 diabetes mellitus. There are no hypoglycemic associated symptoms. There are no diabetic associated symptoms. There are no hypoglycemic complications. There are no diabetic complications. Current diabetic treatment includes oral agent (triple therapy) and insulin injections.     HPI    DIABETES     - Doing better with Mounjaro vs. Ozempic    Current diet:   - Significant reduction in cravings/appetite; it wears off at end of week for each injection  - Overall tries to watch carb/starch intake    Weight loss:  - A little slower than before  - 15 lbs    Current monitoring regimen:   Patient is using: glucometer; freestyle vika 3 - connected via [employer]    Reported blood sugars:         Any episodes of hypoglycemia? none    Adverse Effects:   A little nausea if she eats too much    MIGRAINES    - Previously interested in Ubrelvy; non formulary - Qulipta/Nurtec preferred  - Previously saw neurology but hasn't seen in a couple years  - Discussed injectable biologics in the past but neuro wanted to hold off; can't recall why  - Em[employer] PA approved; patient willing to try    Frequency:  - A couple times per week  - Currently having low grade headache consistently    Current pharmacotherapy:  - None; just pain medication + compazine     Historical pharmacotherapy:  - Qulipta - made her feel tired and had a low grade headache all the time; since stopping she feels better; tried for a month   - Nurtec ODT 75 mg every other day = did not work; tried 12/7/23-3/7/24  - Sumatriptan  - Topamax 300 mg twice daily (worked, but she felt like she started to have memory issues with this - stopped about a year ago)   - Propranolol (took in her  "20's; now on atenolol)  - Botox    Allergies   Allergen Reactions    Azithromycin Other    Erythromycin Unknown    Fentanyl Unknown and Other     Other reaction(s): doesnt work, Other (See Comments), Unknown   NON THERAPUTIC    NON THERAPUTIC    Icosapent Ethyl Unknown    Levofloxacin Unknown    Metronidazole Unknown    Ondansetron Hcl Unknown    Penicillins Unknown    Erythromycin Base Rash    Iodine Rash and Unknown     Other reaction(s): Other (See Comments), Unknown   Chemical peritonitis    Chemical peritonitis    Lisinopril Rash       Objective     There were no vitals taken for this visit.    Diabetes Pharmacotherapy:    Mounjaro 5 mg weekly (Saturdays)  Basaglar 15 units daily  Farxiga 10 mg daily  Metformin 1000 mg twice daily    Historical Pharmacotherapy  Victoza 1.2 mg daily  Novolog SS (hasn't needed to use)    SECONDARY PREVENTION  - Statin? Yes   - ACE-I/ARB? Yes  - Aspirin? Yes    Pertinent PMH Review:  - PMH of Pancreatitis: No  - PMH of Retinopathy: No  - PMH of Urinary Tract Infections: No  - PMH of MTC: No    Lab Review  Lab Results   Component Value Date    BILITOT 0.4 02/12/2021    CALCIUM 9.4 02/12/2021    CO2 26 02/12/2021     02/12/2021    CREATININE 0.80 02/12/2021    GLUCOSE 134 (H) 02/12/2021    ALKPHOS 59 02/12/2021    K 4.2 02/12/2021    PROT 6.8 02/12/2021     02/12/2021    AST 14 02/12/2021    ALT 21 02/12/2021    BUN 20 02/12/2021    ANIONGAP 11 02/12/2021    ALBUMIN 4.3 02/12/2021    AMYLASE 32 02/12/2021    LIPASE 43 02/12/2021     No results found for: \"TRIG\", \"CHOL\", \"LDL\", \"HDL\"  Lab Results   Component Value Date    HGBA1C 6.5 (H) 06/17/2024    HGBA1C 6.9 (H) 04/30/2024    HGBA1C 7.3 (A) 04/11/2024     The ASCVD Risk score (Esperanza DK, et al., 2019) failed to calculate for the following reasons:    Cannot find a previous HDL lab    Cannot find a previous total cholesterol lab    Drug Interactions:  None    Assessment/Plan   Problem List Items Addressed This Visit  "      Type 2 diabetes mellitus with obesity (Multi)     ASSESSMENT:  Patient's diabetes is well controlled with most recent A1c of 6.9% (Goal <7%). Doing well with Mounjaro so far - blood sugars well controlled, but weight loss feels slower than before. Ready for dose increase.     PLAN:  INCREASE Mounjaro to 7.5 mg weekly  Continue all other pharmacotherapy. Will consider lowering insulin depending on reported blood sugars at future follow ups.  Continue testing blood sugars with freestyle vika 3 - sent invite to connect via [employer]         Relevant Medications    tirzepatide (Mounjaro) 7.5 mg/0.5 mL pen injector    Other Relevant Orders    Follow Up In Clinical Pharmacy    Migraines     ASSESSMENT:  Patient with a history of chronic migraines (>15/month) who has tried many different preventative/abortive therapies in the past without much improvement (see HPI for past therapies). She last saw her neurologist 2 years ago. Recalls discussing biologics in the past but can't remember why they didn't start one. She last tried Nurtec and Qulipta and neither helped. Primarily managing with pain + nausea medications currently. At this point, the next step in therapy is injectable biologics. Emg[employer] PA submitted and approved.     PLAN:  START Emgality 240 mg as single loading dose, then 120 mg once monthly thereafter  Discussed MOA, ADR's, administration, etc. Answered all patient's questions.         Relevant Medications    galcanezumab (Emgality Pen) 120 mg/mL auto-injector    Other Relevant Orders    Follow Up In Clinical Pharmacy     Follow up: 8/13 10am    Kelsea Kaur PharmD, [facility]  Clinical Pharmacist     Continue all meds under the continuation of care with the referring provider and clinical pharmacy team.

## 2024-07-18 ENCOUNTER — PHARMACY VISIT (OUTPATIENT)
Dept: PHARMACY | Facility: CLINIC | Age: 59
End: 2024-07-18
Payer: MEDICARE

## 2024-07-18 ENCOUNTER — APPOINTMENT (OUTPATIENT)
Dept: PRIMARY CARE | Facility: CLINIC | Age: 59
End: 2024-07-18
Payer: COMMERCIAL

## 2024-07-18 VITALS
OXYGEN SATURATION: 99 % | BODY MASS INDEX: 36.19 KG/M2 | DIASTOLIC BLOOD PRESSURE: 77 MMHG | RESPIRATION RATE: 16 BRPM | HEART RATE: 78 BPM | HEIGHT: 64 IN | WEIGHT: 212 LBS | SYSTOLIC BLOOD PRESSURE: 107 MMHG | TEMPERATURE: 97.4 F

## 2024-07-18 DIAGNOSIS — F32.A ANXIETY AND DEPRESSION: ICD-10-CM

## 2024-07-18 DIAGNOSIS — Z86.59 HISTORY OF DEPRESSION: ICD-10-CM

## 2024-07-18 DIAGNOSIS — E66.9 TYPE 2 DIABETES MELLITUS WITH OBESITY (MULTI): Primary | ICD-10-CM

## 2024-07-18 DIAGNOSIS — E03.9 ACQUIRED HYPOTHYROIDISM: ICD-10-CM

## 2024-07-18 DIAGNOSIS — F41.9 ANXIETY: ICD-10-CM

## 2024-07-18 DIAGNOSIS — G43.809 HEADACHE, VARIANT MIGRAINE: ICD-10-CM

## 2024-07-18 DIAGNOSIS — E11.69 TYPE 2 DIABETES MELLITUS WITH OBESITY (MULTI): Primary | ICD-10-CM

## 2024-07-18 DIAGNOSIS — F41.9 ANXIETY AND DEPRESSION: ICD-10-CM

## 2024-07-18 DIAGNOSIS — I10 ESSENTIAL HYPERTENSION: ICD-10-CM

## 2024-07-18 DIAGNOSIS — E11.9 TYPE 2 DIABETES MELLITUS WITHOUT COMPLICATION, WITH LONG-TERM CURRENT USE OF INSULIN (MULTI): ICD-10-CM

## 2024-07-18 DIAGNOSIS — R11.0 NAUSEA IN ADULT: ICD-10-CM

## 2024-07-18 DIAGNOSIS — G43.719 INTRACTABLE CHRONIC MIGRAINE WITHOUT AURA AND WITHOUT STATUS MIGRAINOSUS: ICD-10-CM

## 2024-07-18 DIAGNOSIS — Z79.4 TYPE 2 DIABETES MELLITUS WITHOUT COMPLICATION, WITH LONG-TERM CURRENT USE OF INSULIN (MULTI): ICD-10-CM

## 2024-07-18 DIAGNOSIS — E78.00 HYPERCHOLESTEREMIA: ICD-10-CM

## 2024-07-18 PROCEDURE — 3008F BODY MASS INDEX DOCD: CPT | Performed by: INTERNAL MEDICINE

## 2024-07-18 PROCEDURE — 1036F TOBACCO NON-USER: CPT | Performed by: INTERNAL MEDICINE

## 2024-07-18 PROCEDURE — 3078F DIAST BP <80 MM HG: CPT | Performed by: INTERNAL MEDICINE

## 2024-07-18 PROCEDURE — 4010F ACE/ARB THERAPY RXD/TAKEN: CPT | Performed by: INTERNAL MEDICINE

## 2024-07-18 PROCEDURE — 99214 OFFICE O/P EST MOD 30 MIN: CPT | Performed by: INTERNAL MEDICINE

## 2024-07-18 PROCEDURE — 3074F SYST BP LT 130 MM HG: CPT | Performed by: INTERNAL MEDICINE

## 2024-07-18 RX ORDER — BUPROPION HYDROCHLORIDE 150 MG/1
150 TABLET ORAL DAILY
Qty: 90 TABLET | Refills: 1 | Status: SHIPPED | OUTPATIENT
Start: 2024-07-18 | End: 2025-01-14

## 2024-07-18 RX ORDER — LEVOTHYROXINE SODIUM 88 UG/1
88 TABLET ORAL DAILY
Qty: 90 TABLET | Refills: 1 | Status: SHIPPED | OUTPATIENT
Start: 2024-07-18

## 2024-07-18 RX ORDER — METFORMIN HYDROCHLORIDE 500 MG/1
1000 TABLET, EXTENDED RELEASE ORAL 2 TIMES DAILY
Qty: 360 TABLET | Refills: 1 | Status: SHIPPED | OUTPATIENT
Start: 2024-07-18

## 2024-07-18 RX ORDER — INSULIN GLARGINE 100 [IU]/ML
15 INJECTION, SOLUTION SUBCUTANEOUS NIGHTLY
Qty: 3 ML | Refills: 1 | Status: SHIPPED | OUTPATIENT
Start: 2024-07-18

## 2024-07-18 RX ORDER — HYDROCODONE BITARTRATE AND ACETAMINOPHEN 5; 325 MG/1; MG/1
1 TABLET ORAL 2 TIMES DAILY PRN
Qty: 20 TABLET | Refills: 0 | Status: SHIPPED | OUTPATIENT
Start: 2024-07-18

## 2024-07-18 RX ORDER — FENOFIBRATE 160 MG/1
160 TABLET ORAL DAILY
Qty: 90 TABLET | Refills: 1 | Status: SHIPPED | OUTPATIENT
Start: 2024-07-18

## 2024-07-18 RX ORDER — HYDROXYZINE PAMOATE 25 MG/1
25 CAPSULE ORAL 3 TIMES DAILY PRN
Qty: 90 CAPSULE | Refills: 3 | Status: SHIPPED | OUTPATIENT
Start: 2024-07-18

## 2024-07-18 RX ORDER — DAPAGLIFLOZIN 10 MG/1
10 TABLET, FILM COATED ORAL
Qty: 90 TABLET | Refills: 1 | Status: SHIPPED | OUTPATIENT
Start: 2024-07-18 | End: 2025-01-14

## 2024-07-18 RX ORDER — NEEDLES, FILTER 19GX1 1/2"
1 NEEDLE, DISPOSABLE MISCELLANEOUS ONCE AS NEEDED
Qty: 10 EACH | Refills: 3 | Status: SHIPPED | OUTPATIENT
Start: 2024-07-18

## 2024-07-18 RX ORDER — AMLODIPINE BESYLATE 5 MG/1
5 TABLET ORAL DAILY
Qty: 90 TABLET | Refills: 1 | Status: SHIPPED | OUTPATIENT
Start: 2024-07-18

## 2024-07-18 RX ORDER — PROMETHAZINE HYDROCHLORIDE 25 MG/1
25 TABLET ORAL 3 TIMES DAILY PRN
Qty: 30 TABLET | Refills: 3 | Status: SHIPPED | OUTPATIENT
Start: 2024-07-18

## 2024-07-18 RX ORDER — KETOROLAC TROMETHAMINE 30 MG/ML
INJECTION, SOLUTION INTRAMUSCULAR; INTRAVENOUS
Qty: 10 ML | Refills: 2 | Status: SHIPPED | OUTPATIENT
Start: 2024-07-18

## 2024-07-18 RX ORDER — LOSARTAN POTASSIUM 100 MG/1
100 TABLET ORAL DAILY
Qty: 90 TABLET | Refills: 1 | Status: SHIPPED | OUTPATIENT
Start: 2024-07-18

## 2024-07-18 RX ORDER — FLUOXETINE HYDROCHLORIDE 20 MG/1
20 CAPSULE ORAL DAILY
Qty: 90 CAPSULE | Refills: 1 | Status: SHIPPED | OUTPATIENT
Start: 2024-07-18 | End: 2025-01-14

## 2024-07-18 SDOH — ECONOMIC STABILITY: FOOD INSECURITY: WITHIN THE PAST 12 MONTHS, THE FOOD YOU BOUGHT JUST DIDN'T LAST AND YOU DIDN'T HAVE MONEY TO GET MORE.: NEVER TRUE

## 2024-07-18 SDOH — ECONOMIC STABILITY: FOOD INSECURITY: WITHIN THE PAST 12 MONTHS, YOU WORRIED THAT YOUR FOOD WOULD RUN OUT BEFORE YOU GOT MONEY TO BUY MORE.: NEVER TRUE

## 2024-07-18 ASSESSMENT — LIFESTYLE VARIABLES
AUDIT-C TOTAL SCORE: 0
HOW MANY STANDARD DRINKS CONTAINING ALCOHOL DO YOU HAVE ON A TYPICAL DAY: PATIENT DOES NOT DRINK
HOW OFTEN DO YOU HAVE A DRINK CONTAINING ALCOHOL: NEVER
SKIP TO QUESTIONS 9-10: 1
HOW OFTEN DO YOU HAVE SIX OR MORE DRINKS ON ONE OCCASION: NEVER

## 2024-07-18 NOTE — PROGRESS NOTES
"Chief Complaint/HPI:    stressors at home: home issues have improved, she has been stressed, daughter is moved out of home.     Anxiety/ stressors: patient takes Vistaril for anxiety, she  feels better now, she  takes bupropion 150 mg now, and fluoxetine      HA: HA is  occurring a few times per week I have personally reviewed the OARRS report. This report is scanned into the electronic medical record. I have considered the risks of abuse, dependence, addiction and diversion. I believe that it is clinically appropriate to prescribe this medication. Patient uses Norco as needed. She also uses Toradol IM, Phenergan and dexamethasone, Nyrtec does not seen to be effective . She uses Toradol up to twice weekly. Patient did receive Emgality for treatment of HA      Obesity: patient went to [facility] bariatrics in [place]. Patient did initially lose weight, she has had stressors due to issues with daughter recently, patient prefers medical management to surgical management of obesity, patient has now stopped going to bariatrics, she did start Mounjaro, it does help with weight loss, she has lost about 48 #.  She is scheduled to see [facility] for assessment. 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     DM type 2: Glucoses have been better, Hgb A1C is 6.5, patient now uses Basiglar . She does use SSI, Farxiga, Mounjaro has been effective, she took Ozempic while Mounjaro was not available. Patient does not think that this was effective. She is now taking Mounjaro again.     breast lesion: patient still sees breast specialist at [facility], this is not changed     hypothyroidism : patient takes levothyroxine 88 mcg daily        ROS otherwise negative aside from " what was mentioned above in HPI.      Patient Active Problem List   Diagnosis    Abdominal pain, acute, right upper quadrant    Acute viral syndrome    Allergies    Anxiety and depression    Atypical chest pain    Breast mass, right    Bronchiectasis (Multi)    Chest heaviness    Chronic headache    Cramp in muscle    Diverticulosis of large intestine    Type 2 diabetes mellitus with obesity (Multi)    Eruption due to drug    Essential hypertension    Frequent urination    Gastroenteritis, acute    Headache, variant migraine    Hematoma of right lower extremity    Hypothyroidism    Metabolic syndrome X    Migraines    Nausea in adult    Neuropathy    Multiple lung nodules on CT    Obesity with body mass index (BMI) of 30.0 to 39.9    Occupational asthma (HHS-HCC)    Mixed hyperlipidemia    Palpitations    Postmenopausal bone loss    Productive cough    Tonsil stone    Tonsillitis    Vitamin D insufficiency    Anxiety    Atherosclerosis of coronary artery    Chronic nonalcoholic liver disease    COPD (chronic obstructive pulmonary disease) (Multi)    Daytime sleepiness    Diabetes mellitus (Multi)    Diverticulitis    Fibrocystic breast changes of both breasts    GERD (gastroesophageal reflux disease)    History of depression    History of hypertension    Mastodynia of right breast    PLATA (dyspnea on exertion)    Shortness of breath at rest    Asthma (HHS-HCC)    Sleep apnea    Solitary cyst of left breast    Hematoma of lower extremity    Vitamin D deficiency    Hypercholesteremia    Mild chronic obstructive pulmonary disease (Multi)    Malaise and fatigue    History of right breast biopsy    Morbid obesity, unspecified obesity type (Multi)         Past Medical History:   Diagnosis Date    ADHD (attention deficit hyperactivity disorder)     Anxiety     Asthma (HHS-HCC)     Cataract     COPD (chronic obstructive pulmonary disease) (Multi)     Depression     Diabetes mellitus (Multi)     Disease of thyroid gland      Eating disorder     Eczema     GERD (gastroesophageal reflux disease)     Headache     Hypertension     Personal history of other diseases of the circulatory system     History of hypertension    Personal history of other diseases of the female genital tract     History of polycystic ovarian syndrome    Personal history of other diseases of the respiratory system     History of asthma    Personal history of other mental and behavioral disorders     History of depression     Past Surgical History:   Procedure Laterality Date    BREAST SURGERY  2005    CARDIAC CATHETERIZATION  2004    EYE SURGERY      HERNIA REPAIR  1965,2002    HYSTERECTOMY  2000    OTHER SURGICAL HISTORY  10/25/2019    Hysterectomy    OTHER SURGICAL HISTORY  10/25/2019    Turbinectomy    SINUS SURGERY      WISDOM TOOTH EXTRACTION  1981     Social History     Social History Narrative    Not on file         ALLERGIES  Azithromycin, Erythromycin, Fentanyl, Icosapent ethyl, Levofloxacin, Metronidazole, Ondansetron hcl, Penicillins, Erythromycin base, Iodine, and Lisinopril      MEDICATIONS  Current Outpatient Medications on File Prior to Visit   Medication Sig Dispense Refill    albuterol 2.5 mg /3 mL (0.083 %) nebulizer solution Take 3 mL (2.5 mg) by nebulization 4 times a day as needed for wheezing or shortness of breath. 360 mL 11    aspirin 81 mg EC tablet Take by mouth.      atenolol (Tenormin) 50 mg tablet Take 1 tablet (50 mg) by mouth 2 times a day. 180 tablet 1    atogepant (Qulipta) 60 mg tablet tablet Take 1 tablet (60 mg) by mouth once daily. 30 tablet 5    atorvastatin (Lipitor) 40 mg tablet Take 1 tablet (40 mg) by mouth once daily. as directed 90 tablet 1    cholecalciferol (Vitamin D-3) 50 mcg (2,000 unit) capsule Take 1 capsule (50 mcg) by mouth once daily.      dexAMETHasone (Decadron) 0.75 mg tablet Take 2 tablets (1.5 mg) by mouth 2 times a day with meals. 360 tablet 3    fluticasone furoate-vilanteroL (Breo Elipta) 200-25 mcg/dose  "inhaler Inhale 1 puff once daily.      FreeStyle Davey 3 Sensor device use as directed AND CHANGE EVERY 14 DAYS      insulin glargine-yfgn 100 unit/mL (3 mL) Pen Inject 15 Units under the skin once daily. Take as directed per insulin instructions. 15 mL 3    magnesium oxide 500 mg tablet Take 1 tablet (500 mg) by mouth 2 times a day.      naloxone (Narcan) 4 mg/0.1 mL nasal spray Administer into affected nostril(s).      NovoLOG Flexpen U-100 Insulin 100 unit/mL (3 mL) pen Inject 5 Units under the skin see administration instructions. Inject 5 units under the skin with meals if glucose is over 200. 15 mL 3    omeprazole (PriLOSEC) 40 mg DR capsule Take 1 capsule (40 mg) by mouth once daily in the morning. Take before meals. 90 capsule 1    OneTouch Ultra Test strip OneTouch Ultra Blue STRP   Refills: 0        Start : 18-Dec-2018   Active      pen needle, diabetic (Droplet Pen Needle) 32 gauge x 5/32\" needle Inject 1 Pen needle under the skin if needed (with insulin injections). 200 each 5    Qvar RediHaler 80 mcg/actuation inhaler Inhale 2 Inhalations 2 times a day.      sodium fluoride-pot nitrate 1.1-5 % paste BRUSH TWICE A DAY FOR 2 MINUTES AND DO NTO EAT OR DRINK FOR 1 HOUR AFTER BRUSHING      spironolactone (Aldactone) 25 mg tablet take 1/2 tablet by mouth once daily 45 tablet 1    tiotropium (Spiriva Respimat) 2.5 mcg/actuation inhaler Inhale 2 puffs once daily.      tirzepatide (Mounjaro) 7.5 mg/0.5 mL pen injector Inject 7.5 mg under the skin every 7 days. 2 mL 1    [DISCONTINUED] amLODIPine (Norvasc) 5 mg tablet Take 1 tablet (5 mg) by mouth once daily. as directed 90 tablet 1    [DISCONTINUED] Basaglar KwikPen U-100 Insulin 100 unit/mL (3 mL) pen Inject 15 Units under the skin once daily at bedtime.      [DISCONTINUED] BD Integra Syringe 3 mL 25 gauge x 1\" syringe use as directed for TORADOL INJECTION intramuscularly if needed      [DISCONTINUED] buPROPion XL (Wellbutrin XL) 150 mg 24 hr tablet Take 1 " tablet (150 mg) by mouth once daily. 90 tablet 1    [DISCONTINUED] dapagliflozin propanediol (Farxiga) 10 mg Take 1 tablet (10 mg) by mouth once daily in the morning. Take before meals. 90 tablet 1    [DISCONTINUED] fenofibrate (Triglide) 160 mg tablet Take 1 tablet (160 mg) by mouth once daily. 90 tablet 1    [DISCONTINUED] FLUoxetine (PROzac) 20 mg capsule Take 1 capsule (20 mg) by mouth once daily. 90 capsule 1    [DISCONTINUED] HYDROcodone-acetaminophen (Norco) 5-325 mg tablet Take 1 tablet by mouth 2 times a day as needed for severe pain (7 - 10). 20 tablet 0    [DISCONTINUED] hydrOXYzine pamoate (VistariL) 25 mg capsule Take 1 capsule (25 mg) by mouth 3 times a day as needed for anxiety. 90 capsule 3    [DISCONTINUED] ketorolac 30 mg/mL (1 mL) injection inject 1 milliliter intramuscularly daily if needed for migraine, do not use more than 2 consecutive days 10 mL 2    [DISCONTINUED] levothyroxine (Synthroid, Levoxyl) 88 mcg tablet Take 1 tablet (88 mcg) by mouth once daily. 90 tablet 1    [DISCONTINUED] losartan (Cozaar) 100 mg tablet Take 1 tablet (100 mg) by mouth once daily. 90 tablet 1    [DISCONTINUED] metFORMIN  mg 24 hr tablet Take 2 tablets (1,000 mg) by mouth 2 times a day. 360 tablet 1    [DISCONTINUED] promethazine (Phenergan) 25 mg tablet Take 1 tablet (25 mg) by mouth 3 times a day as needed for nausea or vomiting. 30 tablet 3    b complex vitamins capsule Take 1 capsule by mouth 2 times a day.      galcanezumab (Emgality Pen) 120 mg/mL auto-injector Inject 240 mg (two 120 mg injections) as a single loading dose, then inject 120 mg once monthly thereafter as directed. (Patient not taking: Reported on 7/18/2024) 2 mL 5    [DISCONTINUED] tirzepatide (Mounjaro) 5 mg/0.5 mL pen injector Inject 5 mg under the skin every 7 days. 2 mL 1     No current facility-administered medications on file prior to visit.         PHYSICAL EXAM  /77 (BP Location: Left arm, Patient Position: Sitting, BP Cuff  "Size: Large adult)   Pulse 78   Temp 36.3 °C (97.4 °F)   Resp 16   Ht 1.619 m (5' 3.75\")   Wt 96.2 kg (212 lb)   SpO2 99%   BMI 36.68 kg/m²   Body mass index is 36.68 kg/m².  Constitutional   General appearance:  well developed, appears healthy, well nourished, obese, she has lost significant weight. pleasant female, NAD.     Eyes   Inspection of eyes: Sclera and conjunctiva were normal.  Wears glasses  Ears, Nose, Mouth, and Throat   Ears: Auricles: Normal.  Neck is supple, no bruits , no masses, no thyromegaly is appreciated  Pulmonary   Respiratory assessment: No respiratory distress, normal respiratory rhythm and effort.    Auscultation of Lungs:  bilateral inspiratory wheezes are noted, no rales or crackles were heard bilaterally. no rhonchi. no friction rub. no diminished breath sounds  Cardiovascular   Auscultation of heart: Apical pulse normal, heart rate and rhythm normal, normal S1 and S2, no murmurs and no pericardial rub.    Exam for edema: no edema is noted today  Lymphatic   Palpation of lymph nodes in neck: No cervical lymphadenopathy.   Neurologic   Cranial nerves: Nerves 2-12 were intact, no focal neuro defects.    Psychiatric   Orientation: Oriented to person, place, and time.    Mood and affect: Normal.     ASSESSMENT/PLAN  Problem List Items Addressed This Visit       Anxiety    Relevant Medications    hydrOXYzine pamoate (VistariL) 25 mg capsule    Anxiety and depression    Relevant Medications    buPROPion XL (Wellbutrin XL) 150 mg 24 hr tablet    Diabetes mellitus (Multi)    Essential hypertension    Relevant Medications    amLODIPine (Norvasc) 5 mg tablet    losartan (Cozaar) 100 mg tablet    Headache, variant migraine    Relevant Medications    ketorolac 30 mg/mL (1 mL) injection    syringe with needle, safety (BD Integra Syringe) 3 mL 25 gauge x 1\" syringe    History of depression    Relevant Medications    FLUoxetine (PROzac) 20 mg capsule    Hypercholesteremia    Relevant " Medications    fenofibrate (Triglide) 160 mg tablet    Hypothyroidism    Relevant Medications    levothyroxine (Synthroid, Levoxyl) 88 mcg tablet    Migraines    Relevant Medications    HYDROcodone-acetaminophen (Norco) 5-325 mg tablet    Nausea in adult    Relevant Medications    promethazine (Phenergan) 25 mg tablet    Type 2 diabetes mellitus with obesity (Multi) - Primary    Relevant Medications    metFORMIN  mg 24 hr tablet    Basaglar KwikPen U-100 Insulin 100 unit/mL (3 mL) pen    dapagliflozin propanediol (Farxiga) 10 mg     Check labs already ordered. I have personally reviewed the OARRS report.  This report is scanned into the electronic medical record.  I have considered the risks of abuse, dependence, addiction and diversion.  I believe that it is clinically appropriate to prescribe this medication. Refill of [place] is ordered. Trial of Emgality is suggested for HA . Will defer the neurology evaluation for now until we determine if Emgality is at all effective.       Follow up in 3 months    MD Kimani Powell MD

## 2024-08-01 ENCOUNTER — APPOINTMENT (OUTPATIENT)
Dept: PRIMARY CARE | Facility: CLINIC | Age: 59
End: 2024-08-01
Payer: COMMERCIAL

## 2024-08-07 ENCOUNTER — TELEPHONE (OUTPATIENT)
Dept: PRIMARY CARE | Facility: CLINIC | Age: 59
End: 2024-08-07
Payer: COMMERCIAL

## 2024-08-07 DIAGNOSIS — G43.719 INTRACTABLE CHRONIC MIGRAINE WITHOUT AURA AND WITHOUT STATUS MIGRAINOSUS: ICD-10-CM

## 2024-08-07 RX ORDER — HYDROCODONE BITARTRATE AND ACETAMINOPHEN 5; 325 MG/1; MG/1
1 TABLET ORAL 2 TIMES DAILY PRN
Qty: 20 TABLET | Refills: 0 | Status: SHIPPED | OUTPATIENT
Start: 2024-08-07

## 2024-08-07 NOTE — TELEPHONE ENCOUNTER
Pt called in requesting refill   HYDROcodone-acetaminophen (Norco) 5-325 mg tablet     Pharmacy:   [id number] [facility] [address], [address]

## 2024-08-12 NOTE — PROGRESS NOTES
Pharmacist Clinic: Diabetes Management  Mary Jean Baptiste is a 59 y.o. female was referred to Clinical Pharmacy Team for diabetes management.   Referring Provider: Kimani Li MD   - Last visit 5/2/24    Subjective     HPI    DIABETES     - Patient gets cortisone shots into right knee    Current Pharmacotherapy:  - Mounjaro 7.5 mg weekly  - Basaglar 15 units in morning  - Farxiga 10 mg with breakfast   - Metformin XR 1000 mg BID  - Novolog 5 units PRN if glucose > 200   - Used couple times when got steroid shot recently but hasn't used in a few months before that    Current diet:   - Significant reduction in cravings/appetite; it wears off at end of week for each injection  - Overall tries to watch carb/starch intake  - B = protein shakes, protein shakes   - [facility]ariatric program at [facility]     Weight loss:  - A little slower than before  - Currently 200 lbs (was 212 lbs last month at PCP office)    Current monitoring regimen:   Patient is using: glucometer; freestyle vika 3 - connected via PowerMag    Reported blood sugars:     Any episodes of hypoglycemia? Yes  - Lows over night into morning  - Thinks its from losing weight     Adverse Effects:   Some constipation but not bad             MIGRAINES    - Previously interested in Ubrelvy; non formulary - Qulipta/Nurtec preferred  - Previously saw neurology but hasn't seen in a couple years  - Discussed injectable biologics in the past but neuro wanted to hold off; can't recall why  - Emgality PA approved; patient willing to try    Current Pharmacotherapy:  - Emgality 240 mg x 1 dose then 120 mg once monthly   - Started August 5    Frequency:  - Not as severe seem to be improving  - Better as they were a month  - Low grade headache half the days (decreased from everyday)   - Has not had to use Tordol    Current pharmacotherapy:  - None; just pain medication + compazine     Historical pharmacotherapy:  - Qulipta - made her feel tired and had a low grade headache  "all the time; since stopping she feels better; tried for a month   - Nurtec ODT 75 mg every other day = did not work; tried 12/7/23-3/7/24  - Sumatriptan  - Topamax 300 mg twice daily (worked, but she felt like she started to have memory issues with this - stopped about a year ago)   - Propranolol (took in her 20's; now on atenolol)  - Botox    Allergies   Allergen Reactions    Azithromycin Other    Erythromycin Unknown    Fentanyl Unknown and Other     Other reaction(s): doesnt work, Other (See Comments), Unknown   NON THERAPUTIC    NON THERAPUTIC    Icosapent Ethyl Unknown    Levofloxacin Unknown    Metronidazole Unknown    Ondansetron Hcl Unknown    Penicillins Unknown    Erythromycin Base Rash    Iodine Rash and Unknown     Other reaction(s): Other (See Comments), Unknown   Chemical peritonitis    Chemical peritonitis    Lisinopril Rash       Objective     There were no vitals taken for this visit.    Historical Pharmacotherapy  Victoza 1.2 mg daily  Novolog SS (hasn't needed to use)    SECONDARY PREVENTION  - Statin? Yes   - ACE-I/ARB? Yes  - Aspirin? Yes    Pertinent PMH Review:  - PMH of Pancreatitis: No  - PMH of Retinopathy: No  - PMH of Urinary Tract Infections: No  - PMH of MTC: No    Lab Review  Lab Results   Component Value Date    BILITOT 0.4 02/12/2021    CALCIUM 9.4 02/12/2021    CO2 26 02/12/2021     02/12/2021    CREATININE 0.80 02/12/2021    GLUCOSE 134 (H) 02/12/2021    ALKPHOS 59 02/12/2021    K 4.2 02/12/2021    PROT 6.8 02/12/2021     02/12/2021    AST 14 02/12/2021    ALT 21 02/12/2021    BUN 20 02/12/2021    ANIONGAP 11 02/12/2021    ALBUMIN 4.3 02/12/2021    AMYLASE 32 02/12/2021    LIPASE 43 02/12/2021     No results found for: \"TRIG\", \"CHOL\", \"LDL\", \"HDL\"  Lab Results   Component Value Date    HGBA1C 6.5 (H) 06/17/2024    HGBA1C 6.9 (H) 04/30/2024    HGBA1C 7.3 (A) 04/11/2024     The ASCVD Risk score (Esperanza SINGLETARY, et al., 2019) failed to calculate for the following reasons:    " Cannot find a previous HDL lab    Cannot find a previous total cholesterol lab    Drug Interactions:  None    Assessment/Plan   Problem List Items Addressed This Visit       Type 2 diabetes mellitus with obesity (Multi)    Relevant Orders    Follow Up In Clinical Pharmacy    Migraines    Relevant Orders    Follow Up In Clinical Pharmacy     Diabetes:   Patient's diabetes is controlled with A1c of 6.5%. She is having lows on CGM. A couple as low as 54. Has endo appointment on 8/14/24. Believe this is due to not eating as much as she was prior to Mounjaro and being on a diet set by [facility]'s Bariatric Program. We will decrease the Basaglar from 15 units to 10 units. Next appointment if she is still having lows, we can consider stopping the Basaglar and titrating Mounjaro. Her goal is to have bariatric surgery in the future.    PLAN:  - DECREASE Basaglar to 10 units  - CONTINUE current DM medications     Migraines:  Patient started on Emgality last appointment. Took her first dose on August 5. States severity has decreased and its something she can now ignore. We will give it more time to see what next month's dose does at well.    PLAN:  - CONTINUE Emgality      - Follow up with clinical pharmacist: 9/11/24 @ 10 am  - Follow up with PCP: 10/21/24    Thank you,  Coleen Zazueta, PharmD  Clinical Pharmacy Specialist - Primary Care  [phone]  [email or web address]      Continue all meds under the continuation of care with the referring provider and clinical pharmacy team.

## 2024-08-13 ENCOUNTER — APPOINTMENT (OUTPATIENT)
Dept: PHARMACY | Facility: HOSPITAL | Age: 59
End: 2024-08-13
Payer: COMMERCIAL

## 2024-08-13 DIAGNOSIS — G43.719 INTRACTABLE CHRONIC MIGRAINE WITHOUT AURA AND WITHOUT STATUS MIGRAINOSUS: ICD-10-CM

## 2024-08-13 DIAGNOSIS — E66.9 TYPE 2 DIABETES MELLITUS WITH OBESITY (MULTI): ICD-10-CM

## 2024-08-13 DIAGNOSIS — E11.69 TYPE 2 DIABETES MELLITUS WITH OBESITY (MULTI): ICD-10-CM

## 2024-08-13 PROCEDURE — RXMED WILLOW AMBULATORY MEDICATION CHARGE

## 2024-08-14 ENCOUNTER — PHARMACY VISIT (OUTPATIENT)
Dept: PHARMACY | Facility: CLINIC | Age: 59
End: 2024-08-14
Payer: MEDICARE

## 2024-09-04 DIAGNOSIS — G43.719 INTRACTABLE CHRONIC MIGRAINE WITHOUT AURA AND WITHOUT STATUS MIGRAINOSUS: ICD-10-CM

## 2024-09-04 RX ORDER — HYDROCODONE BITARTRATE AND ACETAMINOPHEN 5; 325 MG/1; MG/1
1 TABLET ORAL 2 TIMES DAILY PRN
Qty: 20 TABLET | Refills: 0 | Status: SHIPPED | OUTPATIENT
Start: 2024-09-04

## 2024-09-05 PROCEDURE — RXMED WILLOW AMBULATORY MEDICATION CHARGE

## 2024-09-06 ENCOUNTER — PHARMACY VISIT (OUTPATIENT)
Dept: PHARMACY | Facility: CLINIC | Age: 59
End: 2024-09-06
Payer: MEDICARE

## 2024-09-06 PROCEDURE — RXOTC WILLOW AMBULATORY OTC CHARGE

## 2024-09-10 NOTE — PROGRESS NOTES
Pharmacist Clinic: Diabetes Management  Mary Jean Baptiste is a 59 y.o. female was referred to Clinical Pharmacy Team for diabetes management.   Referring Provider: Kimani Li MD   - Last visit 5/2/24    Subjective     HPI    DIABETES     - Patient gets cortisone shots into right knee    Current Pharmacotherapy:  - Mounjaro 7.5 mg weekly - Thursdays   - Had to hold for a week due to procedure   - Basaglar 10 units in morning    -Some days trialing 6 units and 8 units  - Farxiga 10 mg with breakfast   - Metformin XR 1000 mg BID  - Novolog 5 units PRN if glucose > 200   - Used couple times when got steroid shot recently but hasn't used in a few months before that    Current diet:   - Significant reduction in cravings/appetite; it wears off at end of week for each injection  - Overall tries to watch carb/starch intake  - B = protein shakes, protein shakes   - Bariatric program at [facility] [place]     Exercise:  - Walks 3 x a week    Weight loss:  - Current weight: 199 lbs   - Weight last appt: 200 lbs  - Baseline weight: 212 lbs    Current monitoring regimen:   Patient is using: glucometer; freestyle vika 3 - connected via [employer]    Reported blood sugars:   See APG Report below    Any episodes of hypoglycemia? Yes    Adverse Effects:   Some constipation but not bad             MIGRAINES  - Previously saw neurology but hasn't seen in a couple years  - Discussed injectable biologics in the past but neuro wanted to hold off; can't recall why  - Emgality PA approved    Current Pharmacotherapy:  - Emgality 240 mg x 1 dose then 120 mg once monthly   - Started August 5    Frequency:  - Not as severe; seem to be improving  - Same number of headaches but can ignore for the most part  - 3-4 times a week but can function (1 day a week where she had to take pain meds)    Current pharmacotherapy:  - None; just pain medication + compazine     Historical pharmacotherapy:  - Qulipta - made her feel tired and had a low grade headache  "all the time; since stopping she feels better; tried for a month   - Nurtec ODT 75 mg every other day = did not work; tried 12/7/23-3/7/24  - Sumatriptan  - Topamax 300 mg twice daily (worked, but she felt like she started to have memory issues with this - stopped about a year ago)   - Propranolol (took in her 20's; now on atenolol)  - Botox    Allergies   Allergen Reactions    Azithromycin Other    Erythromycin Unknown    Fentanyl Unknown and Other     Other reaction(s): doesnt work, Other (See Comments), Unknown   NON THERAPUTIC    NON THERAPUTIC    Icosapent Ethyl Unknown    Levofloxacin Unknown    Metronidazole Unknown    Ondansetron Hcl Unknown    Penicillins Unknown    Erythromycin Base Rash    Iodine Rash and Unknown     Other reaction(s): Other (See Comments), Unknown   Chemical peritonitis    Chemical peritonitis    Lisinopril Rash       Objective     There were no vitals taken for this visit.    Historical Pharmacotherapy  Victoza 1.2 mg daily  Novolog SS (hasn't needed to use)    SECONDARY PREVENTION  - Statin? Yes   - ACE-I/ARB? Yes  - Aspirin? Yes    Pertinent PMH Review:  - PMH of Pancreatitis: No  - PMH of Retinopathy: No  - PMH of Urinary Tract Infections: No  - PMH of MTC: No    Lab Review  Lab Results   Component Value Date    BILITOT 0.4 02/12/2021    CALCIUM 9.4 02/12/2021    CO2 26 02/12/2021     02/12/2021    CREATININE 0.80 02/12/2021    GLUCOSE 134 (H) 02/12/2021    ALKPHOS 59 02/12/2021    K 4.2 02/12/2021    PROT 6.8 02/12/2021     02/12/2021    AST 14 02/12/2021    ALT 21 02/12/2021    BUN 20 02/12/2021    ANIONGAP 11 02/12/2021    ALBUMIN 4.3 02/12/2021    AMYLASE 32 02/12/2021    LIPASE 43 02/12/2021     No results found for: \"TRIG\", \"CHOL\", \"LDL\", \"HDL\"  Lab Results   Component Value Date    HGBA1C 6.5 (H) 06/17/2024    HGBA1C 6.9 (H) 04/30/2024    HGBA1C 7.3 (A) 04/11/2024     The ASCVD Risk score (Esperanza SINGLETARY, et al., 2019) failed to calculate for the following reasons:    " Cannot find a previous HDL lab    Cannot find a previous total cholesterol lab    Drug Interactions:  None    Assessment/Plan   Problem List Items Addressed This Visit       Type 2 diabetes mellitus with obesity (Multi)    Relevant Medications    tirzepatide (Mounjaro) 10 mg/0.5 mL pen injector    Other Relevant Orders    Follow Up In Clinical Pharmacy    Migraines    Relevant Orders    Follow Up In Clinical Pharmacy       Diabetes:   Patient's diabetes is controlled with A1c of 6.5%.     Patient's blood sugars are well at goal. 99% in target range. She is having some lows here and there. Also did not notice much weight loss with 7.5 mg of Mounjaro. Today we will stop the Basaglar and increase Mounjaro to 10 mg. She would like her BG numbers to be between  in the mornings and < 140 2 hours post-prandial. Her mother  early due to complications of diabetes.  Her goal is to also have bariatric surgery in the future.    PLAN:  - INCREASE Mounjaro to 10 mg  - STOP Basaglar  - CONTINUE all other DM medications    Migraines:  She is doing well on Emgality. She still gets some headaches during the week but they are something she can now ignore./less severe. 1 time a week it will get to the point where she will need pain medication/compazine which is an improvement to where she was before. At this time she would like to continue this medication.    PLAN:  - CONTINUE Emgality    Follow up with clinical pharmacist: 10/9/24 @ 10:30   Follow up with PCP: 10/21/24    Thank you,  Coleen Zazueta, PharmD  Clinical Pharmacy Specialist - Primary Care  [phone]  [email or web address]      Continue all meds under the continuation of care with the referring provider and clinical pharmacy team.

## 2024-09-11 ENCOUNTER — APPOINTMENT (OUTPATIENT)
Dept: PHARMACY | Facility: HOSPITAL | Age: 59
End: 2024-09-11
Payer: COMMERCIAL

## 2024-09-11 DIAGNOSIS — E66.9 TYPE 2 DIABETES MELLITUS WITH OBESITY (MULTI): ICD-10-CM

## 2024-09-11 DIAGNOSIS — E11.69 TYPE 2 DIABETES MELLITUS WITH OBESITY (MULTI): ICD-10-CM

## 2024-09-11 DIAGNOSIS — G43.719 INTRACTABLE CHRONIC MIGRAINE WITHOUT AURA AND WITHOUT STATUS MIGRAINOSUS: ICD-10-CM

## 2024-09-11 PROCEDURE — RXMED WILLOW AMBULATORY MEDICATION CHARGE

## 2024-09-11 RX ORDER — TIRZEPATIDE 10 MG/.5ML
10 INJECTION, SOLUTION SUBCUTANEOUS
Qty: 2 ML | Refills: 1 | Status: SHIPPED | OUTPATIENT
Start: 2024-09-11

## 2024-09-12 ENCOUNTER — PHARMACY VISIT (OUTPATIENT)
Dept: PHARMACY | Facility: CLINIC | Age: 59
End: 2024-09-12
Payer: MEDICARE

## 2024-10-03 PROCEDURE — RXMED WILLOW AMBULATORY MEDICATION CHARGE

## 2024-10-07 ENCOUNTER — TELEPHONE (OUTPATIENT)
Dept: PRIMARY CARE | Facility: CLINIC | Age: 59
End: 2024-10-07
Payer: COMMERCIAL

## 2024-10-07 DIAGNOSIS — G43.719 INTRACTABLE CHRONIC MIGRAINE WITHOUT AURA AND WITHOUT STATUS MIGRAINOSUS: ICD-10-CM

## 2024-10-07 RX ORDER — HYDROCODONE BITARTRATE AND ACETAMINOPHEN 5; 325 MG/1; MG/1
1 TABLET ORAL 2 TIMES DAILY PRN
Qty: 20 TABLET | Refills: 0 | Status: SHIPPED | OUTPATIENT
Start: 2024-10-07

## 2024-10-08 PROCEDURE — RXMED WILLOW AMBULATORY MEDICATION CHARGE

## 2024-10-08 NOTE — PROGRESS NOTES
Pharmacist Clinic: Diabetes Management  Mary Jean Baptiste is a 59 y.o. female was referred to Clinical Pharmacy Team for diabetes management.   Referring Provider: Kimani Li MD   - Last visit 5/2/24    Subjective     HPI    DIABETES     - Patient gets cortisone shots into right knee    Current Pharmacotherapy:  - Mounjaro 10 mg weekly - Thursdays  - Farxiga 10 mg with breakfast   - Metformin XR 1000 mg BID  - Novolog 5 units PRN if glucose > 200   - Has not used in a couple months    Current diet:   - Significant reduction in cravings/appetite; it wears off at end of week for each injection  - Overall tries to watch carb/starch intake  - B = protein shakes, protein shakes   - Bariatric program at [facility]   - Feeling hall faster/decrease in cravings  - Feels weight loss is slowing down    Exercise:  - Walks 3 x a week    Weight loss:   - Current weight: 194 lbs   - Weight last appt: 199 lbs  - Baseline weight: 212 lbs    Current monitoring regimen:   Patient is using: glucometer; freestyle vika 3 - connected via [employer]    Reported blood sugars:   See APG Report below    Any episodes of hypoglycemia? Yes  - Had 1 but no symptoms  - Vaguely remembers    Adverse Effects: None (nauseous all the time but something she has always had)            MIGRAINES  - Previously saw neurology but hasn't seen in a couple years  - Discussed injectable biologics in the past but neuro wanted to hold off; can't recall why  - Emgality PA approved    Current Pharmacotherapy:  - Emgality 240 mg x 1 dose then 120 mg once monthly   - Started August 5    Frequency:  - Week of low grade migraine, took tordol   - Less intense, has 1 intense one once a week or every other     Current pharmacotherapy:  - None; just pain medication + compazine     Historical pharmacotherapy:  - Qulipta - made her feel tired and had a low grade headache all the time; since stopping she feels better; tried for a month   - Nurtec ODT 75 mg every other  "day = did not work; tried 12/7/23-3/7/24  - Sumatriptan  - Topamax 300 mg twice daily (worked, but she felt like she started to have memory issues with this - stopped about a year ago)   - Propranolol (took in her 20's; now on atenolol)  - Botox    Allergies   Allergen Reactions    Azithromycin Other    Erythromycin Unknown    Fentanyl Unknown and Other     Other reaction(s): doesnt work, Other (See Comments), Unknown   NON THERAPUTIC    NON THERAPUTIC    Icosapent Ethyl Unknown    Levofloxacin Unknown    Metronidazole Unknown    Ondansetron Hcl Unknown    Penicillins Unknown    Erythromycin Base Rash    Iodine Rash and Unknown     Other reaction(s): Other (See Comments), Unknown   Chemical peritonitis    Chemical peritonitis    Lisinopril Rash       Objective     There were no vitals taken for this visit.    Historical Pharmacotherapy  Victoza 1.2 mg daily  Novolog SS (hasn't needed to use)    SECONDARY PREVENTION  - Statin? Yes   - ACE-I/ARB? Yes  - Aspirin? Yes    Pertinent PMH Review:  - PMH of Pancreatitis: No  - PMH of Retinopathy: No  - PMH of Urinary Tract Infections: No  - PMH of MTC: No    Lab Review  Lab Results   Component Value Date    BILITOT 0.4 02/12/2021    CALCIUM 9.4 02/12/2021    CO2 26 02/12/2021     02/12/2021    CREATININE 0.80 02/12/2021    GLUCOSE 134 (H) 02/12/2021    ALKPHOS 59 02/12/2021    K 4.2 02/12/2021    PROT 6.8 02/12/2021     02/12/2021    AST 14 02/12/2021    ALT 21 02/12/2021    BUN 20 02/12/2021    ANIONGAP 11 02/12/2021    ALBUMIN 4.3 02/12/2021    AMYLASE 32 02/12/2021    LIPASE 43 02/12/2021     No results found for: \"TRIG\", \"CHOL\", \"LDL\", \"HDL\"  Lab Results   Component Value Date    HGBA1C 6.5 (H) 06/17/2024    HGBA1C 6.9 (H) 04/30/2024    HGBA1C 7.3 (A) 04/11/2024     The ASCVD Risk score (Esperanza DK, et al., 2019) failed to calculate for the following reasons:    Cannot find a previous HDL lab    Cannot find a previous total cholesterol lab    Drug " Interactions:  None    Assessment/Plan   Problem List Items Addressed This Visit       Type 2 diabetes mellitus with obesity (Multi)    Relevant Medications    tirzepatide (Mounjaro) 10 mg/0.5 mL pen injector    Other Relevant Orders    Referral to Clinical Pharmacy    Migraines     Diabetes:   Patient's diabetes is controlled with A1c of 6.5%.     Patient's goal is to have bariatric surgery in future. Weight loss is steady. Blood sugars are phenomenal. There are some nights that she is having a low. Due to her running in the 80's at night, we can pull back on her metformin to 2 tablets in the morning and 1 tablet at night. We will also completely stop her PRN Novolog as she is very well controlled. She is also due for A1c. Will follow up in 2 months.     PLAN:  - DECREASE Metformin XR 2 tablets in the morning, 1 tablet at night  - STOP Novolog  - CONTINUE all other DM medications    Migraines:  States that she has a low grade headache practically every day. Takes tordol for this. Usually only has 1 intense headache a week. I will refer her back to Dr. SHAH for migraine management. Also told her she may benefit from seeing a specialist again.    PLAN:  - CONTINUE Emgality  - Follow up with Dr. SHAH    Follow up with clinical pharmacist: 12/4/24 @ 10:30   Follow up with PCP: 10/21/24    Thank you,  Coleen Zazueta, PharmD  Clinical Pharmacy Specialist - Primary Care  [phone]  [email or web address]      Continue all meds under the continuation of care with the referring provider and clinical pharmacy team.

## 2024-10-09 ENCOUNTER — APPOINTMENT (OUTPATIENT)
Dept: PHARMACY | Facility: HOSPITAL | Age: 59
End: 2024-10-09
Payer: COMMERCIAL

## 2024-10-09 DIAGNOSIS — G43.719 INTRACTABLE CHRONIC MIGRAINE WITHOUT AURA AND WITHOUT STATUS MIGRAINOSUS: ICD-10-CM

## 2024-10-09 DIAGNOSIS — E66.9 TYPE 2 DIABETES MELLITUS WITH OBESITY (MULTI): ICD-10-CM

## 2024-10-09 DIAGNOSIS — E11.69 TYPE 2 DIABETES MELLITUS WITH OBESITY (MULTI): ICD-10-CM

## 2024-10-09 RX ORDER — TIRZEPATIDE 10 MG/.5ML
10 INJECTION, SOLUTION SUBCUTANEOUS
Qty: 2 ML | Refills: 2 | Status: SHIPPED | OUTPATIENT
Start: 2024-10-09

## 2024-10-10 ENCOUNTER — PHARMACY VISIT (OUTPATIENT)
Dept: PHARMACY | Facility: CLINIC | Age: 59
End: 2024-10-10
Payer: MEDICARE

## 2024-10-10 ENCOUNTER — LAB (OUTPATIENT)
Dept: LAB | Facility: LAB | Age: 59
End: 2024-10-10
Payer: COMMERCIAL

## 2024-10-10 DIAGNOSIS — G43.711 INTRACTABLE CHRONIC MIGRAINE WITHOUT AURA AND WITH STATUS MIGRAINOSUS: ICD-10-CM

## 2024-10-10 DIAGNOSIS — G43.719 INTRACTABLE CHRONIC MIGRAINE WITHOUT AURA AND WITHOUT STATUS MIGRAINOSUS: ICD-10-CM

## 2024-10-10 DIAGNOSIS — E03.9 ACQUIRED HYPOTHYROIDISM: ICD-10-CM

## 2024-10-10 DIAGNOSIS — E78.2 MIXED HYPERLIPIDEMIA: ICD-10-CM

## 2024-10-10 DIAGNOSIS — E11.69 TYPE 2 DIABETES MELLITUS WITH OBESITY (MULTI): ICD-10-CM

## 2024-10-10 DIAGNOSIS — I10 ESSENTIAL HYPERTENSION: ICD-10-CM

## 2024-10-10 DIAGNOSIS — E66.9 TYPE 2 DIABETES MELLITUS WITH OBESITY (MULTI): ICD-10-CM

## 2024-10-10 DIAGNOSIS — G43.809 HEADACHE, VARIANT MIGRAINE: ICD-10-CM

## 2024-10-10 DIAGNOSIS — J44.9 MILD CHRONIC OBSTRUCTIVE PULMONARY DISEASE (MULTI): ICD-10-CM

## 2024-10-10 LAB
ALBUMIN SERPL BCP-MCNC: 4.3 G/DL (ref 3.4–5)
ALP SERPL-CCNC: 58 U/L (ref 33–110)
ALT SERPL W P-5'-P-CCNC: 16 U/L (ref 7–45)
ANION GAP SERPL CALC-SCNC: 12 MMOL/L (ref 10–20)
AST SERPL W P-5'-P-CCNC: 16 U/L (ref 9–39)
BASOPHILS # BLD AUTO: 0.07 X10*3/UL (ref 0–0.1)
BASOPHILS NFR BLD AUTO: 1.1 %
BILIRUB SERPL-MCNC: 0.3 MG/DL (ref 0–1.2)
BUN SERPL-MCNC: 19 MG/DL (ref 6–23)
CALCIUM SERPL-MCNC: 9.3 MG/DL (ref 8.6–10.3)
CHLORIDE SERPL-SCNC: 102 MMOL/L (ref 98–107)
CHOLEST SERPL-MCNC: 176 MG/DL (ref 0–199)
CHOLESTEROL/HDL RATIO: 3.1
CO2 SERPL-SCNC: 29 MMOL/L (ref 21–32)
CREAT SERPL-MCNC: 0.78 MG/DL (ref 0.5–1.05)
CREAT UR-MCNC: 32.1 MG/DL (ref 20–320)
EGFRCR SERPLBLD CKD-EPI 2021: 88 ML/MIN/1.73M*2
EOSINOPHIL # BLD AUTO: 0.09 X10*3/UL (ref 0–0.7)
EOSINOPHIL NFR BLD AUTO: 1.4 %
ERYTHROCYTE [DISTWIDTH] IN BLOOD BY AUTOMATED COUNT: 15.2 % (ref 11.5–14.5)
GLUCOSE SERPL-MCNC: 102 MG/DL (ref 74–99)
HCT VFR BLD AUTO: 46.7 % (ref 36–46)
HDLC SERPL-MCNC: 56.8 MG/DL
HGB BLD-MCNC: 14.2 G/DL (ref 12–16)
IMM GRANULOCYTES # BLD AUTO: 0.02 X10*3/UL (ref 0–0.7)
IMM GRANULOCYTES NFR BLD AUTO: 0.3 % (ref 0–0.9)
LDLC SERPL CALC-MCNC: 101 MG/DL
LYMPHOCYTES # BLD AUTO: 1.99 X10*3/UL (ref 1.2–4.8)
LYMPHOCYTES NFR BLD AUTO: 31.8 %
MCH RBC QN AUTO: 26.9 PG (ref 26–34)
MCHC RBC AUTO-ENTMCNC: 30.4 G/DL (ref 32–36)
MCV RBC AUTO: 89 FL (ref 80–100)
MICROALBUMIN UR-MCNC: <7 MG/L
MICROALBUMIN/CREAT UR: NORMAL MG/G{CREAT}
MONOCYTES # BLD AUTO: 0.45 X10*3/UL (ref 0.1–1)
MONOCYTES NFR BLD AUTO: 7.2 %
NEUTROPHILS # BLD AUTO: 3.64 X10*3/UL (ref 1.2–7.7)
NEUTROPHILS NFR BLD AUTO: 58.2 %
NON HDL CHOLESTEROL: 119 MG/DL (ref 0–149)
NRBC BLD-RTO: 0 /100 WBCS (ref 0–0)
PLATELET # BLD AUTO: 242 X10*3/UL (ref 150–450)
POTASSIUM SERPL-SCNC: 4.5 MMOL/L (ref 3.5–5.3)
PROT SERPL-MCNC: 6.6 G/DL (ref 6.4–8.2)
RBC # BLD AUTO: 5.27 X10*6/UL (ref 4–5.2)
SODIUM SERPL-SCNC: 138 MMOL/L (ref 136–145)
TRIGL SERPL-MCNC: 91 MG/DL (ref 0–149)
TSH SERPL-ACNC: 1.13 MIU/L (ref 0.44–3.98)
VLDL: 18 MG/DL (ref 0–40)
WBC # BLD AUTO: 6.3 X10*3/UL (ref 4.4–11.3)

## 2024-10-10 PROCEDURE — 82043 UR ALBUMIN QUANTITATIVE: CPT

## 2024-10-10 PROCEDURE — 80061 LIPID PANEL: CPT

## 2024-10-10 PROCEDURE — 80053 COMPREHEN METABOLIC PANEL: CPT

## 2024-10-10 PROCEDURE — RXMED WILLOW AMBULATORY MEDICATION CHARGE

## 2024-10-10 PROCEDURE — 83036 HEMOGLOBIN GLYCOSYLATED A1C: CPT

## 2024-10-10 PROCEDURE — 85025 COMPLETE CBC W/AUTO DIFF WBC: CPT

## 2024-10-10 PROCEDURE — 82570 ASSAY OF URINE CREATININE: CPT

## 2024-10-10 PROCEDURE — 36415 COLL VENOUS BLD VENIPUNCTURE: CPT

## 2024-10-10 PROCEDURE — 84443 ASSAY THYROID STIM HORMONE: CPT

## 2024-10-11 LAB
EST. AVERAGE GLUCOSE BLD GHB EST-MCNC: 131 MG/DL
HBA1C MFR BLD: 6.2 %

## 2024-10-21 ENCOUNTER — APPOINTMENT (OUTPATIENT)
Dept: PRIMARY CARE | Facility: CLINIC | Age: 59
End: 2024-10-21
Payer: COMMERCIAL

## 2024-10-21 VITALS
HEIGHT: 63 IN | DIASTOLIC BLOOD PRESSURE: 62 MMHG | TEMPERATURE: 90.9 F | BODY MASS INDEX: 34.07 KG/M2 | WEIGHT: 192.3 LBS | HEART RATE: 74 BPM | RESPIRATION RATE: 15 BRPM | OXYGEN SATURATION: 95 % | SYSTOLIC BLOOD PRESSURE: 98 MMHG

## 2024-10-21 DIAGNOSIS — R35.0 FREQUENT URINATION: ICD-10-CM

## 2024-10-21 DIAGNOSIS — E03.9 ACQUIRED HYPOTHYROIDISM: ICD-10-CM

## 2024-10-21 DIAGNOSIS — F41.9 ANXIETY AND DEPRESSION: ICD-10-CM

## 2024-10-21 DIAGNOSIS — E78.2 MIXED HYPERLIPIDEMIA: ICD-10-CM

## 2024-10-21 DIAGNOSIS — E11.69 TYPE 2 DIABETES MELLITUS WITH OBESITY (MULTI): ICD-10-CM

## 2024-10-21 DIAGNOSIS — F32.A ANXIETY AND DEPRESSION: ICD-10-CM

## 2024-10-21 DIAGNOSIS — G43.809 HEADACHE, VARIANT MIGRAINE: ICD-10-CM

## 2024-10-21 DIAGNOSIS — E55.9 VITAMIN D DEFICIENCY: ICD-10-CM

## 2024-10-21 DIAGNOSIS — I10 ESSENTIAL HYPERTENSION: Primary | ICD-10-CM

## 2024-10-21 DIAGNOSIS — E66.9 TYPE 2 DIABETES MELLITUS WITH OBESITY (MULTI): ICD-10-CM

## 2024-10-21 DIAGNOSIS — J45.909 ASTHMA, UNSPECIFIED ASTHMA SEVERITY, UNSPECIFIED WHETHER COMPLICATED, UNSPECIFIED WHETHER PERSISTENT (HHS-HCC): ICD-10-CM

## 2024-10-21 PROCEDURE — 1036F TOBACCO NON-USER: CPT | Performed by: INTERNAL MEDICINE

## 2024-10-21 PROCEDURE — 4010F ACE/ARB THERAPY RXD/TAKEN: CPT | Performed by: INTERNAL MEDICINE

## 2024-10-21 PROCEDURE — 3044F HG A1C LEVEL LT 7.0%: CPT | Performed by: INTERNAL MEDICINE

## 2024-10-21 PROCEDURE — 3049F LDL-C 100-129 MG/DL: CPT | Performed by: INTERNAL MEDICINE

## 2024-10-21 PROCEDURE — 3078F DIAST BP <80 MM HG: CPT | Performed by: INTERNAL MEDICINE

## 2024-10-21 PROCEDURE — 99214 OFFICE O/P EST MOD 30 MIN: CPT | Performed by: INTERNAL MEDICINE

## 2024-10-21 PROCEDURE — 3008F BODY MASS INDEX DOCD: CPT | Performed by: INTERNAL MEDICINE

## 2024-10-21 PROCEDURE — 3074F SYST BP LT 130 MM HG: CPT | Performed by: INTERNAL MEDICINE

## 2024-10-21 PROCEDURE — 3062F POS MACROALBUMINURIA REV: CPT | Performed by: INTERNAL MEDICINE

## 2024-10-21 RX ORDER — ATORVASTATIN CALCIUM 40 MG/1
40 TABLET, FILM COATED ORAL DAILY
Qty: 90 TABLET | Refills: 2 | Status: SHIPPED | OUTPATIENT
Start: 2024-10-21 | End: 2025-04-19

## 2024-10-21 RX ORDER — OXYBUTYNIN CHLORIDE 5 MG/1
5 TABLET, EXTENDED RELEASE ORAL DAILY
Qty: 90 TABLET | Refills: 1 | Status: SHIPPED | OUTPATIENT
Start: 2024-10-21 | End: 2025-10-21

## 2024-10-21 RX ORDER — FLUOXETINE 10 MG/1
10 CAPSULE ORAL DAILY
Qty: 90 CAPSULE | Refills: 1 | Status: SHIPPED | OUTPATIENT
Start: 2024-10-21 | End: 2025-04-19

## 2024-10-21 SDOH — ECONOMIC STABILITY: FOOD INSECURITY: WITHIN THE PAST 12 MONTHS, THE FOOD YOU BOUGHT JUST DIDN'T LAST AND YOU DIDN'T HAVE MONEY TO GET MORE.: NEVER TRUE

## 2024-10-21 SDOH — ECONOMIC STABILITY: FOOD INSECURITY: WITHIN THE PAST 12 MONTHS, YOU WORRIED THAT YOUR FOOD WOULD RUN OUT BEFORE YOU GOT MONEY TO BUY MORE.: NEVER TRUE

## 2024-10-21 ASSESSMENT — LIFESTYLE VARIABLES
HOW OFTEN DO YOU HAVE A DRINK CONTAINING ALCOHOL: NEVER
HOW MANY STANDARD DRINKS CONTAINING ALCOHOL DO YOU HAVE ON A TYPICAL DAY: PATIENT DOES NOT DRINK
HOW OFTEN DO YOU HAVE SIX OR MORE DRINKS ON ONE OCCASION: NEVER
SKIP TO QUESTIONS 9-10: 1
AUDIT-C TOTAL SCORE: 0

## 2024-10-21 ASSESSMENT — PATIENT HEALTH QUESTIONNAIRE - PHQ9
SUM OF ALL RESPONSES TO PHQ9 QUESTIONS 1 & 2: 0
2. FEELING DOWN, DEPRESSED OR HOPELESS: NOT AT ALL
1. LITTLE INTEREST OR PLEASURE IN DOING THINGS: NOT AT ALL

## 2024-10-21 ASSESSMENT — PAIN SCALES - GENERAL: PAINLEVEL_OUTOF10: 5

## 2024-10-21 NOTE — ASSESSMENT & PLAN NOTE
Continue Emgality, will continue meds as needed. I have personally reviewed the OARRS report.  This report is scanned into the electronic medical record.  I have considered the risks of abuse, dependence, addiction and diversion.  I believe that it is clinically appropriate to prescribe this medication. Kimani Li MD

## 2024-10-21 NOTE — ASSESSMENT & PLAN NOTE
Patient no longer takes insulin, she has had low glucoses, she would like to decrease the dose of Farxiga, will decrease the dose to 5 mg daily, see if this is helpful. Patient urinates every 2-4 hours at night, trial of oxybutynin is ordered.

## 2024-10-21 NOTE — PROGRESS NOTES
"Chief Complaint/HPI:    stressors at home: home issues have improved, she has been stressed, daughter is moved out of home.     Anxiety/ stressors: patient takes Vistaril for anxiety, she  feels better now, she  takes bupropion 150 mg now, stopped the fluoxetine, her pharmacy closed. She felt better on fluoxetine, she would like to resume this. Patient did go though psychological evaluation for bariatrics, she states.      HA: HA is  having low grade HA since on Emgality, she gets a severe HA about once weekly,  I have personally reviewed the OARRS report. This report is scanned into the electronic medical record. I have considered the risks of abuse, dependence, addiction and diversion. I believe that it is clinically appropriate to prescribe this medication. Patient uses Norco as needed. She also uses Toradol IM, Phenergan and dexamethasone, Nyrtec was not effective She uses Toradol up to twice weekly. Patient did receive Emgality for treatment of HA      Obesity: patient went to [facility] bariatrics in [place]. Patient did initially lose weight, she has had stressors due to issues with daughter recently, patient prefers medical management to surgical management of obesity, patient has now stopped going to bariatrics, she did start Mounjaro, she is taking 10 mg dose daily, it does help with weight loss, she has lost weight, she gets exhausted.  She sees [facility] for assessment. Patient still takes aldactone, she has noted increased swelling recently of the legs      reactive airways: patient sees allergist, Dr Adame     back pain: still has some back pain      \"central sleep apnea\": patient had sleep study per cardiology and pulmonology, she now uses CPAP now     DM type 2: Glucoses have been better, Hgb A1C is 6.2, patient no longer takes insulin. She takes Farxiga, Mounjaro , and low dose metformin. Glucoses have been low at night, average glucose is 101.     breast lesion: patient still sees breast " specialist at [facility], this is not changed     hypothyroidism : patient takes levothyroxine 88 mcg daily           ROS otherwise negative aside from what was mentioned above in HPI.      Patient Active Problem List   Diagnosis    Abdominal pain, acute, right upper quadrant    Acute viral syndrome    Allergies    Anxiety and depression    Atypical chest pain    Breast mass, right    Bronchiectasis    Chest heaviness    Chronic headache    Cramp in muscle    Diverticulosis of large intestine    Type 2 diabetes mellitus with obesity (Multi)    Eruption due to drug    Essential hypertension    Frequent urination    Gastroenteritis, acute    Headache, variant migraine    Hematoma of right lower extremity    Hypothyroidism    Metabolic syndrome X    Migraines    Nausea in adult    Neuropathy    Multiple lung nodules on CT    Obesity with body mass index (BMI) of 30.0 to 39.9    Occupational asthma (HHS-HCC)    Mixed hyperlipidemia    Palpitations    Postmenopausal bone loss    Productive cough    Tonsil stone    Tonsillitis    Vitamin D insufficiency    Anxiety    Atherosclerosis of coronary artery    Chronic nonalcoholic liver disease    COPD (chronic obstructive pulmonary disease) (Multi)    Daytime sleepiness    Diabetes mellitus (Multi)    Diverticulitis    Fibrocystic breast changes of both breasts    GERD (gastroesophageal reflux disease)    History of depression    History of hypertension    Mastodynia of right breast    PLATA (dyspnea on exertion)    Shortness of breath at rest    Asthma    Sleep apnea    Solitary cyst of left breast    Hematoma of lower extremity    Vitamin D deficiency    Hypercholesteremia    Mild chronic obstructive pulmonary disease (Multi)    Malaise and fatigue    History of right breast biopsy    Morbid obesity, unspecified obesity type (Multi)         Past Medical History:   Diagnosis Date    ADHD (attention deficit hyperactivity disorder)     Anxiety     Asthma     Cataract     COPD (chronic  obstructive pulmonary disease) (Multi)     Depression     Diabetes mellitus (Multi)     Disease of thyroid gland     Eating disorder     Eczema     GERD (gastroesophageal reflux disease)     Headache     Hypertension     Personal history of other diseases of the circulatory system     History of hypertension    Personal history of other diseases of the female genital tract     History of polycystic ovarian syndrome    Personal history of other diseases of the respiratory system     History of asthma    Personal history of other mental and behavioral disorders     History of depression     Past Surgical History:   Procedure Laterality Date    BREAST SURGERY  2005    CARDIAC CATHETERIZATION  2004    EYE SURGERY      HERNIA REPAIR  1965,2002    HYSTERECTOMY  2000    OTHER SURGICAL HISTORY  10/25/2019    Hysterectomy    OTHER SURGICAL HISTORY  10/25/2019    Turbinectomy    SINUS SURGERY      WISDOM TOOTH EXTRACTION  1981     Social History     Social History Narrative    Not on file         ALLERGIES  Azithromycin, Erythromycin, Fentanyl, Icosapent ethyl, Levofloxacin, Metronidazole, Ondansetron hcl, Penicillins, Erythromycin base, Iodine, and Lisinopril      MEDICATIONS  Current Outpatient Medications on File Prior to Visit   Medication Sig Dispense Refill    albuterol 2.5 mg /3 mL (0.083 %) nebulizer solution Take 3 mL (2.5 mg) by nebulization 4 times a day as needed for wheezing or shortness of breath. 360 mL 11    aspirin 81 mg EC tablet Take by mouth.      atenolol (Tenormin) 50 mg tablet Take 1 tablet (50 mg) by mouth 2 times a day. 180 tablet 1    buPROPion XL (Wellbutrin XL) 150 mg 24 hr tablet Take 1 tablet (150 mg) by mouth once daily. 90 tablet 1    cholecalciferol (Vitamin D-3) 50 mcg (2,000 unit) capsule Take 1 capsule (50 mcg) by mouth once daily.      dapagliflozin propanediol (Farxiga) 10 mg Take 1 tablet (10 mg) by mouth once daily in the morning. Take before meals. 90 tablet 1    dexAMETHasone  "(Decadron) 0.75 mg tablet Take 2 tablets (1.5 mg) by mouth 2 times a day with meals. 360 tablet 3    fenofibrate (Triglide) 160 mg tablet Take 1 tablet (160 mg) by mouth once daily. 90 tablet 1    fluticasone furoate-vilanteroL (Breo Elipta) 200-25 mcg/dose inhaler Inhale 1 puff once daily.      FreeStyle Davey 3 Sensor device use as directed AND CHANGE EVERY 14 DAYS      galcanezumab (Emgality Pen) 120 mg/mL auto-injector Inject 240 mg (two 120 mg injections) as a single loading dose, then inject 120 mg once monthly thereafter as directed. 2 mL 5    HYDROcodone-acetaminophen (Norco) 5-325 mg tablet Take 1 tablet by mouth 2 times a day as needed for severe pain (7 - 10). 20 tablet 0    hydrOXYzine pamoate (VistariL) 25 mg capsule Take 1 capsule (25 mg) by mouth 3 times a day as needed for anxiety. 90 capsule 3    ketorolac 30 mg/mL (1 mL) injection inject 1 milliliter intramuscularly daily if needed for migraine, do not use more than 2 consecutive days 10 mL 2    levothyroxine (Synthroid, Levoxyl) 88 mcg tablet Take 1 tablet (88 mcg) by mouth once daily. 90 tablet 1    losartan (Cozaar) 100 mg tablet Take 1 tablet (100 mg) by mouth once daily. 90 tablet 1    magnesium oxide 500 mg tablet Take 1 tablet (500 mg) by mouth 2 times a day.      metFORMIN  mg 24 hr tablet Take 2 tablets (1,000 mg) by mouth 2 times a day. 360 tablet 1    naloxone (Narcan) 4 mg/0.1 mL nasal spray Administer into affected nostril(s).      omeprazole (PriLOSEC) 40 mg DR capsule Take 1 capsule (40 mg) by mouth once daily in the morning. Take before meals. 90 capsule 1    pen needle, diabetic (Droplet Pen Needle) 32 gauge x 5/32\" needle Inject 1 Pen needle under the skin if needed (with insulin injections). 200 each 5    promethazine (Phenergan) 25 mg tablet Take 1 tablet (25 mg) by mouth 3 times a day as needed for nausea or vomiting. 30 tablet 3    Qvar RediHaler 80 mcg/actuation inhaler Inhale 2 Inhalations 2 times a day.      sodium " "fluoride-pot nitrate 1.1-5 % paste BRUSH TWICE A DAY FOR 2 MINUTES AND DO NTO EAT OR DRINK FOR 1 HOUR AFTER BRUSHING      spironolactone (Aldactone) 25 mg tablet take 1/2 tablet by mouth once daily 45 tablet 1    syringe with needle, safety (BD Integra Syringe) 3 mL 25 gauge x 1\" syringe Inject 1 each as directed 1 time if needed (headache) for up to 1 dose. 10 each 3    tiotropium (Spiriva Respimat) 2.5 mcg/actuation inhaler Inhale 2 puffs once daily.      tirzepatide (Mounjaro) 10 mg/0.5 mL pen injector Inject 10 mg under the skin every 7 days. 2 mL 2    [DISCONTINUED] atorvastatin (Lipitor) 40 mg tablet Take 1 tablet (40 mg) by mouth once daily. as directed 90 tablet 1    b complex vitamins capsule Take 1 capsule by mouth 2 times a day. (Patient not taking: Reported on 10/21/2024)      OneTouch Ultra Test strip OneTouch Ultra Blue STRP   Refills: 0        Start : 18-Dec-2018   Active (Patient not taking: Reported on 10/21/2024)      [DISCONTINUED] amLODIPine (Norvasc) 5 mg tablet Take 1 tablet (5 mg) by mouth once daily. as directed (Patient not taking: Reported on 10/21/2024) 90 tablet 1    [DISCONTINUED] atogepant (Qulipta) 60 mg tablet tablet Take 1 tablet (60 mg) by mouth once daily. (Patient not taking: Reported on 10/21/2024) 30 tablet 5    [DISCONTINUED] FLUoxetine (PROzac) 20 mg capsule Take 1 capsule (20 mg) by mouth once daily. (Patient not taking: Reported on 10/21/2024) 90 capsule 1     No current facility-administered medications on file prior to visit.         PHYSICAL EXAM  BP 98/62 (BP Location: Left arm, Patient Position: Sitting, BP Cuff Size: Large adult)   Pulse 74   Temp (!) 32.7 °C (90.9 °F) (Temporal)   Resp 15   Ht 1.6 m (5' 3\")   Wt 87.2 kg (192 lb 4.8 oz)   SpO2 95%   BMI 34.06 kg/m²   Body mass index is 34.06 kg/m².    Constitutional   General appearance:  well developed, appears healthy, well nourished, overweight, she has lost significant weight. pleasant female, NAD.     Eyes "   Inspection of eyes: Sclera and conjunctiva were normal.  Wears glasses  Ears, Nose, Mouth, and Throat   Ears: Auricles: Normal.  Neck is supple, no bruits , no masses, no thyromegaly is appreciated  Pulmonary   Respiratory assessment: No respiratory distress, normal respiratory rhythm and effort.    Auscultation of Lungs:  lungs are clear,  no diminished breath sounds, occasional cough is noted.  Cardiovascular   Auscultation of heart: Apical pulse normal, heart rate and rhythm normal, normal S1 and S2, no murmurs and no pericardial rub.    Exam for edema: no edema is noted today  Lymphatic   Palpation of lymph nodes in neck: No cervical lymphadenopathy.   Neurologic   Cranial nerves: Nerves 2-12 were intact, no focal neuro defects.    Psychiatric   Orientation: Oriented to person, place, and time.    Mood and affect: Normal.     ASSESSMENT/PLAN  Problem List Items Addressed This Visit       Anxiety and depression    Relevant Medications    FLUoxetine (PROzac) 10 mg capsule    Other Relevant Orders    CBC and Auto Differential    Comprehensive Metabolic Panel    Asthma    Current Assessment & Plan     Patient is stable, no changes for now         Relevant Orders    CBC and Auto Differential    Comprehensive Metabolic Panel    Essential hypertension - Primary    Current Assessment & Plan     BP is low , she continues to lose weight, amlodipine has been discontinued         Relevant Orders    CBC and Auto Differential    Comprehensive Metabolic Panel    Albumin-Creatinine Ratio, Urine Random    Frequent urination    Relevant Medications    oxybutynin XL (Ditropan XL) 5 mg 24 hr tablet    Other Relevant Orders    CBC and Auto Differential    Comprehensive Metabolic Panel    Headache, variant migraine    Current Assessment & Plan     Continue Emgality, will continue meds as needed. I have personally reviewed the OARRS report.  This report is scanned into the electronic medical record.  I have considered the risks of  abuse, dependence, addiction and diversion.  I believe that it is clinically appropriate to prescribe this medication. Kimani Li MD          Relevant Orders    CBC and Auto Differential    Comprehensive Metabolic Panel    Hypothyroidism    Relevant Orders    TSH with reflex to Free T4 if abnormal    Mixed hyperlipidemia    Current Assessment & Plan     Needs refill of atorvastatin, will order, recheck labs in 3 months         Relevant Medications    atorvastatin (Lipitor) 40 mg tablet    Other Relevant Orders    CBC and Auto Differential    Comprehensive Metabolic Panel    Lipid Panel    Type 2 diabetes mellitus with obesity (Multi)    Current Assessment & Plan     Patient no longer takes insulin, she has had low glucoses, she would like to decrease the dose of Farxiga, will decrease the dose to 5 mg daily, see if this is helpful. Patient urinates every 2-4 hours at night, trial of oxybutynin is ordered.           Relevant Orders    CBC and Auto Differential    Comprehensive Metabolic Panel    Hemoglobin A1C    Albumin-Creatinine Ratio, Urine Random    Vitamin D deficiency    Relevant Orders    Vitamin D 25-Hydroxy,Total (for eval of Vitamin D levels)     Check labs in 3-4 months, follow up in 3 months due to use of Norco     Kimani Li MD

## 2024-10-30 DIAGNOSIS — I10 ESSENTIAL HYPERTENSION: ICD-10-CM

## 2024-10-30 RX ORDER — ATENOLOL 50 MG/1
50 TABLET ORAL 2 TIMES DAILY
Qty: 180 TABLET | Refills: 1 | Status: SHIPPED | OUTPATIENT
Start: 2024-10-30

## 2024-10-31 PROCEDURE — RXMED WILLOW AMBULATORY MEDICATION CHARGE

## 2024-11-04 ENCOUNTER — PHARMACY VISIT (OUTPATIENT)
Dept: PHARMACY | Facility: CLINIC | Age: 59
End: 2024-11-04
Payer: MEDICARE

## 2024-11-13 DIAGNOSIS — G43.719 INTRACTABLE CHRONIC MIGRAINE WITHOUT AURA AND WITHOUT STATUS MIGRAINOSUS: ICD-10-CM

## 2024-11-13 RX ORDER — HYDROCODONE BITARTRATE AND ACETAMINOPHEN 5; 325 MG/1; MG/1
1 TABLET ORAL 2 TIMES DAILY PRN
Qty: 20 TABLET | Refills: 0 | OUTPATIENT
Start: 2024-11-13

## 2024-11-13 NOTE — TELEPHONE ENCOUNTER
Patient states she gave you forms at her last office visit and wants to know if they have been faxed yet to [phone]

## 2024-11-15 ENCOUNTER — TELEPHONE (OUTPATIENT)
Dept: PRIMARY CARE | Facility: CLINIC | Age: 59
End: 2024-11-15
Payer: COMMERCIAL

## 2024-11-15 NOTE — TELEPHONE ENCOUNTER
Patient called and said she has been waiting since for you to sign the surgical clearance from [facility] since her last visit.  She said you had the paperwork at that time.

## 2024-11-21 ENCOUNTER — PATIENT MESSAGE (OUTPATIENT)
Dept: PRIMARY CARE | Facility: CLINIC | Age: 59
End: 2024-11-21
Payer: COMMERCIAL

## 2024-11-21 DIAGNOSIS — G43.809 HEADACHE, VARIANT MIGRAINE: Primary | ICD-10-CM

## 2024-11-21 DIAGNOSIS — G43.719 INTRACTABLE CHRONIC MIGRAINE WITHOUT AURA AND WITHOUT STATUS MIGRAINOSUS: ICD-10-CM

## 2024-11-21 DIAGNOSIS — Z79.899 HIGH RISK MEDICATION USE: ICD-10-CM

## 2024-11-21 RX ORDER — HYDROCODONE BITARTRATE AND ACETAMINOPHEN 5; 325 MG/1; MG/1
1 TABLET ORAL 2 TIMES DAILY PRN
Qty: 20 TABLET | Refills: 0 | Status: SHIPPED | OUTPATIENT
Start: 2024-11-21 | End: 2024-11-22 | Stop reason: SDUPTHER

## 2024-11-22 ENCOUNTER — TELEPHONE (OUTPATIENT)
Dept: PRIMARY CARE | Facility: CLINIC | Age: 59
End: 2024-11-22
Payer: COMMERCIAL

## 2024-11-22 DIAGNOSIS — G43.719 INTRACTABLE CHRONIC MIGRAINE WITHOUT AURA AND WITHOUT STATUS MIGRAINOSUS: ICD-10-CM

## 2024-11-22 RX ORDER — HYDROCODONE BITARTRATE AND ACETAMINOPHEN 5; 325 MG/1; MG/1
1 TABLET ORAL 2 TIMES DAILY PRN
Qty: 20 TABLET | Refills: 0 | Status: SHIPPED | OUTPATIENT
Start: 2024-11-22

## 2024-11-22 NOTE — TELEPHONE ENCOUNTER
Patient called in stating that her HYDROcodone-acetaminophen (Norco) 5-325 mg tablet was sent to  pharmacy and she lives an hour away. Patient states that she wanted this med to go to [facility] in [place] on her chart.

## 2024-11-25 ENCOUNTER — CLINICAL SUPPORT (OUTPATIENT)
Dept: PRIMARY CARE | Facility: CLINIC | Age: 59
End: 2024-11-25
Payer: COMMERCIAL

## 2024-11-25 ENCOUNTER — TELEPHONE (OUTPATIENT)
Dept: PRIMARY CARE | Facility: CLINIC | Age: 59
End: 2024-11-25

## 2024-11-25 DIAGNOSIS — G43.809 HEADACHE, VARIANT MIGRAINE: ICD-10-CM

## 2024-11-25 DIAGNOSIS — Z79.899 HIGH RISK MEDICATION USE: ICD-10-CM

## 2024-11-25 PROCEDURE — 82570 ASSAY OF URINE CREATININE: CPT

## 2024-11-25 PROCEDURE — 80373 DRUG SCREENING TRAMADOL: CPT

## 2024-11-25 PROCEDURE — 80307 DRUG TEST PRSMV CHEM ANLYZR: CPT

## 2024-11-25 PROCEDURE — 80368 SEDATIVE HYPNOTICS: CPT

## 2024-11-25 PROCEDURE — 80354 DRUG SCREENING FENTANYL: CPT

## 2024-11-25 PROCEDURE — 80346 BENZODIAZEPINES1-12: CPT

## 2024-11-25 PROCEDURE — 80365 DRUG SCREENING OXYCODONE: CPT

## 2024-11-25 PROCEDURE — 80361 OPIATES 1 OR MORE: CPT

## 2024-11-25 PROCEDURE — 80358 DRUG SCREENING METHADONE: CPT

## 2024-11-25 NOTE — TELEPHONE ENCOUNTER
Lmom for patient to let her know [facility]

## 2024-11-26 LAB
AMPHETAMINES UR QL SCN: NORMAL
BARBITURATES UR QL SCN: NORMAL
BZE UR QL SCN: NORMAL
CANNABINOIDS UR QL SCN: NORMAL
CREAT UR-MCNC: 21.6 MG/DL (ref 20–320)
PCP UR QL SCN: NORMAL

## 2024-12-02 NOTE — PROGRESS NOTES
Pharmacist Clinic: Diabetes Management  Mary Jean Baptiste is a 59 y.o. female was referred to Clinical Pharmacy Team for diabetes management.   Referring Provider: Kimani Li MD   - Last visit 5/2/24    Subjective     HPI  - Patient gets cortisone shots into right knee    Current Pharmacotherapy:  - Mounjaro 10 mg weekly - Thursdays  - Farxiga 10 mg with breakfast   - Metformin XR 1000 mg in the morning and 500 mg at night    Current diet:   - Significant reduction in cravings/appetite; it wears off at end of week for each injection  - Overall tries to watch carb/starch intake  - B = protein shakes, protein shakes   - Bariatric program at [facility]   - Feeling hall faster/decrease in cravings  - Last month did not feel has much weight loss as she wanted    Exercise:  - Walks 3 x a week  - Exercises and notices goes low after     Weight loss:   - Current weight: 182 lbs    - Weight last appt: 194 lbs  - Baseline weight: 212 lbs    Current monitoring regimen:   Patient is using: glucometer; freestyle vika 3 - connected via Replise    Reported blood sugars:   See APG Report below    Any episodes of hypoglycemia? No  - Checked with fingerstick and states they are coming back as 100 although CGM stating < 70    Adverse Effects: None                    MIGRAINES  - Previously saw neurology but hasn't seen in a couple years  - Discussed injectable biologics in the past but neuro wanted to hold off; can't recall why  - Emgality PA approved     Current Pharmacotherapy:  - Emgality 240 mg x 1 dose then 120 mg once monthly              - Started August 5     Frequency:  - Less intense, has 1 intense one once a week or every other   - Low grade headache every day (all day) - No OTC are helping with that  - Has 1 actual major headache/migraine a week (4-6 a month)    Current pharmacotherapy:  - None; just pain medication + compazine      Historical pharmacotherapy:  - Qulipta - made her feel tired and had a low grade  headache all the time; since stopping she feels better; tried for a month   - Nurtec ODT 75 mg every other day = did not work; tried 12/7/23-3/7/24  - Sumatriptan  - Topamax 300 mg twice daily (worked, but she felt like she started to have memory issues with this - stopped about a year ago)   - Propranolol (took in her 20's; now on atenolol)  - Botox    Allergies   Allergen Reactions    Azithromycin Other    Erythromycin Unknown    Fentanyl Unknown and Other     Other reaction(s): doesnt work, Other (See Comments), Unknown   NON THERAPUTIC    NON THERAPUTIC    Icosapent Ethyl Unknown    Levofloxacin Unknown    Metronidazole Unknown    Ondansetron Hcl Unknown    Penicillins Unknown    Erythromycin Base Rash    Iodine Rash and Unknown     Other reaction(s): Other (See Comments), Unknown   Chemical peritonitis    Chemical peritonitis    Lisinopril Rash       Objective     There were no vitals taken for this visit.    Historical Pharmacotherapy  Victoza 1.2 mg daily  Novolog SS (hasn't needed to use)    SECONDARY PREVENTION  - Statin? Yes   - ACE-I/ARB? Yes  - Aspirin? Yes    Pertinent PMH Review:  - PMH of Pancreatitis: No  - PMH of Retinopathy: No  - PMH of Urinary Tract Infections: No  - PMH of MTC: No    Lab Review  Lab Results   Component Value Date    BILITOT 0.3 10/10/2024    CALCIUM 9.3 10/10/2024    CO2 29 10/10/2024     10/10/2024    CREATININE 0.78 10/10/2024    GLUCOSE 102 (H) 10/10/2024    ALKPHOS 58 10/10/2024    K 4.5 10/10/2024    PROT 6.6 10/10/2024     10/10/2024    AST 16 10/10/2024    ALT 16 10/10/2024    BUN 19 10/10/2024    ANIONGAP 12 10/10/2024    ALBUMIN 4.3 10/10/2024    AMYLASE 32 02/12/2021    LIPASE 43 02/12/2021     Lab Results   Component Value Date    TRIG 91 10/10/2024    CHOL 176 10/10/2024    HDL 56.8 10/10/2024     Lab Results   Component Value Date    HGBA1C 6.2 (H) 10/10/2024    HGBA1C 6.5 (H) 06/17/2024    HGBA1C 6.9 (H) 04/30/2024     The 10-year ASCVD risk score  (Esperanza SINGLETARY, et al., 2019) is: 4%    Values used to calculate the score:      Age: 59 years      Sex: Female      Is Non- : No      Diabetic: Yes      Tobacco smoker: No      Systolic Blood Pressure: 98 mmHg      Is BP treated: Yes      HDL Cholesterol: 56.8 mg/dL      Total Cholesterol: 176 mg/dL    Drug Interactions:  None    Assessment/Plan   Problem List Items Addressed This Visit       Type 2 diabetes mellitus with obesity (Multi)    Relevant Medications    tirzepatide (Mounjaro) 12.5 mg/0.5 mL pen injector    Other Relevant Orders    Referral to Clinical Pharmacy    Migraines - Primary    Relevant Medications    fremanezumab (Ajovy) 225 mg/1.5 mL auto-injector       Diabetes:   Patient's diabetes is controlled with A1c of 6.2%.     DM:  Blood sugar numbers are looking fantastic. Looks like she was having lows but states that she checked with fingersticks and they were all > 70. As for weight loss, she feels like she has stalled. Therefore, we will increase Mounjaro to 12.5 mg to help with weight loss (as that is a major goal for her) and will decrease Metformin to just 500 mg in the morning (due to BG already being on lower end). She is still deciding on bariatric surgery and is holding off at the moment as she is having results with Mounjaro    Migraine:  Patient states Emgality is not working as well as she had hoped. Still has a constant low grade headache all day everyday. OTC does not help with this either. She is interested in trying Ajovy. Do not see any contraindications to starting medication. Therefore we will have her stop Emgality and try Ajovy. She is to start on 1/1/2025 since she just did her Emgality dose on 12/1.     PLAN:  DECREASE Metformin to 500 mg in the morning  INCREASE Mounajro to 12.5 mg weekly   CONTINUE all other DM medications  STOP Emgality   START Ajovy 225 mg every 28 days  Follow up with clinical pharmacist: 1/2/25 @ 10:40   Follow up with PCP:  1/23/25        Thank you,  Coleen Zazueta, PharmD  Clinical Pharmacy Specialist - Primary Care  [phone]  [email or web address]      Continue all meds under the continuation of care with the referring provider and clinical pharmacy team.

## 2024-12-04 ENCOUNTER — PHARMACY VISIT (OUTPATIENT)
Dept: PHARMACY | Facility: CLINIC | Age: 59
End: 2024-12-04
Payer: MEDICARE

## 2024-12-04 ENCOUNTER — APPOINTMENT (OUTPATIENT)
Dept: PHARMACY | Facility: HOSPITAL | Age: 59
End: 2024-12-04
Payer: COMMERCIAL

## 2024-12-04 DIAGNOSIS — E11.69 TYPE 2 DIABETES MELLITUS WITH OBESITY (MULTI): ICD-10-CM

## 2024-12-04 DIAGNOSIS — E66.9 TYPE 2 DIABETES MELLITUS WITH OBESITY (MULTI): ICD-10-CM

## 2024-12-04 DIAGNOSIS — G43.719 INTRACTABLE CHRONIC MIGRAINE WITHOUT AURA AND WITHOUT STATUS MIGRAINOSUS: Primary | ICD-10-CM

## 2024-12-04 PROCEDURE — RXMED WILLOW AMBULATORY MEDICATION CHARGE

## 2024-12-04 RX ORDER — TIRZEPATIDE 12.5 MG/.5ML
12.5 INJECTION, SOLUTION SUBCUTANEOUS
Qty: 2 ML | Refills: 2 | Status: SHIPPED | OUTPATIENT
Start: 2024-12-04

## 2024-12-16 ENCOUNTER — TELEPHONE (OUTPATIENT)
Dept: PRIMARY CARE | Facility: CLINIC | Age: 59
End: 2024-12-16
Payer: COMMERCIAL

## 2024-12-16 DIAGNOSIS — I10 ESSENTIAL HYPERTENSION: ICD-10-CM

## 2024-12-16 DIAGNOSIS — K21.9 GASTROESOPHAGEAL REFLUX DISEASE, UNSPECIFIED WHETHER ESOPHAGITIS PRESENT: ICD-10-CM

## 2024-12-16 DIAGNOSIS — R35.0 FREQUENT URINATION: ICD-10-CM

## 2024-12-16 RX ORDER — OXYBUTYNIN CHLORIDE 5 MG/1
5 TABLET, EXTENDED RELEASE ORAL DAILY
Qty: 90 TABLET | Refills: 1 | Status: SHIPPED | OUTPATIENT
Start: 2024-12-16 | End: 2025-12-16

## 2024-12-16 RX ORDER — OMEPRAZOLE 40 MG/1
40 CAPSULE, DELAYED RELEASE ORAL
Qty: 90 CAPSULE | Refills: 1 | Status: SHIPPED | OUTPATIENT
Start: 2024-12-16

## 2024-12-16 RX ORDER — SPIRONOLACTONE 25 MG/1
25 TABLET ORAL DAILY
Qty: 90 TABLET | Refills: 1 | Status: SHIPPED | OUTPATIENT
Start: 2024-12-16

## 2024-12-16 NOTE — TELEPHONE ENCOUNTER
Patient called in and was very upset because she has not been able to get six of her meds filled even though the pharmacy is telling her that they are sending refill requests to us with no response.  In reviewing with her what she needs, I found that two of the meds (Wellbutrin and Losartan) were filled in July for 90 days with one refill.  She is going to check with the pharmacy about those.  The other meds she needs are:   *   Omeprazole   *   Spironolactone   *   Oxybutynin ER - She said you sent in a script for this but it was the wrong one.  She has to have the ER kind.      She refused to give the sixth med she needs.      Are you willing to send in scripts for these meds at this time?

## 2024-12-19 DIAGNOSIS — G43.719 INTRACTABLE CHRONIC MIGRAINE WITHOUT AURA AND WITHOUT STATUS MIGRAINOSUS: ICD-10-CM

## 2024-12-19 RX ORDER — HYDROCODONE BITARTRATE AND ACETAMINOPHEN 5; 325 MG/1; MG/1
1 TABLET ORAL 2 TIMES DAILY PRN
Qty: 20 TABLET | Refills: 0 | Status: SHIPPED | OUTPATIENT
Start: 2024-12-19

## 2024-12-26 PROCEDURE — RXMED WILLOW AMBULATORY MEDICATION CHARGE

## 2024-12-28 DIAGNOSIS — R11.0 NAUSEA IN ADULT: ICD-10-CM

## 2024-12-30 ENCOUNTER — PHARMACY VISIT (OUTPATIENT)
Dept: PHARMACY | Facility: CLINIC | Age: 59
End: 2024-12-30
Payer: MEDICARE

## 2024-12-30 RX ORDER — PROMETHAZINE HYDROCHLORIDE 25 MG/1
TABLET ORAL
Qty: 30 TABLET | Refills: 3 | Status: SHIPPED | OUTPATIENT
Start: 2024-12-30

## 2024-12-30 NOTE — PROGRESS NOTES
Pharmacist Clinic: Diabetes Management  Mary Jean Baptiste is a 59 y.o. female was referred to Clinical Pharmacy Team for diabetes management.   Referring Provider: Kimani Li MD   - Last visit 5/2/24    Subjective     HPI  - Patient gets cortisone shots into right knee    Current Pharmacotherapy:  - Mounjaro 12.5 mg weekly - Thursdays  - Farxiga 10 mg with breakfast   - Metformin  mg daily    Current diet:   - Significant reduction in cravings/appetite; it wears off at end of week for each injection  - Overall tries to watch carb/starch intake  - B = protein shakes, protein shakes   - Bariatric program at [facility]   - Feeling hall faster/decrease in cravings  - Last month did not feel has much weight loss as she wanted    Exercise:  - Walks 3 x a week  - Exercises and notices goes low after     Weight loss:   - Current weight: 179 lbs, back to 181 lbs  - Weight last appt: 182 lbs  - Baseline weight: 212 lbs    Current monitoring regimen:   Patient is using: glucometer; freestyle vika 3 - connected via [email or web address]    Reported blood sugars:   See APG Report below    Any episodes of hypoglycemia? No  - Checked with fingerstick and states they are coming back as 100 although CGM stating < 70    Adverse Effects: None             MIGRAINES  - Previously saw neurology but hasn't seen in a couple years  - Discussed injectable biologics in the past but neuro wanted to hold off; can't recall why  - Emgality PA approved     Current Pharmacotherapy:  - Ajovy 225 mg every 28 days   - never picked up due to cost  - Currently on nothing at the moment     Frequency:  - Less intense, has 1 intense one once a week or every other   - Low grade has gotten better  - Has 1 actual major headache/migraine a week (4-6 a month)     Historical pharmacotherapy:  - Qulipta - made her feel tired and had a low grade headache all the time; since stopping she feels better; tried for a month   - Nurtec ODT 75 mg every other day - did  not work  - Sumatriptan  - Topamax 300 mg twice daily (worked, but she felt like she started to have memory issues with this - stopped about a year ago)   - Propranolol (took in her 20's; now on atenolol)  - Botox  - Emgality - Still had constant low grade headache all the time  - OTC does not help with low grade    Allergies   Allergen Reactions    Azithromycin Other    Erythromycin Unknown    Fentanyl Unknown and Other     Other reaction(s): doesnt work, Other (See Comments), Unknown   NON THERAPUTIC    NON THERAPUTIC    Icosapent Ethyl Unknown    Levofloxacin Unknown    Metronidazole Unknown    Ondansetron Hcl Unknown    Penicillins Unknown    Erythromycin Base Rash    Iodine Rash and Unknown     Other reaction(s): Other (See Comments), Unknown   Chemical peritonitis    Chemical peritonitis    Lisinopril Rash       Objective     There were no vitals taken for this visit.    Historical Pharmacotherapy  Victoza 1.2 mg daily  Novolog SS (hasn't needed to use)    SECONDARY PREVENTION  - Statin? Yes   - ACE-I/ARB? Yes  - Aspirin? Yes    Pertinent PMH Review:  - PMH of Pancreatitis: No  - PMH of Retinopathy: No  - PMH of Urinary Tract Infections: No  - PMH of MTC: No    Lab Review  Lab Results   Component Value Date    BILITOT 0.3 10/10/2024    CALCIUM 9.3 10/10/2024    CO2 29 10/10/2024     10/10/2024    CREATININE 0.78 10/10/2024    GLUCOSE 102 (H) 10/10/2024    ALKPHOS 58 10/10/2024    K 4.5 10/10/2024    PROT 6.6 10/10/2024     10/10/2024    AST 16 10/10/2024    ALT 16 10/10/2024    BUN 19 10/10/2024    ANIONGAP 12 10/10/2024    ALBUMIN 4.3 10/10/2024    AMYLASE 32 02/12/2021    LIPASE 43 02/12/2021     Lab Results   Component Value Date    TRIG 91 10/10/2024    CHOL 176 10/10/2024    HDL 56.8 10/10/2024     Lab Results   Component Value Date    HGBA1C 6.2 (H) 10/10/2024    HGBA1C 6.5 (H) 06/17/2024    HGBA1C 6.9 (H) 04/30/2024     The 10-year ASCVD risk score (Esperanza DK, et al., 2019) is: 4%    Values  used to calculate the score:      Age: 59 years      Sex: Female      Is Non- : No      Diabetic: Yes      Tobacco smoker: No      Systolic Blood Pressure: 98 mmHg      Is BP treated: Yes      HDL Cholesterol: 56.8 mg/dL      Total Cholesterol: 176 mg/dL    Drug Interactions:  None    AFFORDABILITY:  - Ineligible for PAP    Assessment/Plan   Problem List Items Addressed This Visit       Type 2 diabetes mellitus with obesity (Multi)    Relevant Medications    dapagliflozin propanediol (Farxiga) 5 mg    Other Relevant Orders    Referral to Clinical Pharmacy       Diabetes:   Patient's diabetes is controlled with A1c of 6.2%.     DM:  Patient's blood sugars are looking very good. CGM is capturing some low blood sugar. Will decrease Farxiga to 5 mg today. Next appointment can consider taking her fully off Metformin.    Migraine:  Patient did not  Ajovy due to cost. I will try to send in a savings card to the pharmacy. If unable to use, we can try Aimovig. It needs a PA so will have to see what cost comes to. I recommended she follow up with neurology as well to get specialized care in these low grade headaches she gets.    PLAN:  DECREASE Farxiga to 5 mg daily  START Ajovy OR Aimovig if cost affordable   CONTINUE all other medications  Follow up with clinical pharmacist:  1/30/25 @ 11:20   Follow up with PCP: 1/23/25    Thank you,  Coleen Zazueta, PharmD  Clinical Pharmacy Specialist - Primary Care  [phone]  [email or web address]      Continue all meds under the continuation of care with the referring provider and clinical pharmacy team.

## 2025-01-02 ENCOUNTER — APPOINTMENT (OUTPATIENT)
Dept: PHARMACY | Facility: HOSPITAL | Age: 60
End: 2025-01-02
Payer: COMMERCIAL

## 2025-01-02 DIAGNOSIS — E11.69 TYPE 2 DIABETES MELLITUS WITH OBESITY (MULTI): ICD-10-CM

## 2025-01-02 DIAGNOSIS — G43.809 HEADACHE, VARIANT MIGRAINE: Primary | ICD-10-CM

## 2025-01-02 DIAGNOSIS — E66.9 TYPE 2 DIABETES MELLITUS WITH OBESITY (MULTI): ICD-10-CM

## 2025-01-02 RX ORDER — DAPAGLIFLOZIN 5 MG/1
5 TABLET, FILM COATED ORAL DAILY
Qty: 90 TABLET | Refills: 0 | Status: SHIPPED | OUTPATIENT
Start: 2025-01-02 | End: 2025-04-02

## 2025-01-02 RX ORDER — ERENUMAB-AOOE 70 MG/ML
70 INJECTION SUBCUTANEOUS
Qty: 1 ML | Refills: 1 | Status: SHIPPED | OUTPATIENT
Start: 2025-01-02

## 2025-01-03 PROCEDURE — RXMED WILLOW AMBULATORY MEDICATION CHARGE

## 2025-01-09 ENCOUNTER — LAB (OUTPATIENT)
Dept: LAB | Facility: LAB | Age: 60
End: 2025-01-09
Payer: COMMERCIAL

## 2025-01-09 ENCOUNTER — PHARMACY VISIT (OUTPATIENT)
Dept: PHARMACY | Facility: CLINIC | Age: 60
End: 2025-01-09
Payer: MEDICARE

## 2025-01-09 DIAGNOSIS — E78.2 MIXED HYPERLIPIDEMIA: ICD-10-CM

## 2025-01-09 DIAGNOSIS — R35.0 FREQUENT URINATION: ICD-10-CM

## 2025-01-09 DIAGNOSIS — F32.A ANXIETY AND DEPRESSION: ICD-10-CM

## 2025-01-09 DIAGNOSIS — E66.9 TYPE 2 DIABETES MELLITUS WITH OBESITY (MULTI): ICD-10-CM

## 2025-01-09 DIAGNOSIS — J45.909 ASTHMA, UNSPECIFIED ASTHMA SEVERITY, UNSPECIFIED WHETHER COMPLICATED, UNSPECIFIED WHETHER PERSISTENT (HHS-HCC): ICD-10-CM

## 2025-01-09 DIAGNOSIS — F41.9 ANXIETY AND DEPRESSION: ICD-10-CM

## 2025-01-09 DIAGNOSIS — G43.809 HEADACHE, VARIANT MIGRAINE: ICD-10-CM

## 2025-01-09 DIAGNOSIS — E11.69 TYPE 2 DIABETES MELLITUS WITH OBESITY (MULTI): ICD-10-CM

## 2025-01-09 DIAGNOSIS — E55.9 VITAMIN D DEFICIENCY: ICD-10-CM

## 2025-01-09 DIAGNOSIS — E03.9 ACQUIRED HYPOTHYROIDISM: ICD-10-CM

## 2025-01-09 DIAGNOSIS — I10 ESSENTIAL HYPERTENSION: ICD-10-CM

## 2025-01-09 LAB
25(OH)D3 SERPL-MCNC: 39 NG/ML (ref 30–100)
ALBUMIN SERPL BCP-MCNC: 4.3 G/DL (ref 3.4–5)
ALP SERPL-CCNC: 59 U/L (ref 33–110)
ALT SERPL W P-5'-P-CCNC: 18 U/L (ref 7–45)
ANION GAP SERPL CALC-SCNC: 15 MMOL/L (ref 10–20)
AST SERPL W P-5'-P-CCNC: 17 U/L (ref 9–39)
BASOPHILS # BLD AUTO: 0.08 X10*3/UL (ref 0–0.1)
BASOPHILS NFR BLD AUTO: 1 %
BILIRUB SERPL-MCNC: 0.4 MG/DL (ref 0–1.2)
BUN SERPL-MCNC: 19 MG/DL (ref 6–23)
CALCIUM SERPL-MCNC: 9.5 MG/DL (ref 8.6–10.3)
CHLORIDE SERPL-SCNC: 102 MMOL/L (ref 98–107)
CHOLEST SERPL-MCNC: 148 MG/DL (ref 0–199)
CHOLESTEROL/HDL RATIO: 2.6
CO2 SERPL-SCNC: 25 MMOL/L (ref 21–32)
CREAT SERPL-MCNC: 0.76 MG/DL (ref 0.5–1.05)
CREAT UR-MCNC: 40.3 MG/DL (ref 20–320)
EGFRCR SERPLBLD CKD-EPI 2021: 90 ML/MIN/1.73M*2
EOSINOPHIL # BLD AUTO: 0.15 X10*3/UL (ref 0–0.7)
EOSINOPHIL NFR BLD AUTO: 1.8 %
ERYTHROCYTE [DISTWIDTH] IN BLOOD BY AUTOMATED COUNT: 15.9 % (ref 11.5–14.5)
EST. AVERAGE GLUCOSE BLD GHB EST-MCNC: 123 MG/DL
GLUCOSE SERPL-MCNC: 131 MG/DL (ref 74–99)
HBA1C MFR BLD: 5.9 %
HCT VFR BLD AUTO: 47.9 % (ref 36–46)
HDLC SERPL-MCNC: 56.7 MG/DL
HGB BLD-MCNC: 14.6 G/DL (ref 12–16)
IMM GRANULOCYTES # BLD AUTO: 0.02 X10*3/UL (ref 0–0.7)
IMM GRANULOCYTES NFR BLD AUTO: 0.2 % (ref 0–0.9)
LDLC SERPL CALC-MCNC: 78 MG/DL
LYMPHOCYTES # BLD AUTO: 2.1 X10*3/UL (ref 1.2–4.8)
LYMPHOCYTES NFR BLD AUTO: 25 %
MCH RBC QN AUTO: 27 PG (ref 26–34)
MCHC RBC AUTO-ENTMCNC: 30.5 G/DL (ref 32–36)
MCV RBC AUTO: 89 FL (ref 80–100)
MICROALBUMIN UR-MCNC: <7 MG/L
MICROALBUMIN/CREAT UR: NORMAL MG/G{CREAT}
MONOCYTES # BLD AUTO: 0.62 X10*3/UL (ref 0.1–1)
MONOCYTES NFR BLD AUTO: 7.4 %
NEUTROPHILS # BLD AUTO: 5.42 X10*3/UL (ref 1.2–7.7)
NEUTROPHILS NFR BLD AUTO: 64.6 %
NON HDL CHOLESTEROL: 91 MG/DL (ref 0–149)
NRBC BLD-RTO: 0 /100 WBCS (ref 0–0)
PLATELET # BLD AUTO: 250 X10*3/UL (ref 150–450)
POTASSIUM SERPL-SCNC: 4.5 MMOL/L (ref 3.5–5.3)
PROT SERPL-MCNC: 6.2 G/DL (ref 6.4–8.2)
RBC # BLD AUTO: 5.4 X10*6/UL (ref 4–5.2)
SODIUM SERPL-SCNC: 137 MMOL/L (ref 136–145)
TRIGL SERPL-MCNC: 68 MG/DL (ref 0–149)
TSH SERPL-ACNC: 0.67 MIU/L (ref 0.44–3.98)
VLDL: 14 MG/DL (ref 0–40)
WBC # BLD AUTO: 8.4 X10*3/UL (ref 4.4–11.3)

## 2025-01-09 PROCEDURE — 80061 LIPID PANEL: CPT

## 2025-01-09 PROCEDURE — 80053 COMPREHEN METABOLIC PANEL: CPT

## 2025-01-09 PROCEDURE — 83036 HEMOGLOBIN GLYCOSYLATED A1C: CPT

## 2025-01-09 PROCEDURE — 82306 VITAMIN D 25 HYDROXY: CPT

## 2025-01-09 PROCEDURE — 82043 UR ALBUMIN QUANTITATIVE: CPT

## 2025-01-09 PROCEDURE — 82570 ASSAY OF URINE CREATININE: CPT

## 2025-01-09 PROCEDURE — 85025 COMPLETE CBC W/AUTO DIFF WBC: CPT

## 2025-01-09 PROCEDURE — 84443 ASSAY THYROID STIM HORMONE: CPT

## 2025-01-13 ENCOUNTER — TELEPHONE (OUTPATIENT)
Dept: PRIMARY CARE | Facility: CLINIC | Age: 60
End: 2025-01-13
Payer: COMMERCIAL

## 2025-01-13 DIAGNOSIS — G43.719 INTRACTABLE CHRONIC MIGRAINE WITHOUT AURA AND WITHOUT STATUS MIGRAINOSUS: ICD-10-CM

## 2025-01-13 RX ORDER — HYDROCODONE BITARTRATE AND ACETAMINOPHEN 5; 325 MG/1; MG/1
1 TABLET ORAL 2 TIMES DAILY PRN
Qty: 20 TABLET | Refills: 0 | Status: SHIPPED | OUTPATIENT
Start: 2025-01-19

## 2025-01-13 NOTE — TELEPHONE ENCOUNTER
Patient called in requesting a refill for Hydrocodone Acetaminophen 5-325 mg. Patient would like this to go to [facility] in [place].

## 2025-01-18 DIAGNOSIS — I10 ESSENTIAL HYPERTENSION: ICD-10-CM

## 2025-01-18 DIAGNOSIS — F41.9 ANXIETY AND DEPRESSION: ICD-10-CM

## 2025-01-18 DIAGNOSIS — F32.A ANXIETY AND DEPRESSION: ICD-10-CM

## 2025-01-18 DIAGNOSIS — E03.9 ACQUIRED HYPOTHYROIDISM: ICD-10-CM

## 2025-01-20 RX ORDER — BUPROPION HYDROCHLORIDE 150 MG/1
150 TABLET ORAL DAILY
Qty: 90 TABLET | Refills: 1 | Status: SHIPPED | OUTPATIENT
Start: 2025-01-20

## 2025-01-20 RX ORDER — LEVOTHYROXINE SODIUM 88 UG/1
88 TABLET ORAL DAILY
Qty: 90 TABLET | Refills: 1 | Status: SHIPPED | OUTPATIENT
Start: 2025-01-20 | End: 2025-01-23 | Stop reason: SDUPTHER

## 2025-01-20 RX ORDER — LOSARTAN POTASSIUM 100 MG/1
100 TABLET ORAL DAILY
Qty: 90 TABLET | Refills: 1 | Status: SHIPPED | OUTPATIENT
Start: 2025-01-20 | End: 2025-01-23 | Stop reason: SDUPTHER

## 2025-01-23 ENCOUNTER — APPOINTMENT (OUTPATIENT)
Dept: PRIMARY CARE | Facility: CLINIC | Age: 60
End: 2025-01-23
Payer: COMMERCIAL

## 2025-01-23 ENCOUNTER — PHARMACY VISIT (OUTPATIENT)
Dept: PHARMACY | Facility: CLINIC | Age: 60
End: 2025-01-23
Payer: MEDICARE

## 2025-01-23 VITALS
SYSTOLIC BLOOD PRESSURE: 103 MMHG | RESPIRATION RATE: 16 BRPM | HEIGHT: 63 IN | WEIGHT: 180.2 LBS | BODY MASS INDEX: 31.93 KG/M2 | TEMPERATURE: 97.5 F | DIASTOLIC BLOOD PRESSURE: 71 MMHG | HEART RATE: 61 BPM | OXYGEN SATURATION: 98 %

## 2025-01-23 DIAGNOSIS — E78.00 HYPERCHOLESTEREMIA: ICD-10-CM

## 2025-01-23 DIAGNOSIS — R35.0 FREQUENT URINATION: ICD-10-CM

## 2025-01-23 DIAGNOSIS — J47.9 BRONCHIECTASIS WITHOUT COMPLICATION (MULTI): ICD-10-CM

## 2025-01-23 DIAGNOSIS — E11.69 TYPE 2 DIABETES MELLITUS WITH OBESITY (MULTI): ICD-10-CM

## 2025-01-23 DIAGNOSIS — R22.2 PALPABLE MASS OF LOWER BACK: ICD-10-CM

## 2025-01-23 DIAGNOSIS — E55.9 VITAMIN D DEFICIENCY: Primary | ICD-10-CM

## 2025-01-23 DIAGNOSIS — R11.0 NAUSEA IN ADULT: ICD-10-CM

## 2025-01-23 DIAGNOSIS — F41.9 ANXIETY AND DEPRESSION: ICD-10-CM

## 2025-01-23 DIAGNOSIS — E66.9 TYPE 2 DIABETES MELLITUS WITH OBESITY (MULTI): ICD-10-CM

## 2025-01-23 DIAGNOSIS — M81.0 POSTMENOPAUSAL BONE LOSS: ICD-10-CM

## 2025-01-23 DIAGNOSIS — I10 ESSENTIAL HYPERTENSION: ICD-10-CM

## 2025-01-23 DIAGNOSIS — E03.9 ACQUIRED HYPOTHYROIDISM: ICD-10-CM

## 2025-01-23 DIAGNOSIS — F32.A ANXIETY AND DEPRESSION: ICD-10-CM

## 2025-01-23 DIAGNOSIS — E78.2 MIXED HYPERLIPIDEMIA: ICD-10-CM

## 2025-01-23 PROCEDURE — 4010F ACE/ARB THERAPY RXD/TAKEN: CPT | Performed by: INTERNAL MEDICINE

## 2025-01-23 PROCEDURE — RXMED WILLOW AMBULATORY MEDICATION CHARGE

## 2025-01-23 PROCEDURE — 3074F SYST BP LT 130 MM HG: CPT | Performed by: INTERNAL MEDICINE

## 2025-01-23 PROCEDURE — 3062F POS MACROALBUMINURIA REV: CPT | Performed by: INTERNAL MEDICINE

## 2025-01-23 PROCEDURE — 3008F BODY MASS INDEX DOCD: CPT | Performed by: INTERNAL MEDICINE

## 2025-01-23 PROCEDURE — 3078F DIAST BP <80 MM HG: CPT | Performed by: INTERNAL MEDICINE

## 2025-01-23 PROCEDURE — 99215 OFFICE O/P EST HI 40 MIN: CPT | Performed by: INTERNAL MEDICINE

## 2025-01-23 PROCEDURE — 3048F LDL-C <100 MG/DL: CPT | Performed by: INTERNAL MEDICINE

## 2025-01-23 PROCEDURE — RXOTC WILLOW AMBULATORY OTC CHARGE

## 2025-01-23 PROCEDURE — 3044F HG A1C LEVEL LT 7.0%: CPT | Performed by: INTERNAL MEDICINE

## 2025-01-23 PROCEDURE — 1036F TOBACCO NON-USER: CPT | Performed by: INTERNAL MEDICINE

## 2025-01-23 RX ORDER — METFORMIN HYDROCHLORIDE 500 MG/1
500 TABLET, EXTENDED RELEASE ORAL 2 TIMES DAILY
Qty: 180 TABLET | Refills: 1 | Status: SHIPPED | OUTPATIENT
Start: 2025-01-23

## 2025-01-23 RX ORDER — TIRZEPATIDE 12.5 MG/.5ML
12.5 INJECTION, SOLUTION SUBCUTANEOUS
Qty: 2 ML | Refills: 2 | Status: SHIPPED | OUTPATIENT
Start: 2025-01-23

## 2025-01-23 RX ORDER — FLUOXETINE HYDROCHLORIDE 20 MG/1
20 CAPSULE ORAL DAILY
Qty: 90 CAPSULE | Refills: 2 | Status: SHIPPED | OUTPATIENT
Start: 2025-01-23 | End: 2025-07-22

## 2025-01-23 RX ORDER — PROMETHAZINE HYDROCHLORIDE 25 MG/1
25 TABLET ORAL EVERY 8 HOURS PRN
Qty: 90 TABLET | Refills: 3 | Status: SHIPPED | OUTPATIENT
Start: 2025-01-23

## 2025-01-23 RX ORDER — ATORVASTATIN CALCIUM 40 MG/1
40 TABLET, FILM COATED ORAL DAILY
Qty: 90 TABLET | Refills: 2 | Status: SHIPPED | OUTPATIENT
Start: 2025-01-23 | End: 2025-07-22

## 2025-01-23 RX ORDER — LOSARTAN POTASSIUM 100 MG/1
100 TABLET ORAL DAILY
Qty: 90 TABLET | Refills: 1 | Status: SHIPPED | OUTPATIENT
Start: 2025-01-23

## 2025-01-23 RX ORDER — FENOFIBRATE 160 MG/1
160 TABLET ORAL DAILY
Qty: 90 TABLET | Refills: 1 | Status: SHIPPED | OUTPATIENT
Start: 2025-01-23

## 2025-01-23 RX ORDER — ATENOLOL 50 MG/1
50 TABLET ORAL 2 TIMES DAILY
Qty: 180 TABLET | Refills: 1 | Status: SHIPPED | OUTPATIENT
Start: 2025-01-23

## 2025-01-23 RX ORDER — LEVOTHYROXINE SODIUM 88 UG/1
88 TABLET ORAL DAILY
Qty: 90 TABLET | Refills: 1 | Status: SHIPPED | OUTPATIENT
Start: 2025-01-23

## 2025-01-23 RX ORDER — OXYBUTYNIN CHLORIDE 10 MG/1
10 TABLET, EXTENDED RELEASE ORAL DAILY
Qty: 90 TABLET | Refills: 2 | Status: SHIPPED | OUTPATIENT
Start: 2025-01-23 | End: 2026-01-23

## 2025-01-23 SDOH — ECONOMIC STABILITY: FOOD INSECURITY: WITHIN THE PAST 12 MONTHS, YOU WORRIED THAT YOUR FOOD WOULD RUN OUT BEFORE YOU GOT MONEY TO BUY MORE.: NEVER TRUE

## 2025-01-23 SDOH — ECONOMIC STABILITY: FOOD INSECURITY: WITHIN THE PAST 12 MONTHS, THE FOOD YOU BOUGHT JUST DIDN'T LAST AND YOU DIDN'T HAVE MONEY TO GET MORE.: NEVER TRUE

## 2025-01-23 ASSESSMENT — LIFESTYLE VARIABLES
HOW MANY STANDARD DRINKS CONTAINING ALCOHOL DO YOU HAVE ON A TYPICAL DAY: PATIENT DOES NOT DRINK
AUDIT-C TOTAL SCORE: 0
HOW OFTEN DO YOU HAVE A DRINK CONTAINING ALCOHOL: NEVER
HOW OFTEN DO YOU HAVE SIX OR MORE DRINKS ON ONE OCCASION: NEVER
SKIP TO QUESTIONS 9-10: 1

## 2025-01-23 NOTE — ASSESSMENT & PLAN NOTE
LDL has improved with weight loss, continue atorvastatin and fenofibrate, recheck labs prior to 6 month visit, see if any meds are able to be decreased or stopped

## 2025-01-23 NOTE — ASSESSMENT & PLAN NOTE
BMI has dropped significantly, glucose readings have improved, continue current therapies, including Mounjaro and Farxiga  Recheck labs in 6 months

## 2025-01-23 NOTE — PROGRESS NOTES
"Chief Complaint/HPI:    stressors at home: home issues have improved, she has been stressed, daughter is moved out of home.     Anxiety/ stressors: patient takes Vistaril for anxiety, she  feels better now, she  takes bupropion 150 mg now, she would like to increase the dose of fluoxetine, it is helpful.  Patient did go though psychological evaluation for bariatrics, she states.      HA: patient will start Aimovig soon   I believe that it is clinically appropriate to prescribe this medication. Patient uses Norco as needed. She also uses Toradol IM, Phenergan and dexamethasone, Nyrtec was not effective She uses Toradol up to twice weekly. She will try Aimovig soon. Patient would like to increase the frequency of use of promethazine if possible     Obesity:  she did start Mounjaro, she is taking 12.5 mg dose weekly now, it does help with weight loss, she has lost weight, she gets exhausted.  She still sees [facility] for assessment. Patient still takes aldactone, she has noted increased swelling recently of the legs. Patient initially weighed 265#, she now weighs 180#  , patient follows up in 2/2025 with bariatrics, she may schedule surgery    Firm mass on the back: patient has developed a firm mass on the back, it is located near the buttocks, it has been noticeable since weight loss      reactive airways: patient sees allergist, Dr Adame     back pain: still has some back pain      \"central sleep apnea\": patient had sleep study per cardiology and pulmonology, she now uses CPAP now, patient still feels tired     DM type 2: Glucoses have been better, Hgb A1C is 5.9, patient no longer takes insulin. She takes Farxiga which has been decreased by clinical pharmacy, Mounjaro , and low dose metformin. Glucoses have been low at night, average glucose is in the 90s now.     breast lesion: patient still sees breast specialist at [facility], this is not changed     hypothyroidism : patient takes levothyroxine 88 mcg daily "     ROS otherwise negative aside from what was mentioned above in HPI.      Patient Active Problem List   Diagnosis    Abdominal pain, acute, right upper quadrant    Acute viral syndrome    Allergies    Anxiety and depression    Atypical chest pain    Breast mass, right    Bronchiectasis    Chest heaviness    Chronic headache    Cramp in muscle    Diverticulosis of large intestine    Type 2 diabetes mellitus with obesity (Multi)    Eruption due to drug    Essential hypertension    Frequent urination    Gastroenteritis, acute    Headache, variant migraine    Hematoma of right lower extremity    Hypothyroidism    Metabolic syndrome X    Migraines    Nausea in adult    Neuropathy    Multiple lung nodules on CT    Obesity with body mass index (BMI) of 30.0 to 39.9    Occupational asthma (HHS-HCC)    Mixed hyperlipidemia    Palpitations    Postmenopausal bone loss    Productive cough    Tonsil stone    Tonsillitis    Vitamin D insufficiency    Anxiety    Atherosclerosis of coronary artery    Chronic nonalcoholic liver disease    COPD (chronic obstructive pulmonary disease) (Multi)    Daytime sleepiness    Diabetes mellitus (Multi)    Diverticulitis    Fibrocystic breast changes of both breasts    GERD (gastroesophageal reflux disease)    History of depression    History of hypertension    Mastodynia of right breast    PLATA (dyspnea on exertion)    Shortness of breath at rest    Asthma    Sleep apnea    Solitary cyst of left breast    Hematoma of lower extremity    Vitamin D deficiency    Hypercholesteremia    Mild chronic obstructive pulmonary disease (Multi)    Malaise and fatigue    History of right breast biopsy    Morbid obesity, unspecified obesity type (Multi)    Palpable mass of lower back         Past Medical History:   Diagnosis Date    ADHD (attention deficit hyperactivity disorder)     Anxiety     Asthma     Cataract     COPD (chronic obstructive pulmonary disease) (Multi)     Depression     Diabetes mellitus  (Multi)     Disease of thyroid gland     Eating disorder     Eczema     GERD (gastroesophageal reflux disease)     Headache     Hypertension     Personal history of other diseases of the circulatory system     History of hypertension    Personal history of other diseases of the female genital tract     History of polycystic ovarian syndrome    Personal history of other diseases of the respiratory system     History of asthma    Personal history of other mental and behavioral disorders     History of depression     Past Surgical History:   Procedure Laterality Date    BREAST SURGERY  2005    CARDIAC CATHETERIZATION  2004    EYE SURGERY      HERNIA REPAIR  1965,2002    HYSTERECTOMY  2000    OTHER SURGICAL HISTORY  10/25/2019    Hysterectomy    OTHER SURGICAL HISTORY  10/25/2019    Turbinectomy    SINUS SURGERY      WISDOM TOOTH EXTRACTION  1981     Social History     Social History Narrative    Not on file         ALLERGIES  Azithromycin, Erythromycin, Fentanyl, Icosapent ethyl, Levofloxacin, Metronidazole, Ondansetron hcl, Penicillins, Erythromycin base, Iodine, and Lisinopril      MEDICATIONS  Current Outpatient Medications on File Prior to Visit   Medication Sig Dispense Refill    aspirin 81 mg EC tablet Take by mouth.      buPROPion XL (Wellbutrin XL) 150 mg 24 hr tablet TAKE 1 TABLET BY MOUTH EVERY DAY 90 tablet 1    cholecalciferol (Vitamin D-3) 50 mcg (2,000 unit) capsule Take 1 capsule (50 mcg) by mouth once daily.      dapagliflozin propanediol (Farxiga) 5 mg Take 1 tablet (5 mg) by mouth once daily. 90 tablet 0    dexAMETHasone (Decadron) 0.75 mg tablet Take 2 tablets (1.5 mg) by mouth 2 times a day with meals. 360 tablet 3    fluticasone furoate-vilanteroL (Breo Elipta) 200-25 mcg/dose inhaler Inhale 1 puff once daily.      FreeStyle Davey 3 Sensor device use as directed AND CHANGE EVERY 14 DAYS      HYDROcodone-acetaminophen (Norco) 5-325 mg tablet Take 1 tablet by mouth 2 times a day as needed for severe  "pain (7 - 10). Do not fill before January 19, 2025. 20 tablet 0    hydrOXYzine pamoate (VistariL) 25 mg capsule Take 1 capsule (25 mg) by mouth 3 times a day as needed for anxiety. 90 capsule 3    ketorolac 30 mg/mL (1 mL) injection inject 1 milliliter intramuscularly daily if needed for migraine, do not use more than 2 consecutive days 10 mL 2    magnesium oxide 500 mg tablet Take 1 tablet (500 mg) by mouth 2 times a day.      naloxone (Narcan) 4 mg/0.1 mL nasal spray Administer into affected nostril(s).      omeprazole (PriLOSEC) 40 mg DR capsule Take 1 capsule (40 mg) by mouth once daily in the morning. Take before meals. 90 capsule 1    OneTouch Ultra Test strip       pen needle, diabetic (Droplet Pen Needle) 32 gauge x 5/32\" needle Inject 1 Pen needle under the skin if needed (with insulin injections). 200 each 5    Qvar RediHaler 80 mcg/actuation inhaler Inhale 2 Inhalations 2 times a day.      sodium fluoride-pot nitrate 1.1-5 % paste BRUSH TWICE A DAY FOR 2 MINUTES AND DO NTO EAT OR DRINK FOR 1 HOUR AFTER BRUSHING      spironolactone (Aldactone) 25 mg tablet Take 1 tablet (25 mg) by mouth once daily. 90 tablet 1    syringe with needle, safety (BD Integra Syringe) 3 mL 25 gauge x 1\" syringe Inject 1 each as directed 1 time if needed (headache) for up to 1 dose. 10 each 3    tiotropium (Spiriva Respimat) 2.5 mcg/actuation inhaler Inhale 2 puffs once daily.      [DISCONTINUED] atenolol (Tenormin) 50 mg tablet Take 1 tablet (50 mg) by mouth 2 times a day. 180 tablet 1    [DISCONTINUED] atorvastatin (Lipitor) 40 mg tablet Take 1 tablet (40 mg) by mouth once daily. as directed 90 tablet 2    [DISCONTINUED] fenofibrate (Triglide) 160 mg tablet Take 1 tablet (160 mg) by mouth once daily. 90 tablet 1    [DISCONTINUED] FLUoxetine (PROzac) 10 mg capsule Take 1 capsule (10 mg) by mouth once daily. 90 capsule 1    [DISCONTINUED] levothyroxine (Synthroid, Levoxyl) 88 mcg tablet Take 1 tablet (88 mcg) by mouth once daily. " "90 tablet 1    [DISCONTINUED] losartan (Cozaar) 100 mg tablet Take 1 tablet (100 mg) by mouth once daily. 90 tablet 1    [DISCONTINUED] metFORMIN  mg 24 hr tablet Take 2 tablets (1,000 mg) by mouth 2 times a day. 360 tablet 1    [DISCONTINUED] oxybutynin XL (Ditropan XL) 5 mg 24 hr tablet Take 1 tablet (5 mg) by mouth once daily. Do not crush, chew, or split. 90 tablet 1    [DISCONTINUED] promethazine (Phenergan) 25 mg tablet TAKE 1 TABLET BY MOUTH THREE TIMES DAILY AS NEEDED FOR NAUSEA / FOR VOMITING 30 tablet 3    [DISCONTINUED] tirzepatide (Mounjaro) 12.5 mg/0.5 mL pen injector Inject 12.5 mg under the skin every 7 days. 2 mL 2    albuterol 2.5 mg /3 mL (0.083 %) nebulizer solution Take 3 mL (2.5 mg) by nebulization 4 times a day as needed for wheezing or shortness of breath. 360 mL 11    erenumab (Aimovig Autoinjector) 70 mg/mL injection Inject 1 mL (70 mg) under the skin every 28 (twenty-eight) days. 1 mL 1    [DISCONTINUED] b complex vitamins capsule Take 1 capsule by mouth 2 times a day. (Patient not taking: Reported on 1/23/2025)      [DISCONTINUED] buPROPion XL (Wellbutrin XL) 150 mg 24 hr tablet Take 1 tablet (150 mg) by mouth once daily. 90 tablet 1    [DISCONTINUED] levothyroxine (Synthroid, Levoxyl) 88 mcg tablet Take 1 tablet (88 mcg) by mouth once daily. 90 tablet 1    [DISCONTINUED] losartan (Cozaar) 100 mg tablet Take 1 tablet (100 mg) by mouth once daily. 90 tablet 1     No current facility-administered medications on file prior to visit.         PHYSICAL EXAM  /71 (BP Location: Left arm, Patient Position: Sitting, BP Cuff Size: Adult)   Pulse 61   Temp 36.4 °C (97.5 °F) (Temporal)   Resp 16   Ht 1.6 m (5' 3\")   Wt 81.7 kg (180 lb 3.2 oz)   SpO2 98%   BMI 31.92 kg/m²   Body mass index is 31.92 kg/m².    Constitutional   General appearance:  well developed, appears healthy, well nourished, overweight, she has lost significant weight. pleasant female, NAD.     Eyes   Inspection of " eyes: Sclera and conjunctiva were normal.  Wears glasses  Ears, Nose, Mouth, and Throat   Ears: Auricles: Normal.  Neck is supple, no bruits , no masses, no thyromegaly is appreciated  Pulmonary   Respiratory assessment: No respiratory distress, normal respiratory rhythm and effort.    Auscultation of Lungs:  lungs are clear,  no diminished breath sounds, occasional cough is noted.  Cardiovascular   Auscultation of heart: Apical pulse normal, heart rate and rhythm normal, normal S1 and S2, no murmurs and no pericardial rub.    Exam for edema: no edema is noted today  Lymphatic   Palpation of lymph nodes in neck: No cervical lymphadenopathy.   Neurologic   Cranial nerves: Nerves 2-12 were intact, no focal neuro defects.    Psychiatric   Orientation: Oriented to person, place, and time.    Mood and affect: Normal.   Skin  A mobile mass in the left parasacral region is noted, it has been more noticeable since weight loss       ASSESSMENT/PLAN  Problem List Items Addressed This Visit       Anxiety and depression    Relevant Medications    FLUoxetine (PROzac) 20 mg capsule    Other Relevant Orders    CBC and Auto Differential    Comprehensive Metabolic Panel    Bronchiectasis    Essential hypertension    Current Assessment & Plan     BP is well controlled, no med changes         Relevant Medications    atenolol (Tenormin) 50 mg tablet    losartan (Cozaar) 100 mg tablet    Other Relevant Orders    Albumin-Creatinine Ratio, Urine Random    CBC and Auto Differential    Comprehensive Metabolic Panel    Frequent urination    Relevant Medications    oxybutynin XL (Ditropan XL) 10 mg 24 hr tablet    Other Relevant Orders    CBC and Auto Differential    Comprehensive Metabolic Panel    Hypercholesteremia    Current Assessment & Plan     LDL has improved with weight loss, continue atorvastatin and fenofibrate, recheck labs prior to 6 month visit, see if any meds are able to be decreased or stopped          Relevant Medications     fenofibrate (Triglide) 160 mg tablet    Other Relevant Orders    CBC and Auto Differential    Comprehensive Metabolic Panel    Lipid Panel    Hypothyroidism    Current Assessment & Plan     Stable at present, no me changes         Relevant Medications    levothyroxine (Synthroid, Levoxyl) 88 mcg tablet    Other Relevant Orders    CBC and Auto Differential    Comprehensive Metabolic Panel    TSH with reflex to Free T4 if abnormal    Mixed hyperlipidemia    Relevant Medications    atorvastatin (Lipitor) 40 mg tablet    Other Relevant Orders    CBC and Auto Differential    Comprehensive Metabolic Panel    Nausea in adult    Relevant Medications    promethazine (Phenergan) 25 mg tablet    Other Relevant Orders    CBC and Auto Differential    Comprehensive Metabolic Panel    Palpable mass of lower back    Current Assessment & Plan     Refer to general surgery for evaluation, she will seen by Dr Washburn at [facility]         Relevant Orders    Referral to General Surgery    CBC and Auto Differential    Comprehensive Metabolic Panel    Postmenopausal bone loss    Relevant Orders    XR DEXA bone density    Type 2 diabetes mellitus with obesity (Multi)    Current Assessment & Plan     BMI has dropped significantly, glucose readings have improved, continue current therapies, including Mounjaro and Farxiga  Recheck labs in 6 months         Relevant Medications    metFORMIN  mg 24 hr tablet    tirzepatide (Mounjaro) 12.5 mg/0.5 mL pen injector    Other Relevant Orders    Albumin-Creatinine Ratio, Urine Random    CBC and Auto Differential    Comprehensive Metabolic Panel    Hemoglobin A1C    Vitamin D deficiency - Primary    Current Assessment & Plan     Continue to monitor         Relevant Orders    CBC and Auto Differential    Comprehensive Metabolic Panel    Vitamin D 25-Hydroxy,Total (for eval of Vitamin D levels)     Check labs prior to visit in 6 months, referred to general surgery for assessment of the back mass  Follow up  in 3 months    Kimani Li MD

## 2025-01-28 NOTE — PROGRESS NOTES
Pharmacist Clinic: Diabetes Management  Mary Jean Baptiste is a 59 y.o. female was referred to Clinical Pharmacy Team for diabetes management.   Referring Provider: Kimani Li MD   - Last visit 1/23/25    Subjective     HPI  - Patient gets cortisone shots into right knee    Current Pharmacotherapy:  - Mounjaro 12.5 mg weekly - Thursdays  - Farxiga 5 mg with breakfast   - Metformin  mg daily    Current diet:   - Significant reduction in cravings/appetite; it wears off at end of week for each injection  - Overall tries to watch carb/starch intake  - B = protein shakes, protein shakes   - Bariatric program at [facility]   - Feeling hall faster/decrease in cravings  - Last month did not feel has much weight loss as she wanted    Exercise:  - Walks 3 x a week  - Exercises and notices goes low after     Weight loss:   - Current weight: 177 lbs  - Weight last appt: 181 lbs  - Baseline weight: 212 lbs    Current monitoring regimen:   Patient is using: glucometer; freestyle vika 3 - connected via [employer]    Reported blood sugars:   See APG Report below    Any episodes of hypoglycemia? No  - Checked with fingerstick and states they are coming back as 100 although CGM stating < 70    Adverse Effects: None               MIGRAINES     Current Pharmacotherapy:  - Aimovig 70 mg subcutaneous every 28 day   - Started on 1/28/25     Frequency:  - Less intense, has 1 intense one once a week or every other   - Low grade has gotten better  - Has 1 actual major headache/migraine a week (4-6 a month)     Historical pharmacotherapy:  - Qulipta: made her feel tired and had a low grade headache all the time; since stopping she feels better; tried for a month   - Nurtec ODT 75 mg every other day: did not work  - Sumatriptan  - Topamax 300 mg twice daily: worked, but she felt like she started to have memory issues with this - stopped about a year ago  - Propranolol: took in her 20's; now on atenolol  - Botox  - Emgality: Still  had constant low grade headache all the time  - OTC does not help with low grade    Allergies   Allergen Reactions    Azithromycin Other    Erythromycin Unknown    Fentanyl Unknown and Other     Other reaction(s): doesnt work, Other (See Comments), Unknown   NON THERAPUTIC    NON THERAPUTIC    Icosapent Ethyl Unknown    Levofloxacin Unknown    Metronidazole Unknown    Ondansetron Hcl Unknown    Penicillins Unknown    Erythromycin Base Rash    Iodine Rash and Unknown     Other reaction(s): Other (See Comments), Unknown   Chemical peritonitis    Chemical peritonitis    Lisinopril Rash       Objective     There were no vitals taken for this visit.    Historical Pharmacotherapy  Victoza 1.2 mg daily  Novolog SS (hasn't needed to use)    SECONDARY PREVENTION  - Statin? Yes   - ACE-I/ARB? Yes  - Aspirin? Yes    Pertinent PMH Review:  - PMH of Pancreatitis: No  - PMH of Retinopathy: No  - PMH of Urinary Tract Infections: No  - PMH of MTC: No    Lab Review  Lab Results   Component Value Date    BILITOT 0.4 01/09/2025    CALCIUM 9.5 01/09/2025    CO2 25 01/09/2025     01/09/2025    CREATININE 0.76 01/09/2025    GLUCOSE 131 (H) 01/09/2025    ALKPHOS 59 01/09/2025    K 4.5 01/09/2025    PROT 6.2 (L) 01/09/2025     01/09/2025    AST 17 01/09/2025    ALT 18 01/09/2025    BUN 19 01/09/2025    ANIONGAP 15 01/09/2025    ALBUMIN 4.3 01/09/2025    AMYLASE 32 02/12/2021    LIPASE 43 02/12/2021     Lab Results   Component Value Date    TRIG 68 01/09/2025    CHOL 148 01/09/2025    HDL 56.7 01/09/2025     Lab Results   Component Value Date    HGBA1C 5.9 (H) 01/09/2025    HGBA1C 6.2 (H) 10/10/2024    HGBA1C 6.5 (H) 06/17/2024     The 10-year ASCVD risk score (Esperanza SINGLETARY, et al., 2019) is: 3.8%    Values used to calculate the score:      Age: 59 years      Sex: Female      Is Non- : No      Diabetic: Yes      Tobacco smoker: No      Systolic Blood Pressure: 103 mmHg      Is BP treated: Yes      HDL  Cholesterol: 56.7 mg/dL      Total Cholesterol: 148 mg/dL    Drug Interactions:  None    AFFORDABILITY:  - Ineligible for PAP    Assessment/Plan   Problem List Items Addressed This Visit       Type 2 diabetes mellitus with obesity (Multi)    Relevant Orders    Referral to Clinical Pharmacy       Diabetes:   Patient's diabetes is controlled with A1c of 5.9%.     Patient's blood sugars are looking very good. Low blood sugars have decreased. Recently took steroids for headache so have been running a bit high. Because she has been running so well otherwise, we will hold Metformin and see how blood sugars do. I told her if she notices them spiking, she can restart medication. She is agreeable to plan.    Migraine:  Patient just started on Aimovig a few days ago. Will need more time to evaluate efficacy. She was recommended to follow up with neuro per Dr. SHAH. States she needs a referral. Will contact Dr. SHAH about this. She wishes to see Dr. Jaime Kaufman @ [facility] (Fax: [phone]).    PLAN:  HOLD Metformin  CONTINUE all other DM medications  CONTINUE Aimovig  Follow up with clinical pharmacist: 3/6/25 @ 10   Follow up with PCP: 4/22/25    Thank you,  Coleen Zazueta, PharmD  Clinical Pharmacy Specialist - Primary Care  [phone]  [email or web address]      Continue all meds under the continuation of care with the referring provider and clinical pharmacy team.

## 2025-01-30 ENCOUNTER — APPOINTMENT (OUTPATIENT)
Dept: PHARMACY | Facility: HOSPITAL | Age: 60
End: 2025-01-30
Payer: COMMERCIAL

## 2025-01-30 DIAGNOSIS — E11.69 TYPE 2 DIABETES MELLITUS WITH OBESITY (MULTI): ICD-10-CM

## 2025-01-30 DIAGNOSIS — E66.9 TYPE 2 DIABETES MELLITUS WITH OBESITY (MULTI): ICD-10-CM

## 2025-02-06 ENCOUNTER — TELEPHONE (OUTPATIENT)
Dept: PRIMARY CARE | Facility: CLINIC | Age: 60
End: 2025-02-06
Payer: COMMERCIAL

## 2025-02-06 DIAGNOSIS — R51.9 CHRONIC INTRACTABLE HEADACHE, UNSPECIFIED HEADACHE TYPE: Primary | ICD-10-CM

## 2025-02-06 DIAGNOSIS — G89.29 CHRONIC INTRACTABLE HEADACHE, UNSPECIFIED HEADACHE TYPE: Primary | ICD-10-CM

## 2025-02-06 NOTE — TELEPHONE ENCOUNTER
Pt is calling for a referral to neurology Dr. Jaime Kaufman @ [facility] (Fax: [phone]).  Pt states she spoke with Coleen Zazueta and Dr SHAH about this referral    Once placed please fax to [phone]

## 2025-02-12 DIAGNOSIS — G43.719 INTRACTABLE CHRONIC MIGRAINE WITHOUT AURA AND WITHOUT STATUS MIGRAINOSUS: ICD-10-CM

## 2025-02-12 RX ORDER — HYDROCODONE BITARTRATE AND ACETAMINOPHEN 5; 325 MG/1; MG/1
1 TABLET ORAL 2 TIMES DAILY PRN
Qty: 20 TABLET | Refills: 0 | Status: SHIPPED | OUTPATIENT
Start: 2025-02-12

## 2025-02-20 ENCOUNTER — TELEPHONE (OUTPATIENT)
Dept: PHARMACY | Facility: HOSPITAL | Age: 60
End: 2025-02-20
Payer: COMMERCIAL

## 2025-02-20 PROCEDURE — RXMED WILLOW AMBULATORY MEDICATION CHARGE

## 2025-02-20 NOTE — TELEPHONE ENCOUNTER
Patient called to let me know that neurologist has switched her from Aimovig to Ajovy. She started a week ago and already has noticed that the low grade headache went away. He gave her 2 samples to last her 2 months.     We did try to see if patient could start medication last month but was coming as $150 and was too costly so that is why we went with Aimovig. I told patient this but next month during our appointment, I will see if we can find any savings card to bring down. Will screen her again for  PAP but she was ineligible last time we discussed it.    Coleen Zazueta, PharmD  Clinical Pharmacy Specialist - Primary Care  [phone]  [email or web address]

## 2025-02-24 ENCOUNTER — PHARMACY VISIT (OUTPATIENT)
Dept: PHARMACY | Facility: CLINIC | Age: 60
End: 2025-02-24
Payer: MEDICARE

## 2025-02-24 ENCOUNTER — HOSPITAL ENCOUNTER (OUTPATIENT)
Dept: RADIOLOGY | Facility: CLINIC | Age: 60
Discharge: HOME | End: 2025-02-24
Payer: COMMERCIAL

## 2025-02-24 DIAGNOSIS — M81.0 POSTMENOPAUSAL BONE LOSS: ICD-10-CM

## 2025-02-24 PROCEDURE — 77080 DXA BONE DENSITY AXIAL: CPT | Performed by: RADIOLOGY

## 2025-02-24 PROCEDURE — 77080 DXA BONE DENSITY AXIAL: CPT

## 2025-03-06 ENCOUNTER — APPOINTMENT (OUTPATIENT)
Dept: PHARMACY | Facility: HOSPITAL | Age: 60
End: 2025-03-06
Payer: COMMERCIAL

## 2025-03-06 DIAGNOSIS — G43.719 INTRACTABLE CHRONIC MIGRAINE WITHOUT AURA AND WITHOUT STATUS MIGRAINOSUS: ICD-10-CM

## 2025-03-06 DIAGNOSIS — G43.809 HEADACHE, VARIANT MIGRAINE: Primary | ICD-10-CM

## 2025-03-06 DIAGNOSIS — R51.9 CHRONIC INTRACTABLE HEADACHE, UNSPECIFIED HEADACHE TYPE: ICD-10-CM

## 2025-03-06 DIAGNOSIS — E66.9 TYPE 2 DIABETES MELLITUS WITH OBESITY (MULTI): ICD-10-CM

## 2025-03-06 DIAGNOSIS — G89.29 CHRONIC INTRACTABLE HEADACHE, UNSPECIFIED HEADACHE TYPE: ICD-10-CM

## 2025-03-06 DIAGNOSIS — E11.69 TYPE 2 DIABETES MELLITUS WITH OBESITY (MULTI): ICD-10-CM

## 2025-03-06 RX ORDER — FREMANEZUMAB-VFRM 225 MG/1.5ML
225 INJECTION SUBCUTANEOUS
Qty: 1.5 ML | Refills: 11 | Status: SHIPPED | OUTPATIENT
Start: 2025-03-06

## 2025-03-06 NOTE — PROGRESS NOTES
Pharmacist Clinic: Diabetes Management  Mary Jean Baptiste is a 59 y.o. female was referred to Clinical Pharmacy Team for diabetes management.   Referring Provider: Kimani Li MD   - Last visit 1/23/25    Subjective     HPI  - Patient gets cortisone shots into right knee    Current Pharmacotherapy:  - Mounjaro 12.5 mg weekly - Thursdays  - Farxiga 5 mg with breakfast   - Metformin  mg in the morning     Current diet:   - Significant reduction in cravings/appetite; it wears off at end of week for each injection  - Overall tries to watch carb/starch intake  - B = protein shakes, protein shakes   - Bariatric program at [facility]   - Feeling hall faster/decrease in cravings  - Last month did not feel has much weight loss as she wanted  - Eats meal before 6 pm  - Snacks on cheese sticks before bed if running lower    Exercise:  - Walks 3 x a week  - Exercises and notices goes low after     Weight loss:   - Baseline weight: 212 lbs  - Weight last appt: 181 lbs  - Current weight: 172-175 lbs    Current monitoring regimen:   Patient is using: glucometer; freestyle vika 3 - connected via [employer]    Reported blood sugars:   See APG Report below    Any episodes of hypoglycemia? Yes  - Had a day of blurred vision  - Is getting alerted at night but states she does lay on it so possible it is from true low BG; told her to start checking with fingersticks to ensure     Adverse Effects: None               MIGRAINES     Current Pharmacotherapy:  - Ajovy 225 mg subcutaneous every 28 day   - Started started 2 weeks ago  - Was given 2 samples by doctor   Frequency:  - Has 1 migraine a week (one lasted 3 days)  - No more low grade headache    Historical pharmacotherapy:  - Qulipta: made her feel tired and had a low grade headache all the time; since stopping she feels better; tried for a month   - Nurtec ODT 75 mg every other day: did not work  - Sumatriptan  - Topamax 300 mg twice daily: worked, but she felt like she  started to have memory issues with this - stopped about a year ago  - Propranolol: took in her 20's; now on atenolol  - Botox  - Emgality: Still had constant low grade headache all the time  - OTC does not help with low grade    Allergies   Allergen Reactions    Azithromycin Other    Erythromycin Unknown    Fentanyl Unknown and Other     Other reaction(s): doesnt work, Other (See Comments), Unknown   NON THERAPUTIC    NON THERAPUTIC    Icosapent Ethyl Unknown    Levofloxacin Unknown    Metronidazole Unknown    Ondansetron Hcl Unknown    Penicillins Unknown    Erythromycin Base Rash    Iodine Rash and Unknown     Other reaction(s): Other (See Comments), Unknown   Chemical peritonitis    Chemical peritonitis    Lisinopril Rash       Objective     There were no vitals taken for this visit.    Historical Pharmacotherapy  Victoza 1.2 mg daily  Novolog SS (hasn't needed to use)    SECONDARY PREVENTION  - Statin? Yes   - ACE-I/ARB? Yes  - Aspirin? Yes    Pertinent PMH Review:  - PMH of Pancreatitis: No  - PMH of Retinopathy: No  - PMH of Urinary Tract Infections: No  - PMH of MTC: No    Lab Review  Lab Results   Component Value Date    BILITOT 0.4 01/09/2025    CALCIUM 9.5 01/09/2025    CO2 25 01/09/2025     01/09/2025    CREATININE 0.76 01/09/2025    GLUCOSE 131 (H) 01/09/2025    ALKPHOS 59 01/09/2025    K 4.5 01/09/2025    PROT 6.2 (L) 01/09/2025     01/09/2025    AST 17 01/09/2025    ALT 18 01/09/2025    BUN 19 01/09/2025    ANIONGAP 15 01/09/2025    ALBUMIN 4.3 01/09/2025    AMYLASE 32 02/12/2021    LIPASE 43 02/12/2021     Lab Results   Component Value Date    TRIG 68 01/09/2025    CHOL 148 01/09/2025    HDL 56.7 01/09/2025     Lab Results   Component Value Date    HGBA1C 5.9 (H) 01/09/2025    HGBA1C 6.2 (H) 10/10/2024    HGBA1C 6.5 (H) 06/17/2024     The 10-year ASCVD risk score (Esperanza SINGLETARY, et al., 2019) is: 3.8%    Values used to calculate the score:      Age: 59 years      Sex: Female      Is  Non- : No      Diabetic: Yes      Tobacco smoker: No      Systolic Blood Pressure: 103 mmHg      Is BP treated: Yes      HDL Cholesterol: 56.7 mg/dL      Total Cholesterol: 148 mg/dL    Drug Interactions:  - None    AFFORDABILITY:  - Ineligible for  PAP    Assessment/Plan   Problem List Items Addressed This Visit       Chronic headache    Type 2 diabetes mellitus with obesity (Multi)    Relevant Orders    Referral to Clinical Pharmacy    Headache, variant migraine - Primary    Migraines    Relevant Medications    fremanezumab (Ajovy Autoinjector) 225 mg/1.5 mL auto-injector    Other Relevant Orders    Referral to Clinical Pharmacy       Diabetes:   Patient's diabetes is controlled with A1c of 5.9%.     Patient stopped metformin for a week then restarted because she thought they were high. According to CGM they were at goal but patient would like to push BG's into lower 100s. Now that she has restarted, she is getting alerted she is low at night mainly. Could be from her laying on it. At this time will discontinue metformin again and have her check with fingersticks to see if it is a true low or not.    Migraine:  Patient just started on Ajovy 2 weeks ago by neurologist. So far she states it is working and has helped with the constant low grade headache. Dr gave her samples but otherwise it is coming to $150/mo (that is with the voucher). Will speak with pharmacy to see if there is anything else we can do about cost since this is still costly to patient.     PLAN:  STOP Metformin  CONTINUE all other DM medications  CONTINUE Ajovy  Follow up with clinical pharmacist: 4/3/25 @ 10:40   Follow up with PCP: 4/22/25    Thank you,  Coleen Zazueta, PharmD  Clinical Pharmacy Specialist - Primary Care  [phone]  [email or web address]      Continue all meds under the continuation of care with the referring provider and clinical pharmacy team.

## 2025-03-10 ENCOUNTER — TELEPHONE (OUTPATIENT)
Dept: PRIMARY CARE | Facility: CLINIC | Age: 60
End: 2025-03-10
Payer: COMMERCIAL

## 2025-03-10 DIAGNOSIS — G43.719 INTRACTABLE CHRONIC MIGRAINE WITHOUT AURA AND WITHOUT STATUS MIGRAINOSUS: ICD-10-CM

## 2025-03-10 RX ORDER — HYDROCODONE BITARTRATE AND ACETAMINOPHEN 5; 325 MG/1; MG/1
1 TABLET ORAL 2 TIMES DAILY PRN
Qty: 20 TABLET | Refills: 0 | Status: SHIPPED | OUTPATIENT
Start: 2025-03-10

## 2025-03-10 NOTE — TELEPHONE ENCOUNTER
Refill:  HYDROcodone-acetaminophen (Norco) 5-325 mg table     Pharmacy    [id number] - [place], [place] - [address]  [address] [address], [facility] [address]  Phone: [phone]  Fax: [phone]

## 2025-03-10 NOTE — TELEPHONE ENCOUNTER
Refill:  HYDROcodone-acetaminophen (Norco) 5-325 mg table      Pharmacy     [id number] - [place], [place] - [address]  [address] [address], [facility] [address]  Phone: [phone]  Fax: [phone]

## 2025-03-20 PROCEDURE — RXMED WILLOW AMBULATORY MEDICATION CHARGE

## 2025-03-26 ENCOUNTER — PHARMACY VISIT (OUTPATIENT)
Dept: PHARMACY | Facility: CLINIC | Age: 60
End: 2025-03-26
Payer: MEDICARE

## 2025-04-02 DIAGNOSIS — G43.719 INTRACTABLE CHRONIC MIGRAINE WITHOUT AURA AND WITHOUT STATUS MIGRAINOSUS: ICD-10-CM

## 2025-04-02 RX ORDER — HYDROCODONE BITARTRATE AND ACETAMINOPHEN 5; 325 MG/1; MG/1
1 TABLET ORAL 2 TIMES DAILY PRN
Qty: 20 TABLET | Refills: 0 | Status: SHIPPED | OUTPATIENT
Start: 2025-04-02

## 2025-04-03 ENCOUNTER — APPOINTMENT (OUTPATIENT)
Dept: PHARMACY | Facility: HOSPITAL | Age: 60
End: 2025-04-03
Payer: COMMERCIAL

## 2025-04-03 ENCOUNTER — PHARMACY VISIT (OUTPATIENT)
Dept: PHARMACY | Facility: CLINIC | Age: 60
End: 2025-04-03
Payer: MEDICARE

## 2025-04-03 DIAGNOSIS — E11.69 TYPE 2 DIABETES MELLITUS WITH OBESITY (MULTI): ICD-10-CM

## 2025-04-03 DIAGNOSIS — G43.719 INTRACTABLE CHRONIC MIGRAINE WITHOUT AURA AND WITHOUT STATUS MIGRAINOSUS: ICD-10-CM

## 2025-04-03 DIAGNOSIS — E66.9 TYPE 2 DIABETES MELLITUS WITH OBESITY (MULTI): ICD-10-CM

## 2025-04-03 PROCEDURE — RXMED WILLOW AMBULATORY MEDICATION CHARGE

## 2025-04-03 NOTE — PROGRESS NOTES
Pharmacist Clinic: Diabetes Management  Mary Jean Baptiste is a 59 y.o. female was referred to Clinical Pharmacy Team for diabetes management.   Referring Provider: Kimani Li MD   - Last visit 1/23/25    Subjective     HPI  - Patient gets cortisone shots into right knee    Current Pharmacotherapy:  - Mounjaro 12.5 mg weekly - Thursdays  - Farxiga 5 mg with breakfast     Current diet:   - Significant reduction in cravings/appetite; it wears off at end of week for each injection  - Overall tries to watch carb/starch intake  - B = protein shakes, protein shakes   - Bariatric program at [facility]   - Feeling hall faster/decrease in cravings  - Last month did not feel has much weight loss as she wanted  - Eats meal before 6 pm  - Snacks on cheese sticks before bed if running lower  - Bariatric surgery on 4/30     Exercise:  - Walks 3 x a week  - Exercises and notices goes low after     Weight loss:   - Baseline weight: 212 lbs  - Weight last appt: 181 lbs  - Current weight: 172-175 lbs    Current monitoring regimen:   Patient is using: glucometer; freestyle vika 3 - connected via [employer]    Reported blood sugars:   See APG Report below    Any episodes of hypoglycemia? Yes  - Getting awaken at night    Adverse Effects: Constipation                MIGRAINES     Current Pharmacotherapy:  - Ajovy 225 mg subcutaneous every 28 day   - Started started 2 weeks ago  - Was given 2 samples by doctor     Frequency:  - Had 2 bigger migraines last month  - Had 8-10 smaller migraines   - No more low grade headache    Historical pharmacotherapy:  - Qulipta: made her feel tired and had a low grade headache all the time; since stopping she feels better; tried for a month   - Nurtec ODT 75 mg every other day: did not work  - Sumatriptan  - Topamax 300 mg twice daily: worked, but she felt like she started to have memory issues with this - stopped about a year ago  - Propranolol: took in her 20's; now on atenolol  - Botox  -  Emgality: Still had constant low grade headache all the time  - OTC does not help with low grade    Allergies   Allergen Reactions    Azithromycin Other    Erythromycin Unknown    Fentanyl Unknown and Other     Other reaction(s): doesnt work, Other (See Comments), Unknown   NON THERAPUTIC    NON THERAPUTIC    Icosapent Ethyl Unknown    Levofloxacin Unknown    Metronidazole Unknown    Ondansetron Hcl Unknown    Penicillins Unknown    Erythromycin Base Rash    Iodine Rash and Unknown     Other reaction(s): Other (See Comments), Unknown   Chemical peritonitis    Chemical peritonitis    Lisinopril Rash       Objective     There were no vitals taken for this visit.    Historical Pharmacotherapy  Victoza 1.2 mg daily  Novolog SS (hasn't needed to use)    SECONDARY PREVENTION  - Statin? Yes   - ACE-I/ARB? Yes  - Aspirin? Yes    Pertinent PMH Review:  - PMH of Pancreatitis: No  - PMH of Retinopathy: No  - PMH of Urinary Tract Infections: No  - PMH of MTC: No    Lab Review  Lab Results   Component Value Date    BILITOT 0.4 01/09/2025    CALCIUM 9.5 01/09/2025    CO2 25 01/09/2025     01/09/2025    CREATININE 0.76 01/09/2025    GLUCOSE 131 (H) 01/09/2025    ALKPHOS 59 01/09/2025    K 4.5 01/09/2025    PROT 6.2 (L) 01/09/2025     01/09/2025    AST 17 01/09/2025    ALT 18 01/09/2025    BUN 19 01/09/2025    ANIONGAP 15 01/09/2025    ALBUMIN 4.3 01/09/2025    AMYLASE 32 02/12/2021    LIPASE 43 02/12/2021   GFR = 90 (1/9/25)  Lab Results   Component Value Date    TRIG 68 01/09/2025    CHOL 148 01/09/2025    HDL 56.7 01/09/2025     Lab Results   Component Value Date    HGBA1C 5.9 (H) 01/09/2025    HGBA1C 6.2 (H) 10/10/2024    HGBA1C 6.5 (H) 06/17/2024     The 10-year ASCVD risk score (Esperanza SINGLETARY, et al., 2019) is: 3.8%    Values used to calculate the score:      Age: 59 years      Sex: Female      Is Non- : No      Diabetic: Yes      Tobacco smoker: No      Systolic Blood Pressure: 103 mmHg       Is BP treated: Yes      HDL Cholesterol: 56.7 mg/dL      Total Cholesterol: 148 mg/dL    Drug Interactions:  - None    AFFORDABILITY:  - Ineligible for [facility]  - Ajovy is not covered   - Aimovig: $75/mo    Assessment/Plan   Problem List Items Addressed This Visit       Type 2 diabetes mellitus with obesity (Multi)    Relevant Orders    Referral to Clinical Pharmacy    Migraines       Diabetes:   Patient's diabetes is controlled with A1c of 5.9%.     Patient's diabetes is very well controlled. Experiencing low blood sugars throughout the day. Will have her stop Farxiga. She also is getting bariatric surgery on 4/30. She is to stop Mounjaro a week before and for 2 weeks after per surgeon. Will talk with her 2 weeks after her surgery to discuss dosing of Mounjaro for re-initiation. May need to start at lower dose since she will be off of it for a month.     Migraine:  Ajovy not covered by insurance. Advised patient to continue to get samples through neurologist or can consider going back on Aimovig. She is working with neurologist with this. Will follow PRN for this since she is seeing specialist.     PLAN:  STOP Farxiga   CONTINUE Mounjaro 12.5 mg weekly  Follow up with clinical pharmacist: 5/12/25 @ 10:40   Follow up with PCP: 4/22/25    Thank you,  Coleen Zazueta, PharmD  Clinical Pharmacy Specialist - Primary Care  [phone]  [email or web address]      Continue all meds under the continuation of care with the referring provider and clinical pharmacy team.

## 2025-04-16 PROCEDURE — RXMED WILLOW AMBULATORY MEDICATION CHARGE

## 2025-04-22 ENCOUNTER — PHARMACY VISIT (OUTPATIENT)
Dept: PHARMACY | Facility: CLINIC | Age: 60
End: 2025-04-22
Payer: MEDICARE

## 2025-04-22 ENCOUNTER — APPOINTMENT (OUTPATIENT)
Dept: PRIMARY CARE | Facility: CLINIC | Age: 60
End: 2025-04-22
Payer: COMMERCIAL

## 2025-04-22 VITALS
SYSTOLIC BLOOD PRESSURE: 102 MMHG | HEIGHT: 63 IN | HEART RATE: 77 BPM | WEIGHT: 176 LBS | OXYGEN SATURATION: 98 % | TEMPERATURE: 96.6 F | BODY MASS INDEX: 31.18 KG/M2 | RESPIRATION RATE: 14 BRPM | DIASTOLIC BLOOD PRESSURE: 71 MMHG

## 2025-04-22 DIAGNOSIS — E78.2 MIXED HYPERLIPIDEMIA: ICD-10-CM

## 2025-04-22 DIAGNOSIS — G43.719 INTRACTABLE CHRONIC MIGRAINE WITHOUT AURA AND WITHOUT STATUS MIGRAINOSUS: ICD-10-CM

## 2025-04-22 DIAGNOSIS — E66.9 TYPE 2 DIABETES MELLITUS WITH OBESITY (MULTI): ICD-10-CM

## 2025-04-22 DIAGNOSIS — E11.69 TYPE 2 DIABETES MELLITUS WITH OBESITY (MULTI): ICD-10-CM

## 2025-04-22 DIAGNOSIS — N63.10 MASS OF RIGHT BREAST, UNSPECIFIED QUADRANT: ICD-10-CM

## 2025-04-22 DIAGNOSIS — R11.0 NAUSEA IN ADULT: ICD-10-CM

## 2025-04-22 DIAGNOSIS — G43.809 HEADACHE, VARIANT MIGRAINE: ICD-10-CM

## 2025-04-22 DIAGNOSIS — E55.9 VITAMIN D DEFICIENCY: ICD-10-CM

## 2025-04-22 DIAGNOSIS — E03.9 ACQUIRED HYPOTHYROIDISM: ICD-10-CM

## 2025-04-22 DIAGNOSIS — Z57.9: ICD-10-CM

## 2025-04-22 DIAGNOSIS — F41.9 ANXIETY AND DEPRESSION: ICD-10-CM

## 2025-04-22 DIAGNOSIS — J45.40: ICD-10-CM

## 2025-04-22 DIAGNOSIS — F41.9 ANXIETY: ICD-10-CM

## 2025-04-22 DIAGNOSIS — I10 ESSENTIAL HYPERTENSION: Primary | ICD-10-CM

## 2025-04-22 DIAGNOSIS — F32.A ANXIETY AND DEPRESSION: ICD-10-CM

## 2025-04-22 DIAGNOSIS — R35.0 FREQUENT URINATION: ICD-10-CM

## 2025-04-22 PROCEDURE — 3062F POS MACROALBUMINURIA REV: CPT | Performed by: INTERNAL MEDICINE

## 2025-04-22 PROCEDURE — 3048F LDL-C <100 MG/DL: CPT | Performed by: INTERNAL MEDICINE

## 2025-04-22 PROCEDURE — 1036F TOBACCO NON-USER: CPT | Performed by: INTERNAL MEDICINE

## 2025-04-22 PROCEDURE — 3078F DIAST BP <80 MM HG: CPT | Performed by: INTERNAL MEDICINE

## 2025-04-22 PROCEDURE — 3008F BODY MASS INDEX DOCD: CPT | Performed by: INTERNAL MEDICINE

## 2025-04-22 PROCEDURE — 99214 OFFICE O/P EST MOD 30 MIN: CPT | Performed by: INTERNAL MEDICINE

## 2025-04-22 PROCEDURE — RXMED WILLOW AMBULATORY MEDICATION CHARGE

## 2025-04-22 PROCEDURE — 3074F SYST BP LT 130 MM HG: CPT | Performed by: INTERNAL MEDICINE

## 2025-04-22 PROCEDURE — 3044F HG A1C LEVEL LT 7.0%: CPT | Performed by: INTERNAL MEDICINE

## 2025-04-22 PROCEDURE — 4010F ACE/ARB THERAPY RXD/TAKEN: CPT | Performed by: INTERNAL MEDICINE

## 2025-04-22 RX ORDER — BUPROPION HYDROCHLORIDE 100 MG/1
100 TABLET, EXTENDED RELEASE ORAL 2 TIMES DAILY
Qty: 60 TABLET | Refills: 3 | Status: SHIPPED | OUTPATIENT
Start: 2025-04-22 | End: 2026-04-22

## 2025-04-22 RX ORDER — PROMETHAZINE HYDROCHLORIDE 25 MG/1
25 TABLET ORAL EVERY 8 HOURS PRN
Qty: 90 TABLET | Refills: 3 | Status: SHIPPED | OUTPATIENT
Start: 2025-04-22

## 2025-04-22 RX ORDER — HYDROXYZINE PAMOATE 25 MG/1
25 CAPSULE ORAL 3 TIMES DAILY PRN
Qty: 90 CAPSULE | Refills: 3 | Status: SHIPPED | OUTPATIENT
Start: 2025-04-22

## 2025-04-22 RX ORDER — OXYBUTYNIN CHLORIDE 5 MG/1
5 TABLET ORAL 2 TIMES DAILY
Qty: 60 TABLET | Refills: 5 | Status: SHIPPED | OUTPATIENT
Start: 2025-04-22 | End: 2025-10-19

## 2025-04-22 RX ORDER — KETOROLAC TROMETHAMINE 30 MG/ML
INJECTION, SOLUTION INTRAMUSCULAR; INTRAVENOUS
Qty: 10 ML | Refills: 2 | Status: SHIPPED | OUTPATIENT
Start: 2025-04-22

## 2025-04-22 RX ORDER — HYDROCODONE BITARTRATE AND ACETAMINOPHEN 5; 325 MG/1; MG/1
1 TABLET ORAL 2 TIMES DAILY PRN
Qty: 20 TABLET | Refills: 0 | Status: SHIPPED | OUTPATIENT
Start: 2025-04-22

## 2025-04-22 SDOH — ECONOMIC STABILITY: FOOD INSECURITY: WITHIN THE PAST 12 MONTHS, THE FOOD YOU BOUGHT JUST DIDN'T LAST AND YOU DIDN'T HAVE MONEY TO GET MORE.: NEVER TRUE

## 2025-04-22 SDOH — ECONOMIC STABILITY: FOOD INSECURITY: WITHIN THE PAST 12 MONTHS, YOU WORRIED THAT YOUR FOOD WOULD RUN OUT BEFORE YOU GOT MONEY TO BUY MORE.: NEVER TRUE

## 2025-04-22 SDOH — HEALTH STABILITY - PHYSICAL HEALTH: OCCUPATIONAL EXPOSURE TO UNSPECIFIED RISK FACTOR: Z57.9

## 2025-04-22 ASSESSMENT — ANXIETY QUESTIONNAIRES
4. TROUBLE RELAXING: NOT AT ALL
1. FEELING NERVOUS, ANXIOUS, OR ON EDGE: NOT AT ALL
5. BEING SO RESTLESS THAT IT IS HARD TO SIT STILL: NOT AT ALL
6. BECOMING EASILY ANNOYED OR IRRITABLE: NOT AT ALL
IF YOU CHECKED OFF ANY PROBLEMS ON THIS QUESTIONNAIRE, HOW DIFFICULT HAVE THESE PROBLEMS MADE IT FOR YOU TO DO YOUR WORK, TAKE CARE OF THINGS AT HOME, OR GET ALONG WITH OTHER PEOPLE: NOT DIFFICULT AT ALL
GAD7 TOTAL SCORE: 0
3. WORRYING TOO MUCH ABOUT DIFFERENT THINGS: NOT AT ALL
2. NOT BEING ABLE TO STOP OR CONTROL WORRYING: NOT AT ALL
7. FEELING AFRAID AS IF SOMETHING AWFUL MIGHT HAPPEN: NOT AT ALL

## 2025-04-22 ASSESSMENT — LIFESTYLE VARIABLES
AUDIT-C TOTAL SCORE: 0
HOW OFTEN DO YOU HAVE A DRINK CONTAINING ALCOHOL: NEVER
HOW OFTEN DO YOU HAVE SIX OR MORE DRINKS ON ONE OCCASION: NEVER
SKIP TO QUESTIONS 9-10: 1
HOW MANY STANDARD DRINKS CONTAINING ALCOHOL DO YOU HAVE ON A TYPICAL DAY: PATIENT DOES NOT DRINK

## 2025-04-22 ASSESSMENT — PAIN SCALES - GENERAL: PAINLEVEL_OUTOF10: 0-NO PAIN

## 2025-04-22 NOTE — PROGRESS NOTES
"Chief Complaint/HPI:    Follow up:     stressors at home: home issues have improved, stress has been manageable, son lives at home, daughter is moved out of home.     Anxiety/ stressors: patient takes Vistaril for anxiety, she  feels better now, she  takes bupropion 150 mg now, she would like to increase the dose of fluoxetine, it is helpful.  Patient did go though psychological evaluation for bariatrics, she states.      HA: patient noted Aimovig was not effective.  I believe that it is clinically appropriate to prescribe this medication. Patient uses Norco as needed. She also uses Toradol IM, Phenergan and dexamethasone, Nyrtec was not effective She uses Toradol up to twice weekly. Ajovy was effective, but use is cost prohibitive.       Obesity:  she did start Mounjaro, she is taking 12.5 mg dose weekly now, it does help with weight loss, she has lost weight, she gets exhausted.  She still sees [facility] for assessment. Patient still takes aldactone, she has noted increased swelling recently of the legs. Patient initially weighed 265#, she now weighs 176# .     Breast mass: patient has noted a breast mass, she is seeing a breast surgeon at [facility].  Biopsy is scheduled.      Firm mass on the back: patient has developed a firm mass on the back, it is located near the buttocks, it has been noticeable since weight loss. Patient will see an orthopedic oncologist for evaluation at [facility], the patient was initially seen by surgery, she will get an orthopedic oncoloogy evaluation for a 2nd opinion     reactive airways: patient sees allergist, Dr Adame, recently had H. Influenza.      \"central sleep apnea\": patient had sleep study per cardiology and pulmonology, she now uses CPAP now, patient still feels tired     DM type 2: Glucoses have been better, Hgb A1C is 5.9, patient no longer takes insulin. She takes Farxiga which has been decreased by clinical pharmacy, Mounjaro , and low dose metformin. Glucoses have been " "low at night, average glucose is in the 90s now. Labs are to be completed in 6/2025      hypothyroidism : patient takes levothyroxine 88 mcg daily     ROS otherwise negative aside from what was mentioned above in HPI.      Problem List[1]      Medical History[2]  Surgical History[3]  Social History     Social History Narrative    Not on file         ALLERGIES  Azithromycin, Erythromycin, Fentanyl, Icosapent ethyl, Levofloxacin, Metronidazole, Ondansetron hcl, Penicillins, Erythromycin base, Iodine, and Lisinopril      MEDICATIONS  Medications Ordered Prior to Encounter[4]      PHYSICAL EXAM  /71 (BP Location: Left arm, Patient Position: Sitting, BP Cuff Size: Adult)   Pulse 77   Temp 35.9 °C (96.6 °F) (Temporal)   Resp 14   Ht 1.6 m (5' 3\")   Wt 79.8 kg (176 lb)   SpO2 98%   BMI 31.18 kg/m²   Body mass index is 31.18 kg/m².    Constitutional   General appearance:  well developed, appears healthy, well nourished, overweight, she has lost significant weight. pleasant female, NAD.     Eyes   Inspection of eyes: Sclera and conjunctiva were normal.  Wears glasses  Ears, Nose, Mouth, and Throat   Ears: Auricles: Normal.  Neck is supple, no bruits , no masses, no thyromegaly is appreciated  Pulmonary   Respiratory assessment: No respiratory distress, normal respiratory rhythm and effort.    Auscultation of Lungs: diffuse end inspiratory wheezes and rhonchi, clear with cough,   no diminished breath sounds, occasional cough is noted.  Cardiovascular   Auscultation of heart: Apical pulse normal, heart rate and rhythm normal, normal S1 and S2, no murmurs and no pericardial rub.    Exam for edema: no edema is noted today  Lymphatic   Palpation of lymph nodes in neck: No cervical lymphadenopathy.   Neurologic   Cranial nerves: Nerves 2-12 were intact, no focal neuro defects.    Psychiatric   Orientation: Oriented to person, place, and time.    Mood and affect: Normal.   Skin  A mobile mass in the left parasacral " region is noted, it is relatively small         ASSESSMENT/PLAN  Problem List Items Addressed This Visit       Anxiety    Relevant Medications    hydrOXYzine pamoate (VistariL) 25 mg capsule    Anxiety and depression    Current Assessment & Plan   She is stable at present time         Relevant Medications    buPROPion SR (Wellbutrin SR) 100 mg 12 hr tablet    Breast mass, right    Current Assessment & Plan   Patient will have biopsy completed at [facility]         Essential hypertension - Primary    Current Assessment & Plan   BP is well controlled. No need to change at present time. Weight loss has been helpful          Frequent urination    Relevant Medications    oxybutynin (Ditropan) 5 mg tablet    Headache, variant migraine    Relevant Medications    ketorolac 30 mg/mL (1 mL) injection    Hypothyroidism    Current Assessment & Plan   Monitor labs as ordered         Migraines    Current Assessment & Plan   No change in therapies         Relevant Medications    HYDROcodone-acetaminophen (Norco) 5-325 mg tablet    Mixed hyperlipidemia    Current Assessment & Plan   LDL is stable, monitor labs as ordered in 2 months         Nausea in adult    Relevant Medications    promethazine (Phenergan) 25 mg tablet    Occupational asthma ([facility])    Type 2 diabetes mellitus with obesity (Multi)    Current Assessment & Plan   Labs are ordered, glucoses are controlled         Vitamin D deficiency     Extended release meds discontinued, replaced with shorter acting med therapies, which will be needed post  bariatric surgery  Check labs as already ordered, follow up in 3 months    Kimani Li MD          [1]   Patient Active Problem List  Diagnosis    Abdominal pain, acute, right upper quadrant    Acute viral syndrome    Allergies    Anxiety and depression    Atypical chest pain    Breast mass, right    Bronchiectasis    Chest heaviness    Chronic headache    Cramp in muscle    Diverticulosis of large intestine    Type 2  diabetes mellitus with obesity (Multi)    Eruption due to drug    Essential hypertension    Frequent urination    Gastroenteritis, acute    Headache, variant migraine    Hematoma of right lower extremity    Hypothyroidism    Metabolic syndrome X    Migraines    Nausea in adult    Neuropathy    Multiple lung nodules on CT    Obesity with body mass index (BMI) of 30.0 to 39.9    Occupational asthma ([facility])    Mixed hyperlipidemia    Palpitations    Postmenopausal bone loss    Productive cough    Tonsil stone    Tonsillitis    Vitamin D insufficiency    Anxiety    Atherosclerosis of coronary artery    Chronic nonalcoholic liver disease    COPD (chronic obstructive pulmonary disease) (Multi)    Daytime sleepiness    Diabetes mellitus (Multi)    Diverticulitis    Fibrocystic breast changes of both breasts    GERD (gastroesophageal reflux disease)    History of depression    History of hypertension    Mastodynia of right breast    PLATA (dyspnea on exertion)    Shortness of breath at rest    Asthma    Sleep apnea    Solitary cyst of left breast    Hematoma of lower extremity    Vitamin D deficiency    Hypercholesteremia    Mild chronic obstructive pulmonary disease (Multi)    Malaise and fatigue    History of right breast biopsy    Morbid obesity, unspecified obesity type (Multi)    Palpable mass of lower back   [2]   Past Medical History:  Diagnosis Date    ADHD (attention deficit hyperactivity disorder)     Anxiety     Asthma     Cataract     COPD (chronic obstructive pulmonary disease) (Multi)     Depression     Diabetes mellitus (Multi)     Disease of thyroid gland     Eating disorder     Eczema     GERD (gastroesophageal reflux disease)     Headache     Hypertension     Personal history of other diseases of the circulatory system     History of hypertension    Personal history of other diseases of the female genital tract     History of polycystic ovarian syndrome    Personal history of other diseases of the  respiratory system     History of asthma    Personal history of other mental and behavioral disorders     History of depression   [3]   Past Surgical History:  Procedure Laterality Date    BREAST SURGERY  2005    CARDIAC CATHETERIZATION  2004    EYE SURGERY      HERNIA REPAIR  1965,2002    HYSTERECTOMY  2000    OTHER SURGICAL HISTORY  10/25/2019    Hysterectomy    OTHER SURGICAL HISTORY  10/25/2019    Turbinectomy    SINUS SURGERY      WISDOM TOOTH EXTRACTION  1981   [4]   Current Outpatient Medications on File Prior to Visit   Medication Sig Dispense Refill    aspirin 81 mg EC tablet Take by mouth.      atenolol (Tenormin) 50 mg tablet Take 1 tablet (50 mg) by mouth 2 times a day. 180 tablet 1    atorvastatin (Lipitor) 40 mg tablet Take 1 tablet (40 mg) by mouth once daily. as directed 90 tablet 2    cholecalciferol (Vitamin D-3) 50 mcg (2,000 unit) capsule Take 1 capsule (50 mcg) by mouth once daily.      dexAMETHasone (Decadron) 0.75 mg tablet Take 2 tablets (1.5 mg) by mouth 2 times a day with meals. 360 tablet 3    fenofibrate (Triglide) 160 mg tablet Take 1 tablet (160 mg) by mouth once daily. 90 tablet 1    FLUoxetine (PROzac) 20 mg capsule Take 1 capsule (20 mg) by mouth once daily. 90 capsule 2    fluticasone furoate-vilanteroL (Breo Elipta) 200-25 mcg/dose inhaler Inhale 1 puff once daily.      FreeStyle Davey 3 Sensor device use as directed AND CHANGE EVERY 14 DAYS      fremanezumab (Ajovy Autoinjector) 225 mg/1.5 mL auto-injector Inject 1 Pen (225 mg) under the skin every 28 (twenty-eight) days. 1.5 mL 11    levothyroxine (Synthroid, Levoxyl) 88 mcg tablet Take 1 tablet (88 mcg) by mouth once daily. 90 tablet 1    losartan (Cozaar) 100 mg tablet Take 1 tablet (100 mg) by mouth once daily. 90 tablet 1    magnesium oxide 500 mg tablet Take 1 tablet (500 mg) by mouth 2 times a day.      naloxone (Narcan) 4 mg/0.1 mL nasal spray Administer into affected nostril(s).      omeprazole (PriLOSEC) 40 mg DR capsule  "Take 1 capsule (40 mg) by mouth once daily in the morning. Take before meals. 90 capsule 1    OneTouch Ultra Test strip       pen needle, diabetic (Droplet Pen Needle) 32 gauge x 5/32\" needle Inject 1 Pen needle under the skin if needed (with insulin injections). 200 each 5    Qvar RediHaler 80 mcg/actuation inhaler Inhale 2 Inhalations 2 times a day.      sodium fluoride-pot nitrate 1.1-5 % paste BRUSH TWICE A DAY FOR 2 MINUTES AND DO NTO EAT OR DRINK FOR 1 HOUR AFTER BRUSHING      spironolactone (Aldactone) 25 mg tablet Take 1 tablet (25 mg) by mouth once daily. 90 tablet 1    syringe with needle, safety (BD Integra Syringe) 3 mL 25 gauge x 1\" syringe Inject 1 each as directed 1 time if needed (headache) for up to 1 dose. 10 each 3    tiotropium (Spiriva Respimat) 2.5 mcg/actuation inhaler Inhale 2 puffs once daily.      tirzepatide (Mounjaro) 12.5 mg/0.5 mL pen injector Inject 12.5 mg under the skin every 7 days. 2 mL 2    [DISCONTINUED] buPROPion XL (Wellbutrin XL) 150 mg 24 hr tablet TAKE 1 TABLET BY MOUTH EVERY DAY 90 tablet 1    [DISCONTINUED] HYDROcodone-acetaminophen (Norco) 5-325 mg tablet Take 1 tablet by mouth 2 times a day as needed for severe pain (7 - 10). 20 tablet 0    [DISCONTINUED] hydrOXYzine pamoate (VistariL) 25 mg capsule Take 1 capsule (25 mg) by mouth 3 times a day as needed for anxiety. 90 capsule 3    [DISCONTINUED] ketorolac 30 mg/mL (1 mL) injection inject 1 milliliter intramuscularly daily if needed for migraine, do not use more than 2 consecutive days 10 mL 2    [DISCONTINUED] oxybutynin XL (Ditropan XL) 10 mg 24 hr tablet Take 1 tablet (10 mg) by mouth once daily. Do not crush, chew, or split. 90 tablet 2    [DISCONTINUED] promethazine (Phenergan) 25 mg tablet Take 1 tablet (25 mg) by mouth every 8 hours if needed for nausea or vomiting. 90 tablet 3    albuterol 2.5 mg /3 mL (0.083 %) nebulizer solution Take 3 mL (2.5 mg) by nebulization 4 times a day as needed for wheezing or " shortness of breath. 360 mL 11     No current facility-administered medications on file prior to visit.

## 2025-04-23 DIAGNOSIS — K21.9 GASTROESOPHAGEAL REFLUX DISEASE, UNSPECIFIED WHETHER ESOPHAGITIS PRESENT: ICD-10-CM

## 2025-04-24 RX ORDER — OMEPRAZOLE 40 MG/1
CAPSULE, DELAYED RELEASE ORAL
Qty: 90 CAPSULE | Refills: 1 | Status: SHIPPED | OUTPATIENT
Start: 2025-04-24

## 2025-05-09 NOTE — PROGRESS NOTES
Pharmacist Clinic: Diabetes Management  Mary Jean Baptiste is a 59 y.o. female was referred to Clinical Pharmacy Team for diabetes management.   Referring Provider: Kimani Li MD   - Last visit: 4/22/25    Subjective     HPI  - Patient gets cortisone shots into right knee    Current Pharmacotherapy:  - Mounjaro 12.5 mg weekly - Thursdays    Current diet:   - Significant reduction in cravings/appetite; it wears off at end of week for each injection  - Overall tries to watch carb/starch intake  - B = protein shakes, protein shakes   - [facility]ariatric program at [facility]   - Feeling hall faster/decrease in cravings  - Last month did not feel has much weight loss as she wanted  - Eats meal before 6 pm  - Snacks on cheese sticks before bed if running lower  - Feeling hall faster/decreased portion sizes    Exercise:  - Walks 3 x a week  - Exercises and notices goes low after     Weight loss:   - Baseline weight: 212 lbs  - Weight last appt: 172-175 lbs  - Current weight: 170 lbs     Current monitoring regimen:   Patient is using: glucometer; freestyle vika 3 - connected via [employer]    Reported blood sugars:   See APG Report below    Any episodes of hypoglycemia? Yes  - At night    Adverse Effects: Constipation         Allergies   Allergen Reactions    Azithromycin Other    Erythromycin Unknown    Fentanyl Unknown and Other     Other reaction(s): doesnt work, Other (See Comments), Unknown   NON THERAPUTIC    NON THERAPUTIC    Icosapent Ethyl Unknown    Levofloxacin Unknown    Metronidazole Unknown    Ondansetron Hcl Unknown    Penicillins Unknown    Erythromycin Base Rash    Iodine Rash and Unknown     Other reaction(s): Other (See Comments), Unknown   Chemical peritonitis    Chemical peritonitis    Lisinopril Rash       Objective     There were no vitals taken for this visit.    Historical Pharmacotherapy  Victoza 1.2 mg daily  Novolog SS (hasn't needed to use)    SECONDARY PREVENTION  - Statin? Yes   - ACE-I/ARB?  Yes  - Aspirin? Yes    Pertinent PMH Review:  - PMH of Pancreatitis: No  - PMH of Retinopathy: No  - PMH of Urinary Tract Infections: No  - PMH of MTC: No    Lab Review  Lab Results   Component Value Date    BILITOT 0.4 01/09/2025    CALCIUM 9.5 01/09/2025    CO2 25 01/09/2025     01/09/2025    CREATININE 0.76 01/09/2025    GLUCOSE 131 (H) 01/09/2025    ALKPHOS 59 01/09/2025    K 4.5 01/09/2025    PROT 6.2 (L) 01/09/2025     01/09/2025    AST 17 01/09/2025    ALT 18 01/09/2025    BUN 19 01/09/2025    ANIONGAP 15 01/09/2025    ALBUMIN 4.3 01/09/2025    AMYLASE 32 02/12/2021    LIPASE 43 02/12/2021     Lab Results   Component Value Date    EGFR 90 01/09/2025      Lab Results   Component Value Date    TRIG 68 01/09/2025    CHOL 148 01/09/2025    HDL 56.7 01/09/2025     Lab Results   Component Value Date    HGBA1C 5.9 (H) 01/09/2025    HGBA1C 6.2 (H) 10/10/2024    HGBA1C 6.5 (H) 06/17/2024     The 10-year ASCVD risk score (Esperanza SINGLETARY, et al., 2019) is: 3.7%    Values used to calculate the score:      Age: 59 years      Sex: Female      Is Non- : No      Diabetic: Yes      Tobacco smoker: No      Systolic Blood Pressure: 102 mmHg      Is BP treated: Yes      HDL Cholesterol: 56.7 mg/dL      Total Cholesterol: 148 mg/dL    Drug Interactions:  - None    AFFORDABILITY:  - Ineligible for  PAP    Assessment/Plan   Problem List Items Addressed This Visit       Type 2 diabetes mellitus with obesity (Multi)    Relevant Medications    tirzepatide (Mounjaro) 12.5 mg/0.5 mL pen injector    Other Relevant Orders    Referral to Clinical Pharmacy     Patient's diabetes is controlled with A1c of 5.9%.     Farxiga was stopped last visit due to increase in lows. Lows have improved since stopping. Still having some at night. Educated to increase protein intake to help. She is also having some constipation which she now takes Sennakot S and docusate for. Told her to also increase fibrous foods into her  diet and keep up with water intake. Will continue current regimen and follow up in 2 months.    PLAN:  CONTINUE Mounjaro 12.5 mg weekly  Education: protein/fiber intake  Follow up with clinical pharmacist: 7/7/25 @ 11  Follow up with PCP: 8/13/25     Thank you,  Coleen Zazueta, PharmD  Clinical Pharmacy Specialist - Primary Care  [phone]  [email or web address]      Continue all meds under the continuation of care with the referring provider and clinical pharmacy team.

## 2025-05-12 ENCOUNTER — APPOINTMENT (OUTPATIENT)
Dept: PHARMACY | Facility: HOSPITAL | Age: 60
End: 2025-05-12
Payer: COMMERCIAL

## 2025-05-12 DIAGNOSIS — E11.69 TYPE 2 DIABETES MELLITUS WITH OBESITY (MULTI): ICD-10-CM

## 2025-05-12 DIAGNOSIS — E66.9 TYPE 2 DIABETES MELLITUS WITH OBESITY (MULTI): ICD-10-CM

## 2025-05-12 PROCEDURE — RXMED WILLOW AMBULATORY MEDICATION CHARGE

## 2025-05-12 RX ORDER — TIRZEPATIDE 12.5 MG/.5ML
12.5 INJECTION, SOLUTION SUBCUTANEOUS
Qty: 2 ML | Refills: 4 | Status: SHIPPED | OUTPATIENT
Start: 2025-05-12

## 2025-05-19 ENCOUNTER — TELEPHONE (OUTPATIENT)
Dept: PRIMARY CARE | Facility: CLINIC | Age: 60
End: 2025-05-19
Payer: COMMERCIAL

## 2025-05-19 NOTE — TELEPHONE ENCOUNTER
Prior authorization request received via fax for Nurtec 75 MG (renewal)     Form given to: PLACED IN LEAD MA'S BOX ON  5/19/25

## 2025-05-20 ENCOUNTER — PHARMACY VISIT (OUTPATIENT)
Dept: PHARMACY | Facility: CLINIC | Age: 60
End: 2025-05-20
Payer: MEDICARE

## 2025-05-20 PROCEDURE — RXOTC WILLOW AMBULATORY OTC CHARGE

## 2025-05-27 DIAGNOSIS — G43.719 INTRACTABLE CHRONIC MIGRAINE WITHOUT AURA AND WITHOUT STATUS MIGRAINOSUS: ICD-10-CM

## 2025-05-27 RX ORDER — HYDROCODONE BITARTRATE AND ACETAMINOPHEN 5; 325 MG/1; MG/1
1 TABLET ORAL 2 TIMES DAILY PRN
Qty: 20 TABLET | Refills: 0 | Status: SHIPPED | OUTPATIENT
Start: 2025-05-27

## 2025-05-27 NOTE — TELEPHONE ENCOUNTER
Med refill (NEW PHARMACY)    HYDROcodone-acetaminophen (Norco) 5-325 mg tablet [[id number]]     Pharmacy:  [facility]

## 2025-06-12 PROCEDURE — RXMED WILLOW AMBULATORY MEDICATION CHARGE

## 2025-06-16 DIAGNOSIS — G43.719 INTRACTABLE CHRONIC MIGRAINE WITHOUT AURA AND WITHOUT STATUS MIGRAINOSUS: ICD-10-CM

## 2025-06-16 RX ORDER — HYDROCODONE BITARTRATE AND ACETAMINOPHEN 5; 325 MG/1; MG/1
1 TABLET ORAL 2 TIMES DAILY PRN
Qty: 20 TABLET | Refills: 0 | Status: CANCELLED | OUTPATIENT
Start: 2025-06-16

## 2025-06-17 DIAGNOSIS — G43.719 INTRACTABLE CHRONIC MIGRAINE WITHOUT AURA AND WITHOUT STATUS MIGRAINOSUS: ICD-10-CM

## 2025-06-17 RX ORDER — HYDROCODONE BITARTRATE AND ACETAMINOPHEN 5; 325 MG/1; MG/1
1 TABLET ORAL 2 TIMES DAILY PRN
Qty: 20 TABLET | Refills: 0 | Status: SHIPPED | OUTPATIENT
Start: 2025-06-17

## 2025-06-18 ENCOUNTER — PHARMACY VISIT (OUTPATIENT)
Dept: PHARMACY | Facility: CLINIC | Age: 60
End: 2025-06-18
Payer: MEDICARE

## 2025-07-07 ENCOUNTER — APPOINTMENT (OUTPATIENT)
Dept: PHARMACY | Facility: HOSPITAL | Age: 60
End: 2025-07-07
Payer: COMMERCIAL

## 2025-07-07 PROCEDURE — RXMED WILLOW AMBULATORY MEDICATION CHARGE

## 2025-07-09 ENCOUNTER — PHARMACY VISIT (OUTPATIENT)
Dept: PHARMACY | Facility: CLINIC | Age: 60
End: 2025-07-09
Payer: MEDICARE

## 2025-07-12 DIAGNOSIS — E78.00 HYPERCHOLESTEREMIA: ICD-10-CM

## 2025-07-14 RX ORDER — FENOFIBRATE 160 MG/1
160 TABLET ORAL DAILY
Qty: 90 TABLET | Refills: 0 | Status: SHIPPED | OUTPATIENT
Start: 2025-07-14

## 2025-07-14 NOTE — PROGRESS NOTES
Pharmacist Clinic: Diabetes Management  Mary Jean Baptiste is a 60 y.o. female was referred to Clinical Pharmacy Team for diabetes management.   Referring Provider: Kimani Li MD   - Last visit: 4/22/25    Subjective     HPI  - Patient gets cortisone shots into right knee    Current Pharmacotherapy:  - Mounjaro 12.5 mg weekly - Thursdays    Current diet:   - Significant reduction in cravings/appetite; it wears off at end of week for each injection  - Overall tries to watch carb/starch intake  - B = protein shakes, protein shakes   - Bariatric program at [facility]   - Feeling hall faster/decrease in cravings  - Last month did not feel has much weight loss as she wanted  - Eats meal before 6 pm  - Snacks on cheese sticks before bed if running lower  - Feeling hall faster/decreased portion sizes (not helping like it was)    Exercise:  - Walks 3 x a week  - Exercises and notices goes low after     Weight loss:   - Baseline weight: 212 lbs  - Weight last appt: 170 lbs  - Current weight: 170-175 lbs   - Was down to 163 at one point     Current monitoring regimen:   Patient is using: glucometer; freestyle vika 3 - connected via [employer]    Reported blood sugars:   See APG Report below    Any episodes of hypoglycemia? No    Adverse Effects: Constipation         Allergies   Allergen Reactions    Azithromycin Other    Erythromycin Unknown    Fentanyl Unknown and Other     Other reaction(s): doesnt work, Other (See Comments), Unknown   NON THERAPUTIC    NON THERAPUTIC    Icosapent Ethyl Unknown    Levofloxacin Unknown    Metronidazole Unknown    Ondansetron Hcl Unknown    Penicillins Unknown    Erythromycin Base Rash    Iodine Rash and Unknown     Other reaction(s): Other (See Comments), Unknown   Chemical peritonitis    Chemical peritonitis    Lisinopril Rash       Objective     There were no vitals taken for this visit.    Historical Pharmacotherapy  Victoza 1.2 mg daily  Novolog SS (hasn't needed to  use)    SECONDARY PREVENTION  - Statin? Yes   - ACE-I/ARB? Yes  - Aspirin? Yes    Pertinent PMH Review:  - PMH of Pancreatitis: No  - PMH of Retinopathy: No  - PMH of Urinary Tract Infections: No  - PMH of MTC: No    Lab Review  Lab Results   Component Value Date    BILITOT 0.4 01/09/2025    CALCIUM 9.5 01/09/2025    CO2 25 01/09/2025     01/09/2025    CREATININE 0.76 01/09/2025    GLUCOSE 131 (H) 01/09/2025    ALKPHOS 59 01/09/2025    K 4.5 01/09/2025    PROT 6.2 (L) 01/09/2025     01/09/2025    AST 17 01/09/2025    ALT 18 01/09/2025    BUN 19 01/09/2025    ANIONGAP 15 01/09/2025    ALBUMIN 4.3 01/09/2025    AMYLASE 32 02/12/2021    LIPASE 43 02/12/2021     Lab Results   Component Value Date    EGFR 90 01/09/2025      Lab Results   Component Value Date    TRIG 68 01/09/2025    CHOL 148 01/09/2025    HDL 56.7 01/09/2025     Lab Results   Component Value Date    HGBA1C 5.9 (H) 01/09/2025    HGBA1C 6.2 (H) 10/10/2024    HGBA1C 6.5 (H) 06/17/2024     The 10-year ASCVD risk score (Esperanza SINGLETARY, et al., 2019) is: 4.2%    Values used to calculate the score:      Age: 60 years      Clincally relevant sex: Female      Is Non- : No      Diabetic: Yes      Tobacco smoker: No      Systolic Blood Pressure: 102 mmHg      Is BP treated: Yes      HDL Cholesterol: 56.7 mg/dL      Total Cholesterol: 148 mg/dL    Drug Interactions:  - None    AFFORDABILITY:  - Ineligible for  PAP    Assessment/Plan   Problem List Items Addressed This Visit       Type 2 diabetes mellitus with obesity (Multi) - Primary    Relevant Medications    tirzepatide (Mounjaro) 15 mg/0.5 mL pen injector    Other Relevant Orders    Referral to Clinical Pharmacy     Patient's diabetes is controlled with A1c of 5.9%.     Blood sugars looking excellent. Weight has plateaued and she would like to try 15 mg of Mounjaro. Will send in dose increase today.    PLAN:  INCREASE Mounjaro to 15 mg weekly  Follow up with clinical pharmacist:  9/10/25 @ 10:20   Follow up with PCP: 8/13/25     Thank you,  Coleen Zazueta, PharmD  Clinical Pharmacy Specialist - Primary Care  [phone]  [email or web address]      Continue all meds under the continuation of care with the referring provider and clinical pharmacy team.

## 2025-07-16 ENCOUNTER — TELEMEDICINE (OUTPATIENT)
Dept: PHARMACY | Facility: HOSPITAL | Age: 60
End: 2025-07-16
Payer: COMMERCIAL

## 2025-07-16 ENCOUNTER — APPOINTMENT (OUTPATIENT)
Dept: PRIMARY CARE | Facility: CLINIC | Age: 60
End: 2025-07-16
Payer: COMMERCIAL

## 2025-07-16 DIAGNOSIS — E66.9 TYPE 2 DIABETES MELLITUS WITH OBESITY (MULTI): Primary | ICD-10-CM

## 2025-07-16 DIAGNOSIS — E11.69 TYPE 2 DIABETES MELLITUS WITH OBESITY (MULTI): Primary | ICD-10-CM

## 2025-07-16 PROCEDURE — RXMED WILLOW AMBULATORY MEDICATION CHARGE

## 2025-07-16 RX ORDER — TIRZEPATIDE 15 MG/.5ML
15 INJECTION, SOLUTION SUBCUTANEOUS
Qty: 2 ML | Refills: 5 | Status: SHIPPED | OUTPATIENT
Start: 2025-07-16

## 2025-07-17 DIAGNOSIS — G43.719 INTRACTABLE CHRONIC MIGRAINE WITHOUT AURA AND WITHOUT STATUS MIGRAINOSUS: ICD-10-CM

## 2025-07-17 RX ORDER — HYDROCODONE BITARTRATE AND ACETAMINOPHEN 5; 325 MG/1; MG/1
1 TABLET ORAL 2 TIMES DAILY PRN
Qty: 20 TABLET | Refills: 0 | Status: SHIPPED | OUTPATIENT
Start: 2025-07-17

## 2025-07-17 NOTE — TELEPHONE ENCOUNTER
Med refill    HYDROcodone-acetaminophen (Norco) 5-325 mg tablet [[id number]]     Pharmacy    [id number] - [place], [place]

## 2025-07-19 ENCOUNTER — PHARMACY VISIT (OUTPATIENT)
Dept: PHARMACY | Facility: CLINIC | Age: 60
End: 2025-07-19
Payer: MEDICARE

## 2025-07-25 ENCOUNTER — TELEPHONE (OUTPATIENT)
Dept: PRIMARY CARE | Facility: CLINIC | Age: 60
End: 2025-07-25
Payer: COMMERCIAL

## 2025-07-25 DIAGNOSIS — I10 ESSENTIAL HYPERTENSION: ICD-10-CM

## 2025-07-25 DIAGNOSIS — E03.9 ACQUIRED HYPOTHYROIDISM: ICD-10-CM

## 2025-07-25 RX ORDER — LEVOTHYROXINE SODIUM 88 UG/1
88 TABLET ORAL DAILY
Qty: 90 TABLET | Refills: 1 | Status: SHIPPED | OUTPATIENT
Start: 2025-07-25

## 2025-07-25 RX ORDER — LOSARTAN POTASSIUM 100 MG/1
100 TABLET ORAL DAILY
Qty: 90 TABLET | Refills: 1 | Status: SHIPPED | OUTPATIENT
Start: 2025-07-25

## 2025-07-25 NOTE — TELEPHONE ENCOUNTER
Patient called for refills for maintenance medications Levothyroxine and Losartan is out of refills. Advised patient to contact her pharmacy ask for a Sure Script to be sent to provider requesting script renewal with refills.

## 2025-08-13 ENCOUNTER — APPOINTMENT (OUTPATIENT)
Dept: PRIMARY CARE | Facility: CLINIC | Age: 60
End: 2025-08-13
Payer: COMMERCIAL

## 2025-08-13 VITALS
WEIGHT: 180.4 LBS | OXYGEN SATURATION: 98 % | DIASTOLIC BLOOD PRESSURE: 82 MMHG | HEIGHT: 68 IN | BODY MASS INDEX: 27.34 KG/M2 | RESPIRATION RATE: 20 BRPM | SYSTOLIC BLOOD PRESSURE: 122 MMHG | TEMPERATURE: 96.4 F | HEART RATE: 68 BPM

## 2025-08-13 DIAGNOSIS — J47.1 BRONCHIECTASIS WITH ACUTE EXACERBATION (MULTI): ICD-10-CM

## 2025-08-13 DIAGNOSIS — E11.69 TYPE 2 DIABETES MELLITUS WITH OBESITY (MULTI): ICD-10-CM

## 2025-08-13 DIAGNOSIS — G43.719 INTRACTABLE CHRONIC MIGRAINE WITHOUT AURA AND WITHOUT STATUS MIGRAINOSUS: ICD-10-CM

## 2025-08-13 DIAGNOSIS — E55.9 VITAMIN D INSUFFICIENCY: Primary | ICD-10-CM

## 2025-08-13 DIAGNOSIS — G43.809 HEADACHE, VARIANT MIGRAINE: ICD-10-CM

## 2025-08-13 DIAGNOSIS — E78.00 HYPERCHOLESTEREMIA: ICD-10-CM

## 2025-08-13 DIAGNOSIS — E66.9 TYPE 2 DIABETES MELLITUS WITH OBESITY (MULTI): ICD-10-CM

## 2025-08-13 DIAGNOSIS — E03.9 ACQUIRED HYPOTHYROIDISM: ICD-10-CM

## 2025-08-13 DIAGNOSIS — R35.0 FREQUENT URINATION: ICD-10-CM

## 2025-08-13 PROCEDURE — 4010F ACE/ARB THERAPY RXD/TAKEN: CPT | Performed by: INTERNAL MEDICINE

## 2025-08-13 PROCEDURE — 3008F BODY MASS INDEX DOCD: CPT | Performed by: INTERNAL MEDICINE

## 2025-08-13 PROCEDURE — 3079F DIAST BP 80-89 MM HG: CPT | Performed by: INTERNAL MEDICINE

## 2025-08-13 PROCEDURE — 3074F SYST BP LT 130 MM HG: CPT | Performed by: INTERNAL MEDICINE

## 2025-08-13 PROCEDURE — 1036F TOBACCO NON-USER: CPT | Performed by: INTERNAL MEDICINE

## 2025-08-13 PROCEDURE — 99214 OFFICE O/P EST MOD 30 MIN: CPT | Performed by: INTERNAL MEDICINE

## 2025-08-13 RX ORDER — HYDROCODONE BITARTRATE AND ACETAMINOPHEN 5; 325 MG/1; MG/1
1 TABLET ORAL 2 TIMES DAILY PRN
Qty: 20 TABLET | Refills: 0 | Status: SHIPPED | OUTPATIENT
Start: 2025-08-13

## 2025-08-13 RX ORDER — OXYBUTYNIN CHLORIDE 10 MG/1
10 TABLET, EXTENDED RELEASE ORAL DAILY
Qty: 90 TABLET | Refills: 2 | Status: SHIPPED | OUTPATIENT
Start: 2025-08-13 | End: 2026-08-13

## 2025-08-13 RX ORDER — OXYBUTYNIN CHLORIDE 5 MG/1
5 TABLET ORAL 2 TIMES DAILY
Qty: 60 TABLET | Refills: 5 | Status: CANCELLED | OUTPATIENT
Start: 2025-08-13 | End: 2026-02-09

## 2025-08-13 SDOH — ECONOMIC STABILITY: FOOD INSECURITY: WITHIN THE PAST 12 MONTHS, YOU WORRIED THAT YOUR FOOD WOULD RUN OUT BEFORE YOU GOT MONEY TO BUY MORE.: NEVER TRUE

## 2025-08-13 SDOH — ECONOMIC STABILITY: FOOD INSECURITY: WITHIN THE PAST 12 MONTHS, THE FOOD YOU BOUGHT JUST DIDN'T LAST AND YOU DIDN'T HAVE MONEY TO GET MORE.: NEVER TRUE

## 2025-08-13 ASSESSMENT — LIFESTYLE VARIABLES
HOW OFTEN DO YOU HAVE A DRINK CONTAINING ALCOHOL: NEVER
HOW MANY STANDARD DRINKS CONTAINING ALCOHOL DO YOU HAVE ON A TYPICAL DAY: PATIENT DOES NOT DRINK
SKIP TO QUESTIONS 9-10: 1
HOW OFTEN DO YOU HAVE SIX OR MORE DRINKS ON ONE OCCASION: NEVER
AUDIT-C TOTAL SCORE: 0

## 2025-08-13 ASSESSMENT — ENCOUNTER SYMPTOMS
LOSS OF SENSATION IN FEET: 0
OCCASIONAL FEELINGS OF UNSTEADINESS: 0
DEPRESSION: 0

## 2025-08-13 ASSESSMENT — PAIN SCALES - GENERAL: PAINLEVEL_OUTOF10: 0-NO PAIN

## 2025-08-14 PROCEDURE — RXMED WILLOW AMBULATORY MEDICATION CHARGE

## 2025-08-16 ENCOUNTER — PHARMACY VISIT (OUTPATIENT)
Dept: PHARMACY | Facility: CLINIC | Age: 60
End: 2025-08-16
Payer: MEDICARE

## 2025-09-10 ENCOUNTER — APPOINTMENT (OUTPATIENT)
Dept: PHARMACY | Facility: HOSPITAL | Age: 60
End: 2025-09-10
Payer: COMMERCIAL

## 2025-10-06 ENCOUNTER — APPOINTMENT (OUTPATIENT)
Dept: PRIMARY CARE | Facility: CLINIC | Age: 60
End: 2025-10-06
Payer: COMMERCIAL

## 2025-11-03 ENCOUNTER — APPOINTMENT (OUTPATIENT)
Dept: PRIMARY CARE | Facility: CLINIC | Age: 60
End: 2025-11-03
Payer: COMMERCIAL

## 2026-02-11 ENCOUNTER — APPOINTMENT (OUTPATIENT)
Dept: PRIMARY CARE | Facility: CLINIC | Age: 61
End: 2026-02-11
Payer: COMMERCIAL